# Patient Record
Sex: FEMALE | Race: WHITE | Employment: FULL TIME | URBAN - METROPOLITAN AREA
[De-identification: names, ages, dates, MRNs, and addresses within clinical notes are randomized per-mention and may not be internally consistent; named-entity substitution may affect disease eponyms.]

---

## 2017-01-11 PROCEDURE — 82950 GLUCOSE TEST: CPT | Performed by: OBSTETRICS & GYNECOLOGY

## 2017-01-12 PROCEDURE — 82952 GTT-ADDED SAMPLES: CPT | Performed by: OBSTETRICS & GYNECOLOGY

## 2017-01-12 PROCEDURE — 36415 COLL VENOUS BLD VENIPUNCTURE: CPT | Performed by: OBSTETRICS & GYNECOLOGY

## 2017-01-12 PROCEDURE — 82951 GLUCOSE TOLERANCE TEST (GTT): CPT | Performed by: OBSTETRICS & GYNECOLOGY

## 2017-02-24 ENCOUNTER — HOSPITAL ENCOUNTER (OUTPATIENT)
Facility: HOSPITAL | Age: 37
Setting detail: OBSERVATION
Discharge: HOME | End: 2017-02-25
Attending: OBSTETRICS & GYNECOLOGY | Admitting: OBSTETRICS & GYNECOLOGY
Payer: COMMERCIAL

## 2017-02-24 PROBLEM — Z34.90 PREGNANCY: Status: ACTIVE | Noted: 2017-02-24

## 2017-02-24 PROBLEM — Z34.90 PREGNANCY (HCC): Status: ACTIVE | Noted: 2017-02-24

## 2017-02-24 LAB
BILIRUB UR QL STRIP.AUTO: NEGATIVE
BILIRUBIN URINE: NEGATIVE
BLOOD URINE: NEGATIVE
CLARITY UR REFRACT.AUTO: CLEAR
COLOR UR AUTO: COLORLESS
CONTROL RUN WITHIN 24 HOURS?: YES
GLUCOSE UR STRIP.AUTO-MCNC: NEGATIVE MG/DL
GLUCOSE URINE: NEGATIVE
KETONE URINE: NEGATIVE
KETONES UR STRIP.AUTO-MCNC: NEGATIVE MG/DL
LEUKOCYTE ESTERASE UR QL STRIP.AUTO: NEGATIVE
LEUKOCYTE ESTERASE URINE: NEGATIVE
NITRITE UR QL STRIP.AUTO: NEGATIVE
NITRITE URINE: NEGATIVE
PH UR STRIP.AUTO: 7 [PH] (ref 4.5–8)
PH URINE: 5.5 (ref 5–8)
PROT UR STRIP.AUTO-MCNC: NEGATIVE MG/DL
PROTEIN URINE: NEGATIVE
RBC UR QL AUTO: NEGATIVE
SP GR UR STRIP.AUTO: <1.005 (ref 1–1.03)
SPEC GRAVITY: <=1.005 (ref 1–1.03)
URINE CLARITY: CLEAR
URINE COLOR: YELLOW
UROBILINOGEN UR STRIP.AUTO-MCNC: <2 MG/DL
UROBILINOGEN URINE: 0.2

## 2017-02-24 PROCEDURE — 96361 HYDRATE IV INFUSION ADD-ON: CPT

## 2017-02-24 PROCEDURE — 87086 URINE CULTURE/COLONY COUNT: CPT | Performed by: OBSTETRICS & GYNECOLOGY

## 2017-02-24 PROCEDURE — 81003 URINALYSIS AUTO W/O SCOPE: CPT | Performed by: OBSTETRICS & GYNECOLOGY

## 2017-02-24 PROCEDURE — 76816 OB US FOLLOW-UP PER FETUS: CPT | Performed by: OBSTETRICS & GYNECOLOGY

## 2017-02-24 PROCEDURE — 96360 HYDRATION IV INFUSION INIT: CPT

## 2017-02-24 PROCEDURE — 96372 THER/PROPH/DIAG INJ SC/IM: CPT

## 2017-02-24 PROCEDURE — 81002 URINALYSIS NONAUTO W/O SCOPE: CPT

## 2017-02-24 RX ORDER — SODIUM CHLORIDE, SODIUM LACTATE, POTASSIUM CHLORIDE, CALCIUM CHLORIDE 600; 310; 30; 20 MG/100ML; MG/100ML; MG/100ML; MG/100ML
INJECTION, SOLUTION INTRAVENOUS CONTINUOUS
Status: DISCONTINUED | OUTPATIENT
Start: 2017-02-24 | End: 2017-02-25

## 2017-02-24 RX ORDER — CHOLECALCIFEROL (VITAMIN D3) 25 MCG
1 TABLET,CHEWABLE ORAL DAILY
Status: DISCONTINUED | OUTPATIENT
Start: 2017-02-24 | End: 2017-02-25

## 2017-02-24 RX ORDER — DOCUSATE SODIUM 100 MG/1
100 CAPSULE, LIQUID FILLED ORAL 2 TIMES DAILY
Status: DISCONTINUED | OUTPATIENT
Start: 2017-02-24 | End: 2017-02-25

## 2017-02-24 RX ORDER — SODIUM CHLORIDE, SODIUM LACTATE, POTASSIUM CHLORIDE, CALCIUM CHLORIDE 600; 310; 30; 20 MG/100ML; MG/100ML; MG/100ML; MG/100ML
INJECTION, SOLUTION INTRAVENOUS CONTINUOUS
Status: DISCONTINUED | OUTPATIENT
Start: 2017-02-24 | End: 2017-02-24

## 2017-02-24 RX ORDER — BETAMETHASONE SODIUM PHOSPHATE AND BETAMETHASONE ACETATE 3; 3 MG/ML; MG/ML
12 INJECTION, SUSPENSION INTRA-ARTICULAR; INTRALESIONAL; INTRAMUSCULAR; SOFT TISSUE EVERY 24 HOURS
Status: DISCONTINUED | OUTPATIENT
Start: 2017-02-24 | End: 2017-02-25

## 2017-02-24 RX ORDER — BETAMETHASONE SODIUM PHOSPHATE AND BETAMETHASONE ACETATE 3; 3 MG/ML; MG/ML
INJECTION, SUSPENSION INTRA-ARTICULAR; INTRALESIONAL; INTRAMUSCULAR; SOFT TISSUE
Status: COMPLETED
Start: 2017-02-24 | End: 2017-02-24

## 2017-02-24 RX ORDER — INDOMETHACIN 50 MG/1
50 CAPSULE ORAL ONCE
Status: COMPLETED | OUTPATIENT
Start: 2017-02-24 | End: 2017-02-24

## 2017-02-24 RX ORDER — INDOMETHACIN 50 MG/1
CAPSULE ORAL
Status: COMPLETED
Start: 2017-02-24 | End: 2017-02-24

## 2017-02-24 RX ORDER — METOPROLOL SUCCINATE 50 MG/1
50 TABLET, EXTENDED RELEASE ORAL
Status: DISCONTINUED | OUTPATIENT
Start: 2017-02-24 | End: 2017-02-25

## 2017-02-24 RX ORDER — INDOMETHACIN 25 MG/1
25 CAPSULE ORAL EVERY 6 HOURS
Status: DISCONTINUED | OUTPATIENT
Start: 2017-02-24 | End: 2017-02-25

## 2017-02-24 NOTE — H&P
[address] [place] Patient Status:  Observation    1980 MRN [id number]   [facility] 1NW-A Attending Prasanth Roque MD   Hosp Day # 0 PCP Farheen Frias MD     Date of Admission:   Thyroid Disorder Mother    • Pulmonary Fibrosis[other] [OTHER] Paternal Grandfather    • Lung Disorder Paternal Grandfather    • Rectal Prolapse[other] [OTHER] Sister    • Arthritis Paternal Grandmother      Social History:    Smoking status: Former Smoker become stronger  4) Low lying placenta:  -was 2cm away on last scan  -no further bleeding  5) General diet, IV fluids, bathroom privileges  Risks, benefits, alternatives and possible complications have been discussed in detail with the patient.   Pre-admiss

## 2017-02-24 NOTE — CONSULTS
[address] [place] Patient Status:  Observation    1980 MRN [id number]   [facility] 1NW-A Attending Lachelle Badillo MD   Hosp Day # 0 PCP Pérez Arana MD     SUBJECTIVE:  Reason for Admi Quit date: 04/27/2013    Smokeless tobacco: Never Used    Comment: 4/27/13    Alcohol Use: No        Review of Systems:  Unremarkable.     OBJECTIVE:  Temp:  [97.4 °F (36.3 °C)] 97.4 °F (36.3 °C)  Pulse:  [66] 66  Resp:  [18] 18  BP: (128)/(61) 128/61 mmHg

## 2017-02-24 NOTE — PROGRESS NOTES
Pt is a 31year old female admitted to [facility]. Patient presents with:  R/o  Labor     Pt is  31w0d intra-uterine pregnancy. History obtained, consents signed. Oriented to room, staff, and plan of care.  Patient reports abdominal tighte

## 2017-02-24 NOTE — IMAGING NOTE
Indication: PTL.   ____________________________________________________________________________  History: Age: 31 years.  : 2 Para: 1.  ____________________________________________________________________________  Dating:  LMP: 2016 EDC: / abnormalities. The limitations of ultrasound were discussed. IMPRESSION:    1.  IUP at 31 0/7 wks    2. Scan consistent with dates     3. No ultrasound evidence of structural abnormalities are seen.

## 2017-02-25 ENCOUNTER — HOSPITAL ENCOUNTER (OUTPATIENT)
Facility: HOSPITAL | Age: 37
Setting detail: OBSERVATION
End: 2017-02-25
Attending: OBSTETRICS & GYNECOLOGY | Admitting: OBSTETRICS & GYNECOLOGY
Payer: COMMERCIAL

## 2017-02-25 VITALS
HEIGHT: 68 IN | SYSTOLIC BLOOD PRESSURE: 126 MMHG | BODY MASS INDEX: 33.8 KG/M2 | WEIGHT: 223 LBS | TEMPERATURE: 99 F | HEART RATE: 76 BPM | DIASTOLIC BLOOD PRESSURE: 63 MMHG | RESPIRATION RATE: 18 BRPM

## 2017-02-25 PROCEDURE — 96372 THER/PROPH/DIAG INJ SC/IM: CPT

## 2017-02-25 RX ORDER — NIFEDIPINE 20 MG/1
20 CAPSULE ORAL EVERY 8 HOURS
Status: DISCONTINUED | OUTPATIENT
Start: 2017-02-25 | End: 2017-02-25

## 2017-02-25 RX ORDER — NIFEDIPINE 20 MG/1
20 CAPSULE ORAL EVERY 8 HOURS
Qty: 42 CAPSULE | Refills: 0 | Status: SHIPPED | OUTPATIENT
Start: 2017-02-25 | End: 2017-03-01

## 2017-02-25 NOTE — PROGRESS NOTES
Discharged to home per ambulatory in stable condition with written and verbal instructions. Patient verbalizes understanding of information given.

## 2017-02-25 NOTE — PROGRESS NOTES
She felt mild ctx this morning--like yesterday--but improved now. NO bleeding no LOF, good FM.     O:   02/25/17  0515   BP: 126/63   Pulse: 76   Temp: 98.5 °F (36.9 °C)   Resp: 18   FHT: 140 mod adalberto, +accels, no decels  [place]: q3-5 min this morning, now q5-

## 2017-03-02 PROCEDURE — 82731 ASSAY OF FETAL FIBRONECTIN: CPT | Performed by: OBSTETRICS & GYNECOLOGY

## 2017-03-03 PROCEDURE — 82731 ASSAY OF FETAL FIBRONECTIN: CPT | Performed by: OBSTETRICS & GYNECOLOGY

## 2017-03-03 PROCEDURE — 36415 COLL VENOUS BLD VENIPUNCTURE: CPT | Performed by: OBSTETRICS & GYNECOLOGY

## 2017-03-24 PROCEDURE — 87081 CULTURE SCREEN ONLY: CPT | Performed by: OBSTETRICS & GYNECOLOGY

## 2017-04-03 ENCOUNTER — OFFICE VISIT (OUTPATIENT)
Dept: PERINATAL CARE | Facility: HOSPITAL | Age: 37
End: 2017-04-03
Attending: OBSTETRICS & GYNECOLOGY
Payer: COMMERCIAL

## 2017-04-03 VITALS — SYSTOLIC BLOOD PRESSURE: 112 MMHG | DIASTOLIC BLOOD PRESSURE: 60 MMHG | HEART RATE: 87 BPM

## 2017-04-03 DIAGNOSIS — O40.3XX0 POLYHYDRAMNIOS, THIRD TRIMESTER, NOT APPLICABLE OR UNSPECIFIED FETUS: Primary | ICD-10-CM

## 2017-04-03 PROCEDURE — 76819 FETAL BIOPHYS PROFIL W/O NST: CPT

## 2017-04-03 NOTE — PROGRESS NOTES
Outpatient Maternal-Fetal Medicine Consultation    Dear Dr. Minda Sood    Thank you for requesting ultrasound evaluation and maternal fetal medicine consultation on your patient Trina Martin.   As you are aware she is a 31year old female  with a 120 capsule, Rfl: 0  Allergies: No Known Allergies    PHYSICAL EXAMINATION:  /60 mmHg  Pulse 87  LMP 07/14/2016  General: alert and oriented,no acute distress  Abdomen: gravid, soft, non-tender  Extremities: non-tender, no edema    OBSTETRIC ULTRASOU cm. Q1: 7.7 cm. Q2: 6.3 cm. Q3: 4.1 cm. Q4: 12.0 cm. Non Stress Test: Fetal heart rate: 137 bpm.   Biophysical Profile: Fetal body movements: normal (2), Fetal tone: normal (2), Fetal breathing movements: normal (2), Amniotic fluid volume: normal (2).  Sc 36w3d  · Polyhydramnios - likely idiopathic  · Unstable fetal lie: Cephalic at initiation of ultrasound and back up transverse at conclusion of ultrasound    RECOMMENDATIONS:  · Continue care with Dr. Niko Jurado  · Continue weekly NST's  · Reassess fetal posit

## 2017-04-09 ENCOUNTER — TELEPHONE (OUTPATIENT)
Dept: OBGYN UNIT | Facility: HOSPITAL | Age: 37
End: 2017-04-09

## 2017-04-10 ENCOUNTER — TELEPHONE (OUTPATIENT)
Dept: OBGYN UNIT | Facility: HOSPITAL | Age: 37
End: 2017-04-10

## 2017-04-12 ENCOUNTER — ANESTHESIA (OUTPATIENT)
Dept: OBGYN UNIT | Facility: HOSPITAL | Age: 37
End: 2017-04-12
Payer: COMMERCIAL

## 2017-04-12 ENCOUNTER — APPOINTMENT (OUTPATIENT)
Dept: OBGYN CLINIC | Facility: HOSPITAL | Age: 37
End: 2017-04-12
Attending: OBSTETRICS & GYNECOLOGY
Payer: COMMERCIAL

## 2017-04-12 ENCOUNTER — APPOINTMENT (OUTPATIENT)
Dept: GENERAL RADIOLOGY | Facility: HOSPITAL | Age: 37
End: 2017-04-12
Attending: OBSTETRICS & GYNECOLOGY
Payer: COMMERCIAL

## 2017-04-12 ENCOUNTER — SURGERY (OUTPATIENT)
Age: 37
End: 2017-04-12

## 2017-04-12 ENCOUNTER — HOSPITAL ENCOUNTER (INPATIENT)
Facility: HOSPITAL | Age: 37
LOS: 3 days | Discharge: HOME | End: 2017-04-15
Attending: OBSTETRICS & GYNECOLOGY | Admitting: OBSTETRICS & GYNECOLOGY
Payer: COMMERCIAL

## 2017-04-12 ENCOUNTER — ANESTHESIA EVENT (OUTPATIENT)
Dept: OBGYN UNIT | Facility: HOSPITAL | Age: 37
End: 2017-04-12
Payer: COMMERCIAL

## 2017-04-12 PROBLEM — Z34.90 PREGNANT: Status: ACTIVE | Noted: 2017-04-12

## 2017-04-12 PROBLEM — Z34.90 PREGNANT (HCC): Status: ACTIVE | Noted: 2017-04-12

## 2017-04-12 PROCEDURE — 74000 XR ABDOMEN (1 VIEW) (CPT=74000): CPT

## 2017-04-12 PROCEDURE — 59514 CESAREAN DELIVERY ONLY: CPT | Performed by: OBSTETRICS & GYNECOLOGY

## 2017-04-12 RX ORDER — TERBUTALINE SULFATE 1 MG/ML
0.25 INJECTION, SOLUTION SUBCUTANEOUS AS NEEDED
Status: DISCONTINUED | OUTPATIENT
Start: 2017-04-12 | End: 2017-04-12 | Stop reason: HOSPADM

## 2017-04-12 RX ORDER — DIPHENHYDRAMINE HYDROCHLORIDE 50 MG/ML
12.5 INJECTION INTRAMUSCULAR; INTRAVENOUS EVERY 4 HOURS PRN
Status: DISCONTINUED | OUTPATIENT
Start: 2017-04-12 | End: 2017-04-15

## 2017-04-12 RX ORDER — KETOROLAC TROMETHAMINE 30 MG/ML
30 INJECTION, SOLUTION INTRAMUSCULAR; INTRAVENOUS ONCE AS NEEDED
Status: DISCONTINUED | OUTPATIENT
Start: 2017-04-12 | End: 2017-04-12 | Stop reason: HOSPADM

## 2017-04-12 RX ORDER — KETOROLAC TROMETHAMINE 30 MG/ML
30 INJECTION, SOLUTION INTRAMUSCULAR; INTRAVENOUS EVERY 6 HOURS PRN
Status: DISPENSED | OUTPATIENT
Start: 2017-04-12 | End: 2017-04-14

## 2017-04-12 RX ORDER — DOCUSATE SODIUM 100 MG/1
100 CAPSULE, LIQUID FILLED ORAL
Status: DISCONTINUED | OUTPATIENT
Start: 2017-04-13 | End: 2017-04-15

## 2017-04-12 RX ORDER — ONDANSETRON 2 MG/ML
4 INJECTION INTRAMUSCULAR; INTRAVENOUS EVERY 6 HOURS PRN
Status: DISCONTINUED | OUTPATIENT
Start: 2017-04-12 | End: 2017-04-15

## 2017-04-12 RX ORDER — IBUPROFEN 600 MG/1
600 TABLET ORAL ONCE AS NEEDED
Status: DISCONTINUED | OUTPATIENT
Start: 2017-04-12 | End: 2017-04-12 | Stop reason: HOSPADM

## 2017-04-12 RX ORDER — ZOLPIDEM TARTRATE 5 MG/1
5 TABLET ORAL NIGHTLY PRN
Status: DISCONTINUED | OUTPATIENT
Start: 2017-04-12 | End: 2017-04-15

## 2017-04-12 RX ORDER — METOCLOPRAMIDE HYDROCHLORIDE 5 MG/ML
10 INJECTION INTRAMUSCULAR; INTRAVENOUS EVERY 4 HOURS PRN
Status: DISCONTINUED | OUTPATIENT
Start: 2017-04-12 | End: 2017-04-15

## 2017-04-12 RX ORDER — IBUPROFEN 600 MG/1
600 TABLET ORAL EVERY 6 HOURS SCHEDULED
Status: DISCONTINUED | OUTPATIENT
Start: 2017-04-13 | End: 2017-04-15

## 2017-04-12 RX ORDER — DEXTROSE, SODIUM CHLORIDE, SODIUM LACTATE, POTASSIUM CHLORIDE, AND CALCIUM CHLORIDE 5; .6; .31; .03; .02 G/100ML; G/100ML; G/100ML; G/100ML; G/100ML
INJECTION, SOLUTION INTRAVENOUS CONTINUOUS
Status: DISCONTINUED | OUTPATIENT
Start: 2017-04-12 | End: 2017-04-15

## 2017-04-12 RX ORDER — DIPHENHYDRAMINE HCL 25 MG
25 CAPSULE ORAL EVERY 6 HOURS PRN
Status: DISCONTINUED | OUTPATIENT
Start: 2017-04-12 | End: 2017-04-15

## 2017-04-12 RX ORDER — HYDROMORPHONE HYDROCHLORIDE 1 MG/ML
0.4 INJECTION, SOLUTION INTRAMUSCULAR; INTRAVENOUS; SUBCUTANEOUS EVERY 5 MIN PRN
Status: DISCONTINUED | OUTPATIENT
Start: 2017-04-12 | End: 2017-04-12 | Stop reason: HOSPADM

## 2017-04-12 RX ORDER — ACETAMINOPHEN 10 MG/ML
1000 INJECTION, SOLUTION INTRAVENOUS EVERY 6 HOURS PRN
Status: DISCONTINUED | OUTPATIENT
Start: 2017-04-12 | End: 2017-04-13

## 2017-04-12 RX ORDER — HYDROMORPHONE HYDROCHLORIDE 1 MG/ML
INJECTION, SOLUTION INTRAMUSCULAR; INTRAVENOUS; SUBCUTANEOUS
Status: DISPENSED
Start: 2017-04-12 | End: 2017-04-12

## 2017-04-12 RX ORDER — HYDROMORPHONE HYDROCHLORIDE 1 MG/ML
0.5 INJECTION, SOLUTION INTRAMUSCULAR; INTRAVENOUS; SUBCUTANEOUS ONCE
Status: COMPLETED | OUTPATIENT
Start: 2017-04-12 | End: 2017-04-12

## 2017-04-12 RX ORDER — DIPHENHYDRAMINE HYDROCHLORIDE 50 MG/ML
25 INJECTION INTRAMUSCULAR; INTRAVENOUS ONCE AS NEEDED
Status: DISCONTINUED | OUTPATIENT
Start: 2017-04-12 | End: 2017-04-12 | Stop reason: HOSPADM

## 2017-04-12 RX ORDER — HYDROCODONE BITARTRATE AND ACETAMINOPHEN 5; 325 MG/1; MG/1
1 TABLET ORAL EVERY 4 HOURS PRN
Status: DISCONTINUED | OUTPATIENT
Start: 2017-04-13 | End: 2017-04-13

## 2017-04-12 RX ORDER — HYDROMORPHONE HYDROCHLORIDE 1 MG/ML
INJECTION, SOLUTION INTRAMUSCULAR; INTRAVENOUS; SUBCUTANEOUS
Status: COMPLETED
Start: 2017-04-12 | End: 2017-04-12

## 2017-04-12 RX ORDER — BISACODYL 10 MG
10 SUPPOSITORY, RECTAL RECTAL
Status: DISCONTINUED | OUTPATIENT
Start: 2017-04-12 | End: 2017-04-15

## 2017-04-12 RX ORDER — SIMETHICONE 80 MG
80 TABLET,CHEWABLE ORAL 3 TIMES DAILY PRN
Status: DISCONTINUED | OUTPATIENT
Start: 2017-04-12 | End: 2017-04-15

## 2017-04-12 RX ORDER — NALBUPHINE HCL 10 MG/ML
10 AMPUL (ML) INJECTION
Status: DISCONTINUED | OUTPATIENT
Start: 2017-04-12 | End: 2017-04-15

## 2017-04-12 RX ORDER — DEXTROSE, SODIUM CHLORIDE, SODIUM LACTATE, POTASSIUM CHLORIDE, AND CALCIUM CHLORIDE 5; .6; .31; .03; .02 G/100ML; G/100ML; G/100ML; G/100ML; G/100ML
INJECTION, SOLUTION INTRAVENOUS AS NEEDED
Status: DISCONTINUED | OUTPATIENT
Start: 2017-04-12 | End: 2017-04-12 | Stop reason: HOSPADM

## 2017-04-12 RX ORDER — METOPROLOL SUCCINATE 50 MG/1
50 TABLET, EXTENDED RELEASE ORAL
Status: DISCONTINUED | OUTPATIENT
Start: 2017-04-13 | End: 2017-04-15

## 2017-04-12 RX ORDER — SODIUM CHLORIDE, SODIUM LACTATE, POTASSIUM CHLORIDE, CALCIUM CHLORIDE 600; 310; 30; 20 MG/100ML; MG/100ML; MG/100ML; MG/100ML
INJECTION, SOLUTION INTRAVENOUS CONTINUOUS
Status: DISCONTINUED | OUTPATIENT
Start: 2017-04-12 | End: 2017-04-12

## 2017-04-12 RX ORDER — NALBUPHINE HCL 10 MG/ML
2.5 AMPUL (ML) INJECTION
Status: DISCONTINUED | OUTPATIENT
Start: 2017-04-12 | End: 2017-04-12 | Stop reason: HOSPADM

## 2017-04-12 RX ORDER — SODIUM CHLORIDE, SODIUM LACTATE, POTASSIUM CHLORIDE, CALCIUM CHLORIDE 600; 310; 30; 20 MG/100ML; MG/100ML; MG/100ML; MG/100ML
INJECTION, SOLUTION INTRAVENOUS CONTINUOUS
Status: DISCONTINUED | OUTPATIENT
Start: 2017-04-12 | End: 2017-04-12 | Stop reason: HOSPADM

## 2017-04-12 RX ORDER — METHYLERGONOVINE MALEATE 0.2 MG/ML
INJECTION INTRAVENOUS
Status: DISPENSED
Start: 2017-04-12 | End: 2017-04-12

## 2017-04-12 RX ORDER — KETOROLAC TROMETHAMINE 30 MG/ML
INJECTION, SOLUTION INTRAMUSCULAR; INTRAVENOUS
Status: DISPENSED
Start: 2017-04-12 | End: 2017-04-12

## 2017-04-12 RX ORDER — ONDANSETRON 2 MG/ML
4 INJECTION INTRAMUSCULAR; INTRAVENOUS ONCE AS NEEDED
Status: DISCONTINUED | OUTPATIENT
Start: 2017-04-12 | End: 2017-04-12 | Stop reason: HOSPADM

## 2017-04-12 RX ORDER — HYDROCODONE BITARTRATE AND ACETAMINOPHEN 10; 325 MG/1; MG/1
1 TABLET ORAL EVERY 4 HOURS PRN
Status: DISCONTINUED | OUTPATIENT
Start: 2017-04-13 | End: 2017-04-13

## 2017-04-12 NOTE — H&P
Pt is 30 y/o  who presents at 37 weeks 5 days for induction for unstable lie and polyhydramnios.     PMH - 1st baby - 15 hour labor - pushed 3 hours - vacuum - straight OP               No surgeries               No medical issues               No H/

## 2017-04-12 NOTE — ANESTHESIA PREPROCEDURE EVALUATION
PRE-OP EVALUATION    Patient Name: Alycia Martinez [place]    Pre-op Diagnosis: * No pre-op diagnosis entered *    Procedure(s):      Surgeon(s) and Role:     Jennifer Guo MD - Primary     * Familia Weston MD - Assisting Surgeon    Pre-op vitals reviewed. Quit date: 04/27/2013    Smokeless tobacco: Never Used    Comment: 4/27/13    Alcohol Use: No       Drug Use: No     Available pre-op labs reviewed.     Lab Results  Component Value Date   WBC 9.5 04/12/2017   RBC 3.53* 04/12/2017   HGB 11.7* 04/12/2017   H

## 2017-04-12 NOTE — BRIEF OP NOTE
[facility] 1NW-A  Brief Op Note     Krista Pin [place] Location: OR   [facility] [id number] MRN [id number]   Admission Date 4/12/2017 Operation Date 4/12/2017   Attending Physician Sandra Bravo MD Operating Physician Cristine Castillo MD       Pre-Operative Everton Che[place]

## 2017-04-12 NOTE — PLAN OF CARE
PAIN - ADULT    • Verbalizes/displays adequate comfort level or patient's stated pain goal Progressing        POSTPARTUM    • Long Term Goal:Experiences normal postpartum course Progressing    • Optimize infant feeding at the breast Progressing    • Approp

## 2017-04-12 NOTE — PROGRESS NOTES
Admitted to mother/baby per cart. Oriented to room. Safety precautions initiated. Bed in low position. Call light within reach. IV infusing on pump, Oropeza to gravity, SCD's being applied.

## 2017-04-12 NOTE — ANESTHESIA POSTPROCEDURE EVALUATION
[address] [place] Patient Status:  Inpatient   Age/Gender 31year old female MRN [id number]   [facility] 1NW-A Attending Aramis Clark MD   Hosp Day # 0 PCP Flavia Malagon MD       Anesthesia Post-op Note    Procedure(

## 2017-04-12 NOTE — PLAN OF CARE
Problem: Patient/Family Goals  Goal: Patient/Family Long Term Goal  Patient’s Long Term Goal:  Patient will have a uncomplicated vagina delivery  Interventions:    - See additional Care Plan goals for specific interventions   Outcome: Completed Date Met:

## 2017-04-12 NOTE — OPERATIVE REPORT
[facility]    PATIENT'S NAME: Андрей Pereaaustin   ATTENDING PHYSICIAN: Mariama Damon M.D. OPERATING PHYSICIAN: Mariama Damon M.D.    PATIENT ACCOUNT#:   [de-identified]    LOCATION:  1NW-A [facility]  MEDICAL RECORD #:   [id number]       DATE OF BIR down from the uterus. The head was delivered. The nares and mouth were suctioned. There was no obvious cord presenting. The rest of the infant delivered easily. Vigorous live-born female. Delayed cord clamping for 30 seconds.   The cord was clamped, t the procedure well and was transferred to the recovery room in stable condition.     Dictated By Bruno Jorge M.D.  d: 04/12/2017 10:23:08  t: 04/12/2017 10:58:56  [facility] [id number]  [facility]/

## 2017-04-12 NOTE — PROGRESS NOTES
Pt given iv dilaudid and toradol  postop. Unable to control with pca dilaudid and iv boluses. Discussed with pt. duramorph spinal. Pt agreeable. .    1100 Pt turned LLD position with knees bent. Sterile tech. 24 g spinal needle. Local to skin.  Easy une

## 2017-04-13 RX ORDER — HYDROCODONE BITARTRATE AND ACETAMINOPHEN 10; 325 MG/1; MG/1
1 TABLET ORAL EVERY 4 HOURS PRN
Status: DISCONTINUED | OUTPATIENT
Start: 2017-04-13 | End: 2017-04-15

## 2017-04-13 RX ORDER — HYDROCODONE BITARTRATE AND ACETAMINOPHEN 5; 325 MG/1; MG/1
1 TABLET ORAL EVERY 4 HOURS PRN
Status: DISCONTINUED | OUTPATIENT
Start: 2017-04-13 | End: 2017-04-15

## 2017-04-13 NOTE — PLAN OF CARE
GASTROINTESTINAL - ADULT    • Minimal or absence of nausea and vomiting Completed          GASTROINTESTINAL - ADULT    • Maintains or returns to baseline bowel function Progressing        GENITOURINARY - ADULT    • Absence of urinary retention Progressing

## 2017-04-13 NOTE — PROGRESS NOTES
Patient transferred to mother/baby room 2208 per cart in stable condition with baby and personal belongings. Accompanied by  and staff. Report given to mother/baby RN.

## 2017-04-13 NOTE — PROGRESS NOTES
POD #1  Pt without complaints - nausea gone since midnight   /63 mmHg  Pulse 64  Temp(Src) 98.2 °F (36.8 °C) (Oral)  Resp 18  Ht 5' 8\" (1.727 m)  Wt 231 lb (104.781 kg)  BMI 35.13 kg/m2  SpO2 98%  LMP 07/14/2016  Breastfeeding? Yes   U. O - 2500  Sandro

## 2017-04-14 NOTE — PROGRESS NOTES
[facility]  Post-Partum Caesarean Section Progress Note    Krista Pin [place] Patient Status:  Inpatient    1980 MRN [id number]   [facility] 2SW-J Attending Sandra Bravo MD   Hosp Day # 2 PCP Gail Titus MD     SUBJECTIVE Emelia  4/14/2017  8:56 AM

## 2017-04-14 NOTE — PLAN OF CARE
GASTROINTESTINAL - ADULT    • Maintains or returns to baseline bowel function Progressing    • Maintains adequate nutritional intake (undernourished) Progressing    • Achieves appropriate nutritional intake (bariatric) Progressing        GENITOURINARY - AD

## 2017-04-15 VITALS
OXYGEN SATURATION: 97 % | TEMPERATURE: 99 F | HEART RATE: 59 BPM | HEIGHT: 68 IN | RESPIRATION RATE: 20 BRPM | DIASTOLIC BLOOD PRESSURE: 68 MMHG | WEIGHT: 231 LBS | SYSTOLIC BLOOD PRESSURE: 117 MMHG | BODY MASS INDEX: 35.01 KG/M2

## 2017-04-15 RX ORDER — HYDROCODONE BITARTRATE AND ACETAMINOPHEN 5; 325 MG/1; MG/1
1 TABLET ORAL EVERY 4 HOURS PRN
Qty: 30 TABLET | Refills: 0 | Status: SHIPPED | OUTPATIENT
Start: 2017-04-15 | End: 2017-05-24

## 2017-04-15 NOTE — DISCHARGE SUMMARY
[facility]  OB Discharge Summary    Tanner Francois [place] Patient Status:  Inpatient    1980 MRN [id number]   [facility] 2SW-J Attending Maricruz Shelton MD   Hosp Day # 3 PCP Errol Allison MD       Primary OB Clinician: Brandt Mason

## 2017-04-15 NOTE — PROGRESS NOTES
[facility]  Post-Partum Caesarean Section Progress Note    Carlotta Monson [place] Patient Status:  Inpatient    1980 MRN [id number]   [facility] 2SW-J Attending Ellen Mobley MD   Hosp Day # 3 PCP Sanjay Gresham MD     SUBJECTIVE

## 2017-04-15 NOTE — PROGRESS NOTES
NURSING DISCHARGE NOTE    Discharged Home via Wheelchair. Accompanied by Support staff  Belongings Taken by patient/family. Discharge instructions reviewed. Pt states understanding.

## 2017-04-17 ENCOUNTER — TELEPHONE (OUTPATIENT)
Dept: OBGYN UNIT | Facility: HOSPITAL | Age: 37
End: 2017-04-17

## 2017-04-22 ENCOUNTER — TELEPHONE (OUTPATIENT)
Dept: OBGYN UNIT | Facility: HOSPITAL | Age: 37
End: 2017-04-22

## 2017-11-07 ENCOUNTER — OFFICE VISIT (OUTPATIENT)
Dept: INTERNAL MEDICINE CLINIC | Facility: CLINIC | Age: 37
End: 2017-11-07

## 2017-11-07 VITALS
HEART RATE: 72 BPM | TEMPERATURE: 98 F | DIASTOLIC BLOOD PRESSURE: 70 MMHG | RESPIRATION RATE: 16 BRPM | SYSTOLIC BLOOD PRESSURE: 128 MMHG | BODY MASS INDEX: 30 KG/M2 | WEIGHT: 199 LBS

## 2017-11-07 DIAGNOSIS — F41.9 ANXIETY: ICD-10-CM

## 2017-11-07 DIAGNOSIS — M54.6 ACUTE LEFT-SIDED THORACIC BACK PAIN: Primary | ICD-10-CM

## 2017-11-07 PROCEDURE — 99203 OFFICE O/P NEW LOW 30 MIN: CPT | Performed by: INTERNAL MEDICINE

## 2017-11-07 RX ORDER — SERTRALINE HYDROCHLORIDE 25 MG/1
25 TABLET, FILM COATED ORAL DAILY
Qty: 90 TABLET | Refills: 1 | Status: SHIPPED | OUTPATIENT
Start: 2017-11-07 | End: 2018-04-28

## 2017-11-07 NOTE — PROGRESS NOTES
Brianda Martin is a 30year old female. Patient presents with:  Back Pain: ROOM 3 used to see SD and now establishing with TB--Back pain started about 3 months ago has not seen anyone nor any imagine.  Middle left side of back is where the pain is Date   • Arrhythmia 2012    chronic pvc   • Depression    • PVCs (premature ventricular contractions)     Chronic;  Pt currently taking Toprol XL daily   • Rotator cuff tear 2002    Left shoulder      Social History:  Smoking status: Former Smoker without respiratory distress, lungs clear to auscultation  CARDIO: RRR nl S1 S2  GI: normal bowel sounds, no masses, HSM or tenderness  Spine: paraspinal muscle tenderness, left side thoracic spine region  EXTREMITIES: no cyanosis, clubbing or edema  NEURO

## 2018-04-30 RX ORDER — SERTRALINE HYDROCHLORIDE 25 MG/1
TABLET, FILM COATED ORAL
Qty: 90 TABLET | Refills: 0 | Status: SHIPPED | OUTPATIENT
Start: 2018-04-30 | End: 2018-07-02

## 2018-04-30 NOTE — TELEPHONE ENCOUNTER
LOV: 11/7/17 w/ TB  FOV: None  Last labs: 4/17/17 CBC  Last Refill: 11/7/17 qt:90 1 refill    Per protocol sent to provider

## 2018-07-30 RX ORDER — SERTRALINE HYDROCHLORIDE 25 MG/1
TABLET, FILM COATED ORAL
Qty: 90 TABLET | Refills: 0 | OUTPATIENT
Start: 2018-07-30

## 2018-08-22 NOTE — TELEPHONE ENCOUNTER
LFV:11/07/2017 with TB for acute issue including anxiety  Future Appt: none on file, was due back in Nov to discuss start of sertraline  Last Rx:7/2/18 for 90 days w one refill by Piotr Alcala  Last Labs: 4/14/2017 cbc  Per protocol need to call pt to clarify

## 2018-08-27 RX ORDER — SERTRALINE HYDROCHLORIDE 25 MG/1
TABLET, FILM COATED ORAL
Qty: 90 TABLET | Refills: 0 | OUTPATIENT
Start: 2018-08-27

## 2018-08-27 NOTE — TELEPHONE ENCOUNTER
LVM to give us a call back    We need to know for her medication Sertraline HCL 50 MG how much she is taking and who is managing her is or Meng Bonilla MD    Patient is also due for physical

## 2018-08-27 NOTE — TELEPHONE ENCOUNTER
Her ob started her on it on 50mg and TB changed to 75mg so we were giving her 25 to make up the 75-she want just to have TB give a rx for 75 total and that way she doesn't have to have 2 doctors involved in this rx-please send in new rx for 75mg.

## 2018-08-27 NOTE — TELEPHONE ENCOUNTER
Can send 90 in for her (50 and 25mg as it does not come in 75mg tabs)  She needs to f/u within 90 days then

## 2018-09-07 ENCOUNTER — OFFICE VISIT (OUTPATIENT)
Dept: INTERNAL MEDICINE CLINIC | Facility: CLINIC | Age: 38
End: 2018-09-07
Payer: COMMERCIAL

## 2018-09-07 VITALS
BODY MASS INDEX: 30.16 KG/M2 | SYSTOLIC BLOOD PRESSURE: 110 MMHG | TEMPERATURE: 98 F | DIASTOLIC BLOOD PRESSURE: 60 MMHG | HEIGHT: 68 IN | WEIGHT: 199 LBS | HEART RATE: 68 BPM

## 2018-09-07 DIAGNOSIS — L72.3 INFECTED SEBACEOUS CYST: Primary | ICD-10-CM

## 2018-09-07 DIAGNOSIS — F41.9 ANXIETY: ICD-10-CM

## 2018-09-07 DIAGNOSIS — L08.9 INFECTED SEBACEOUS CYST: Primary | ICD-10-CM

## 2018-09-07 PROCEDURE — 99214 OFFICE O/P EST MOD 30 MIN: CPT | Performed by: NURSE PRACTITIONER

## 2018-09-07 RX ORDER — SULFAMETHOXAZOLE AND TRIMETHOPRIM 800; 160 MG/1; MG/1
1 TABLET ORAL 2 TIMES DAILY
Qty: 20 TABLET | Refills: 0 | Status: SHIPPED | OUTPATIENT
Start: 2018-09-07 | End: 2019-05-01

## 2018-09-07 NOTE — PROGRESS NOTES
Ling Archer [place] is a 30year old female. Patient presents with: Other: LG. Room 10.  Thinks she has a lipoma on her right hip/butt and its painful   Medication Request      HPI:   Presents for eval of right hip inflammed cyst.   States she thought it Packs/day: 0.00      Years: 10.00        Quit date: 4/27/2013  Smokeless tobacco: Never Used                      Comment: 4/27/13  Alcohol use: Yes           0.6 oz/week     Standard drinks or equivalent: 1 per week     Comment: Cage done 9-7-18    Family sebaceous cyst  (primary encounter diagnosis)  Warm compresses. Bactrim ds bid x 10 days. Anxiety  Sertraline 75mg. No orders of the defined types were placed in this encounter.       Meds & Refills for this Visit:  Signed Prescriptions Disp Refills

## 2019-04-05 NOTE — TELEPHONE ENCOUNTER
LOV: 9/7/18 w/ SD  FOV: None  Last labs: 4/14/17 CBC  Last Refill: 9/17/18 qt:145 1 refill    Per protocol routed to provider

## 2019-05-01 ENCOUNTER — OFFICE VISIT (OUTPATIENT)
Dept: INTERNAL MEDICINE CLINIC | Facility: CLINIC | Age: 39
End: 2019-05-01
Payer: COMMERCIAL

## 2019-05-01 VITALS
WEIGHT: 207 LBS | RESPIRATION RATE: 16 BRPM | SYSTOLIC BLOOD PRESSURE: 118 MMHG | HEIGHT: 68 IN | DIASTOLIC BLOOD PRESSURE: 78 MMHG | BODY MASS INDEX: 31.37 KG/M2 | HEART RATE: 78 BPM

## 2019-05-01 DIAGNOSIS — M25.552 BILATERAL HIP PAIN: ICD-10-CM

## 2019-05-01 DIAGNOSIS — E66.9 OBESITY (BMI 30-39.9): ICD-10-CM

## 2019-05-01 DIAGNOSIS — Z51.81 ENCOUNTER FOR THERAPEUTIC DRUG MONITORING: Primary | ICD-10-CM

## 2019-05-01 DIAGNOSIS — M25.551 BILATERAL HIP PAIN: ICD-10-CM

## 2019-05-01 DIAGNOSIS — I49.3 PVC (PREMATURE VENTRICULAR CONTRACTION): ICD-10-CM

## 2019-05-01 PROCEDURE — 99214 OFFICE O/P EST MOD 30 MIN: CPT | Performed by: INTERNAL MEDICINE

## 2019-05-01 PROCEDURE — 93000 ELECTROCARDIOGRAM COMPLETE: CPT | Performed by: INTERNAL MEDICINE

## 2019-05-01 RX ORDER — LIRAGLUTIDE 6 MG/ML
3 INJECTION, SOLUTION SUBCUTANEOUS DAILY
Qty: 5 PEN | Refills: 1 | Status: SHIPPED | OUTPATIENT
Start: 2019-05-01 | End: 2019-06-12

## 2019-05-01 NOTE — PATIENT INSTRUCTIONS
Felicity swain     Plan:  Continue with medications:   Go to the lab for blood work   Follow up with me in 1 month  Schedule follow up appointments: Soham Shearer (dietitian)  Check for insurance coverage for dietitian and labwork prior to scheduling appoin vegetables          FRUIT  Low carb fruit options   Raspberries: Half a cup (60 grams) contains 3 grams of carbs. Blackberries: Half a cup (70 grams) contains 4 grams of carbs. Strawberries: Half a cup (100 grams) contains 6 grams of carbs.   Blueberries:

## 2019-05-01 NOTE — PROGRESS NOTES
HISTORY OF PRESENT ILLNESS  Patient presents with:  Weight Problem: Works Cover, on [employer] wat[employer]      Xena Martin is a 41year old female new to our office today for initiation of medical weight loss program.  Patient presents today with c/ Family or personal history of Pancreatic issues / Medullary Thyroid Cancer: negative    History of bariatric surgery: negative     [address] reviewed: obesity in parent/s or sibling: mom     REVIEW OF SYSTEMS  GENERAL: feels well otherwise,   NECK: denies thicke  06/12/2012    K 4.5 06/12/2012     06/12/2012    CO2 23 06/12/2012     No results found for: EAG, A1C  Lab Results   Component Value Date    CHOLEST 190 03/16/2011    TRIG 123 03/16/2011    HDL 67 03/16/2011    LDL 98 03/16/2011    CHOLHDLRAT -     Insulin Pen Needle (BD PEN NEEDLE DUC U/F) 32G X 4 MM Does not apply Misc; Inject 1 pen into the skin daily.         PLAN  · Initial consult: 207 lb   · Check baseline labs for vit D and b12  · Start saxenda  · Injection teaching done in OV   · Due t 5. Reduce carbohydrates which includes sweets as well as rice, pasta, potatoes, bread, corn and instead choose whole grain options or more protein or vegetables. 6. Get a good night of sleep  7. Try to decrease stress in life    Please download apps:  1.

## 2019-05-01 NOTE — PROGRESS NOTES
Patient teaching on [address] [place] [address] performed. Pt injected 0.6 mg in the office today with a sample pen. Patient demonstrated back, all questions were answered and patient verbalized understanding.  TARYN

## 2019-05-06 ENCOUNTER — TELEPHONE (OUTPATIENT)
Dept: INTERNAL MEDICINE CLINIC | Facility: CLINIC | Age: 39
End: 2019-05-06

## 2019-05-06 NOTE — TELEPHONE ENCOUNTER
Prior auth needed for [employer] [id number]    I tried entering PA and LORNAM said that RX was paid for.  I did see that she picked up at pharmacy 5/1/19 so I am to disregard this request.

## 2019-05-09 ENCOUNTER — PATIENT MESSAGE (OUTPATIENT)
Dept: INTERNAL MEDICINE CLINIC | Facility: CLINIC | Age: 39
End: 2019-05-09

## 2019-05-09 DIAGNOSIS — Z13.220 SCREENING FOR LIPOID DISORDERS: ICD-10-CM

## 2019-05-09 DIAGNOSIS — Z13.228 SCREENING FOR METABOLIC DISORDER: ICD-10-CM

## 2019-05-09 DIAGNOSIS — Z00.00 ROUTINE GENERAL MEDICAL EXAMINATION AT A HEALTH CARE FACILITY: Primary | ICD-10-CM

## 2019-05-09 DIAGNOSIS — Z13.0 SCREENING FOR DISORDER OF BLOOD AND BLOOD-FORMING ORGANS: ICD-10-CM

## 2019-05-09 DIAGNOSIS — Z13.29 SCREENING FOR THYROID DISORDER: ICD-10-CM

## 2019-05-09 NOTE — TELEPHONE ENCOUNTER
I will need to see her and decide about med change at that time, both are beta blockers so I am not sure changing will make that much difference.

## 2019-05-28 ENCOUNTER — TELEPHONE (OUTPATIENT)
Dept: INTERNAL MEDICINE CLINIC | Facility: CLINIC | Age: 39
End: 2019-05-28

## 2019-05-28 DIAGNOSIS — Z83.79 FAMILY HISTORY OF CELIAC DISEASE: Primary | ICD-10-CM

## 2019-05-28 NOTE — TELEPHONE ENCOUNTER
TB-are there any labs to order for celiac and would you be ok ordering? Please advise. I will change labs to [facility].

## 2019-05-31 ENCOUNTER — LAB ENCOUNTER (OUTPATIENT)
Dept: LAB | Age: 39
End: 2019-05-31
Attending: INTERNAL MEDICINE
Payer: COMMERCIAL

## 2019-05-31 DIAGNOSIS — Z83.79 FAMILY HISTORY OF CELIAC DISEASE: ICD-10-CM

## 2019-05-31 PROCEDURE — 83516 IMMUNOASSAY NONANTIBODY: CPT

## 2019-05-31 PROCEDURE — 84443 ASSAY THYROID STIM HORMONE: CPT | Performed by: INTERNAL MEDICINE

## 2019-05-31 PROCEDURE — 36415 COLL VENOUS BLD VENIPUNCTURE: CPT | Performed by: INTERNAL MEDICINE

## 2019-05-31 PROCEDURE — 80053 COMPREHEN METABOLIC PANEL: CPT | Performed by: INTERNAL MEDICINE

## 2019-05-31 PROCEDURE — 86256 FLUORESCENT ANTIBODY TITER: CPT

## 2019-05-31 PROCEDURE — 82306 VITAMIN D 25 HYDROXY: CPT | Performed by: INTERNAL MEDICINE

## 2019-05-31 PROCEDURE — 82607 VITAMIN B-12: CPT | Performed by: INTERNAL MEDICINE

## 2019-05-31 PROCEDURE — 82784 ASSAY IGA/IGD/IGG/IGM EACH: CPT

## 2019-05-31 PROCEDURE — 80061 LIPID PANEL: CPT | Performed by: INTERNAL MEDICINE

## 2019-05-31 PROCEDURE — 85025 COMPLETE CBC W/AUTO DIFF WBC: CPT | Performed by: INTERNAL MEDICINE

## 2019-06-03 ENCOUNTER — OFFICE VISIT (OUTPATIENT)
Dept: INTERNAL MEDICINE CLINIC | Facility: CLINIC | Age: 39
End: 2019-06-03
Payer: COMMERCIAL

## 2019-06-03 VITALS
WEIGHT: 194.63 LBS | HEART RATE: 76 BPM | HEIGHT: 68 IN | DIASTOLIC BLOOD PRESSURE: 76 MMHG | SYSTOLIC BLOOD PRESSURE: 110 MMHG | BODY MASS INDEX: 29.5 KG/M2 | RESPIRATION RATE: 16 BRPM

## 2019-06-03 DIAGNOSIS — F41.9 ANXIETY: ICD-10-CM

## 2019-06-03 DIAGNOSIS — Z00.00 ROUTINE GENERAL MEDICAL EXAMINATION AT A HEALTH CARE FACILITY: Primary | ICD-10-CM

## 2019-06-03 PROCEDURE — 99395 PREV VISIT EST AGE 18-39: CPT | Performed by: INTERNAL MEDICINE

## 2019-06-03 RX ORDER — SERTRALINE HYDROCHLORIDE 100 MG/1
100 TABLET, FILM COATED ORAL DAILY
Qty: 90 TABLET | Refills: 1 | Status: SHIPPED | OUTPATIENT
Start: 2019-06-03 | End: 2019-08-26

## 2019-06-03 NOTE — PROGRESS NOTES
Patient presents with:  Physical: cn room 4: physical and discuss anxiety       HPI:    Patient is here for cpe, has own gyne and will see for IUD check this year. Believes utd on pap. CPE labs WNL.  She is still working in [employer], works full time and has Chronic;  Pt currently taking Toprol XL daily   • Rotator cuff tear     Left shoulder     Past Surgical History:   Procedure Laterality Date   •  SECTION N/A 2017    Performed by Colton Sarah MD at [facility] L[facility]D OR   • ORAL SURGERY PROCEDURE  20 Metoprolol Succinate ER 50 MG Oral Tablet 24 Hr TAKE 1 TABLET(50 MG) BY MOUTH EVERY DAY Disp: 90 tablet Rfl: 3   ELETRIPTAN HYDROBROMIDE 40 MG Oral Tab TAKE 1 TABLET BY MOUTH AT ONSET AND MAY REPEAT IF NEEDED AFTER 2 HOURS FOR A MAX OF 2 PILLS IN 24 HOUR any problems  Migraines - continue eletriptan prn        Meds & Refills for this Visit:  Requested Prescriptions     Signed Prescriptions Disp Refills   • Sertraline HCl 100 MG Oral Tab 90 tablet 1     Sig: Take 1 tablet (100 mg total) by mouth daily.

## 2019-06-05 ENCOUNTER — OFFICE VISIT (OUTPATIENT)
Dept: INTERNAL MEDICINE CLINIC | Facility: CLINIC | Age: 39
End: 2019-06-05
Payer: COMMERCIAL

## 2019-06-05 VITALS — BODY MASS INDEX: 29.5 KG/M2 | HEIGHT: 68 IN | WEIGHT: 194.63 LBS

## 2019-06-05 DIAGNOSIS — E66.3 OVERWEIGHT (BMI 25.0-29.9): Primary | ICD-10-CM

## 2019-06-05 PROCEDURE — 97802 MEDICAL NUTRITION INDIV IN: CPT | Performed by: DIETITIAN, REGISTERED

## 2019-06-05 NOTE — PROGRESS NOTES
INITIAL OUTPATIENT NUTRITION CONSULTATION    Nutrition Assessment    Medical Diagnosis: Overweight    Physical Findings: None reported    Client Age and Gender: 41year old female    Marital Status and Occupation: , L&D RN at [employer]: and <70 mg/dL for diabetic patients with  known heart disease.         HDL Cholesterol   Date Value Ref Range Status   05/31/2019 45 40 - 59 mg/dL Final     Comment:       Interpretive Information:   An HDL cholesterol <40 mg/dL is low and constitutes a cor donnell    Spent 45 minutes in consultation with the patient. Nutrition Intervention/Education:  Comprehensive nutrition education and evaluation provided for weight loss.  Pt reports a lifelong struggle with weight that has progressively gotten worse sinc

## 2019-06-06 ENCOUNTER — PATIENT MESSAGE (OUTPATIENT)
Dept: INTERNAL MEDICINE CLINIC | Facility: CLINIC | Age: 39
End: 2019-06-06

## 2019-06-06 NOTE — TELEPHONE ENCOUNTER
From: Giovana Martin  To: Betarice Bowser MD  Sent: 6/6/2019 9:54 AM CDT  Subject: Prescription Question    Hi there! During my physical on Monday you asked me if I needed a refill on my Eletriptan but I had just had it refilled.  Well since then I've

## 2019-06-07 RX ORDER — ELETRIPTAN HYDROBROMIDE 40 MG/1
TABLET, FILM COATED ORAL
Qty: 30 TABLET | Refills: 1 | Status: SHIPPED | OUTPATIENT
Start: 2019-06-07 | End: 2019-07-17

## 2019-06-07 NOTE — TELEPHONE ENCOUNTER
Per TB OV notes 6/3/19: Migraines - continue eletriptan prn  Spoke with pt stating TB had offered to refill rx ELETRIPTAN HYDROBROMIDE 40 MG but at that time pt indicated had received refill but did not realize was only for 6 tablets.  Pt experiences tripp

## 2019-06-09 ENCOUNTER — PATIENT MESSAGE (OUTPATIENT)
Dept: INTERNAL MEDICINE CLINIC | Facility: CLINIC | Age: 39
End: 2019-06-09

## 2019-06-09 DIAGNOSIS — Z86.69 HX OF MIGRAINES: Primary | ICD-10-CM

## 2019-06-11 NOTE — TELEPHONE ENCOUNTER
Advise to see neurologist for daily migraines, insurance will not cover 30 days of medications because it is only supposed to be used once in a while.  Refer to Dr. Kathy Dallas, will likely need to go on prophylactic medication to prevent migraines [place]

## 2019-06-12 ENCOUNTER — OFFICE VISIT (OUTPATIENT)
Dept: INTERNAL MEDICINE CLINIC | Facility: CLINIC | Age: 39
End: 2019-06-12
Payer: COMMERCIAL

## 2019-06-12 VITALS
RESPIRATION RATE: 16 BRPM | HEART RATE: 78 BPM | BODY MASS INDEX: 29.86 KG/M2 | DIASTOLIC BLOOD PRESSURE: 78 MMHG | HEIGHT: 68 IN | WEIGHT: 197 LBS | SYSTOLIC BLOOD PRESSURE: 118 MMHG

## 2019-06-12 DIAGNOSIS — Z51.81 ENCOUNTER FOR THERAPEUTIC DRUG MONITORING: Primary | ICD-10-CM

## 2019-06-12 DIAGNOSIS — G43.709 CHRONIC MIGRAINE WITHOUT AURA WITHOUT STATUS MIGRAINOSUS, NOT INTRACTABLE: ICD-10-CM

## 2019-06-12 DIAGNOSIS — E66.9 OBESITY (BMI 30-39.9): ICD-10-CM

## 2019-06-12 PROCEDURE — 99214 OFFICE O/P EST MOD 30 MIN: CPT | Performed by: INTERNAL MEDICINE

## 2019-06-12 RX ORDER — TOPIRAMATE 25 MG/1
TABLET ORAL
Qty: 60 TABLET | Refills: 0 | Status: SHIPPED | OUTPATIENT
Start: 2019-06-12 | End: 2019-06-12

## 2019-06-12 RX ORDER — TOPIRAMATE 25 MG/1
25 TABLET ORAL 2 TIMES DAILY
Qty: 60 TABLET | Refills: 0 | Status: SHIPPED | OUTPATIENT
Start: 2019-06-12 | End: 2019-07-12

## 2019-06-12 RX ORDER — LIRAGLUTIDE 6 MG/ML
3 INJECTION, SOLUTION SUBCUTANEOUS DAILY
Qty: 5 PEN | Refills: 2 | Status: SHIPPED | OUTPATIENT
Start: 2019-06-12 | End: 2019-08-07

## 2019-06-12 NOTE — PROGRESS NOTES
HISTORY OF PRESENT ILLNESS  Patient presents with:  Weight Check: down 10 lb      Real Opal Martin is a 41year old female here for follow up in medical weight loss program.     Denies chest pain, shortness of breath, dizziness, blurred vision, headach 80 05/31/2019    AST 12 (L) 05/31/2019    ALT 19 05/31/2019    BILT 1.2 05/31/2019    TP 7.5 05/31/2019    ALB 4.2 05/31/2019    GLOBULT 2.4 06/26/2012    GLOBULIN 3.3 05/31/2019    ALBGLOBRAT 1.8 06/26/2012     05/31/2019    K 4.0 05/31/2019    CL 1 therapeutic drug monitoring    Obesity (BMI 30-39. 9)        PLAN  · Initial consult: 207 lb on 6/3/19   · Reviewed headaches and saxenda  · Trial off of medication and no improvement  · Continue saxenda, increase as tolerated  · Start topamax 25 mg bid  ·

## 2019-06-19 ENCOUNTER — TELEPHONE (OUTPATIENT)
Dept: NEUROLOGY | Facility: CLINIC | Age: 39
End: 2019-06-19

## 2019-06-20 ENCOUNTER — TELEPHONE (OUTPATIENT)
Dept: INTERNAL MEDICINE CLINIC | Facility: CLINIC | Age: 39
End: 2019-06-20

## 2019-06-20 RX ORDER — BUTALBITAL, ACETAMINOPHEN AND CAFFEINE 50; 325; 40 MG/1; MG/1; MG/1
1 TABLET ORAL EVERY 8 HOURS PRN
Qty: 20 TABLET | Refills: 0 | Status: SHIPPED | OUTPATIENT
Start: 2019-06-20 | End: 2020-12-03

## 2019-07-17 ENCOUNTER — OFFICE VISIT (OUTPATIENT)
Dept: NEUROLOGY | Facility: CLINIC | Age: 39
End: 2019-07-17
Payer: COMMERCIAL

## 2019-07-17 VITALS
SYSTOLIC BLOOD PRESSURE: 106 MMHG | HEART RATE: 76 BPM | BODY MASS INDEX: 28 KG/M2 | RESPIRATION RATE: 16 BRPM | DIASTOLIC BLOOD PRESSURE: 76 MMHG | WEIGHT: 186 LBS

## 2019-07-17 DIAGNOSIS — R51.9 CHRONIC DAILY HEADACHE: ICD-10-CM

## 2019-07-17 DIAGNOSIS — G43.009 MIGRAINE WITHOUT AURA AND WITHOUT STATUS MIGRAINOSUS, NOT INTRACTABLE: Primary | ICD-10-CM

## 2019-07-17 PROCEDURE — 99244 OFF/OP CNSLTJ NEW/EST MOD 40: CPT | Performed by: OTHER

## 2019-07-17 RX ORDER — ELETRIPTAN HYDROBROMIDE 40 MG/1
TABLET, FILM COATED ORAL
Qty: 10 TABLET | Refills: 1 | Status: SHIPPED | OUTPATIENT
Start: 2019-07-17 | End: 2020-12-03

## 2019-07-17 RX ORDER — TOPIRAMATE 25 MG/1
25 TABLET ORAL 2 TIMES DAILY
COMMUNITY
End: 2019-07-17

## 2019-07-17 RX ORDER — TOPIRAMATE 25 MG/1
TABLET ORAL
Qty: 90 TABLET | Refills: 2 | Status: SHIPPED | OUTPATIENT
Start: 2019-07-17 | End: 2019-08-20

## 2019-07-17 NOTE — PROGRESS NOTES
HPI:    Patient ID: Alean Sandifer is a 41year old female. PCP: Dr Keyona Johnson is a 41year old pleasant female who presents for evaluation of chronic daily headaches.  She has history of migraines since high school and gets 2-3 migrain Grandmother    • Other (Endometriosis) Sister    • Other (Rectal Prolapse) Sister       Social History    Tobacco Use      Smoking status: Former Smoker        Years: 10.00        Quit date: 2013        Years since quittin.2      Smokeless tobacco Intrauterine IUD 1 each by Intrauterine route once. Disp:  Rfl:      Allergies:No Known Allergies   PHYSICAL EXAM:   Physical Exam  Blood pressure 106/76, pulse 76, resp. rate 16, weight 186 lb, not currently breastfeeding.     Vitals reviewed  General: wel on other headache triggers    Patient indicated understanding and will see him back as needed  Thank you for allowing us to participate in your patient's care. Please do not hesitate to call if you have any questions.        MD Laila Khan Sender

## 2019-08-07 ENCOUNTER — OFFICE VISIT (OUTPATIENT)
Dept: INTERNAL MEDICINE CLINIC | Facility: CLINIC | Age: 39
End: 2019-08-07
Payer: COMMERCIAL

## 2019-08-07 VITALS
SYSTOLIC BLOOD PRESSURE: 122 MMHG | HEART RATE: 78 BPM | HEIGHT: 68 IN | WEIGHT: 180 LBS | BODY MASS INDEX: 27.28 KG/M2 | RESPIRATION RATE: 16 BRPM | DIASTOLIC BLOOD PRESSURE: 80 MMHG

## 2019-08-07 DIAGNOSIS — E66.9 OBESITY (BMI 30-39.9): ICD-10-CM

## 2019-08-07 DIAGNOSIS — Z51.81 ENCOUNTER FOR THERAPEUTIC DRUG MONITORING: Primary | ICD-10-CM

## 2019-08-07 DIAGNOSIS — G43.709 CHRONIC MIGRAINE WITHOUT AURA WITHOUT STATUS MIGRAINOSUS, NOT INTRACTABLE: ICD-10-CM

## 2019-08-07 PROCEDURE — 99214 OFFICE O/P EST MOD 30 MIN: CPT | Performed by: INTERNAL MEDICINE

## 2019-08-07 RX ORDER — LIRAGLUTIDE 6 MG/ML
3 INJECTION, SOLUTION SUBCUTANEOUS DAILY
Qty: 5 PEN | Refills: 2 | Status: SHIPPED | OUTPATIENT
Start: 2019-08-07 | End: 2020-01-28

## 2019-08-07 NOTE — PROGRESS NOTES
HISTORY OF PRESENT ILLNESS  Patient presents with:  Weight Check: down 17 lb/saxenda 2.4 mg      Memo Martin is a 41year old female here for follow up in medical weight loss program.     Denies chest pain, shortness of breath, dizziness, blurred 05/31/2019    AST 12 (L) 05/31/2019    ALT 19 05/31/2019    BILT 1.2 05/31/2019    TP 7.5 05/31/2019    ALB 4.2 05/31/2019    GLOBULT 2.4 06/26/2012    GLOBULIN 3.3 05/31/2019    ALBGLOBRAT 1.8 06/26/2012     05/31/2019    K 4.0 05/31/2019     intractable    Other orders  -     SAXENDA 18 MG/3ML Subcutaneous Solution Pen-injector; Inject 3 mg into the skin daily.         PLAN  · Initial consult: 207 lb on 6/3/19   · Reviewed headaches and saxenda  · Trial off of medication and no improvement  · C

## 2019-08-19 ENCOUNTER — PATIENT MESSAGE (OUTPATIENT)
Dept: NEUROLOGY | Facility: CLINIC | Age: 39
End: 2019-08-19

## 2019-08-19 DIAGNOSIS — G43.009 MIGRAINE WITHOUT AURA AND WITHOUT STATUS MIGRAINOSUS, NOT INTRACTABLE: Primary | ICD-10-CM

## 2019-08-19 DIAGNOSIS — R51.9 CHRONIC DAILY HEADACHE: ICD-10-CM

## 2019-08-19 NOTE — PROGRESS NOTES
Pt contacted office via [address] to request 90 day supply of Topiramate. Order pended and routed to provider for signature.

## 2019-08-19 NOTE — TELEPHONE ENCOUNTER
From: Caitlin Martin  To: Chago Anderson MD  Sent: 8/19/2019 12:34 PM CDT  Subject: Prescription Question    Dr. Brenda Edwards,    Good afternoon! The Topirimate is working well and I have remained migraine free!  I continue to have almost daily headac

## 2019-08-20 RX ORDER — TOPIRAMATE 25 MG/1
TABLET ORAL
Qty: 270 TABLET | Refills: 1 | Status: SHIPPED | OUTPATIENT
Start: 2019-08-20 | End: 2020-11-09

## 2019-08-25 ENCOUNTER — PATIENT MESSAGE (OUTPATIENT)
Dept: INTERNAL MEDICINE CLINIC | Facility: CLINIC | Age: 39
End: 2019-08-25

## 2019-08-26 RX ORDER — SERTRALINE HYDROCHLORIDE 100 MG/1
100 TABLET, FILM COATED ORAL DAILY
Qty: 90 TABLET | Refills: 1 | Status: SHIPPED | OUTPATIENT
Start: 2019-08-26 | End: 2020-04-28

## 2019-08-26 RX ORDER — METOPROLOL SUCCINATE 50 MG/1
TABLET, EXTENDED RELEASE ORAL
Qty: 90 TABLET | Refills: 1 | Status: SHIPPED | OUTPATIENT
Start: 2019-08-26 | End: 2020-05-21

## 2019-08-26 NOTE — TELEPHONE ENCOUNTER
From: Yolette Martin  To: Horace Babcock MD  Sent: 8/25/2019 3:13 PM CDT  Subject: Prescription Question    Dr. Jodi Casanova,    Hi there!  Nothing urgent, just looking to transfer my current Zoloft and Metoprolol prescriptions to 90 day mail order suppl

## 2019-08-26 NOTE — PROGRESS NOTES
Last Ov: 6/3/19, TB, physical  Upcoming appt: no upcoming appt  Last labs: CBC, CMP, Lipid, TSH w Ref T4, Celiac Disease panel 5/31/19  Last Rx:   Metoprolol 50mg, #90, 3R 4/25/19  Sertraline 100mg, #90, 1R 6/3/19    Per Protocol, metoprolol passed.  Refill

## 2019-11-28 ENCOUNTER — APPOINTMENT (OUTPATIENT)
Dept: GENERAL RADIOLOGY | Age: 39
End: 2019-11-28
Attending: FAMILY MEDICINE
Payer: COMMERCIAL

## 2019-11-28 ENCOUNTER — HOSPITAL ENCOUNTER (OUTPATIENT)
Age: 39
Discharge: HOME | End: 2019-11-28
Attending: FAMILY MEDICINE
Payer: COMMERCIAL

## 2019-11-28 VITALS
DIASTOLIC BLOOD PRESSURE: 60 MMHG | HEART RATE: 65 BPM | TEMPERATURE: 98 F | RESPIRATION RATE: 16 BRPM | OXYGEN SATURATION: 99 % | SYSTOLIC BLOOD PRESSURE: 92 MMHG

## 2019-11-28 DIAGNOSIS — S92.512A CLOSED DISPLACED FRACTURE OF PROXIMAL PHALANX OF LESSER TOE OF LEFT FOOT, INITIAL ENCOUNTER: Primary | ICD-10-CM

## 2019-11-28 PROCEDURE — 99213 OFFICE O/P EST LOW 20 MIN: CPT

## 2019-11-28 PROCEDURE — 28510 TREATMENT OF TOE FRACTURE: CPT

## 2019-11-28 PROCEDURE — 73630 X-RAY EXAM OF FOOT: CPT | Performed by: FAMILY MEDICINE

## 2019-11-28 PROCEDURE — 99202 OFFICE O/P NEW SF 15 MIN: CPT

## 2019-11-28 NOTE — ED INITIAL ASSESSMENT (HPI)
Pt tripped last night injuring and accidentally kicking furniture with left foot. Pt has been icing and elevating at home.

## 2019-11-28 NOTE — ED PROVIDER NOTES
Patient Seen in: [address] [facility]      History   Patient presents with:  Lower Extremity Injury (musculoskeletal)    Stated Complaint: left toe pain    HPI  53-year-old female coming in with complaints of toe pain status post injury.   Last 9212 Temporal   SpO2 11/28/19 0843 99 %   O2 Device 11/28/19 0843 None (Room air)       Current:BP 92/60   Pulse 65   Temp 97.8 °F (36.6 °C) (Temporal)   Resp 16   SpO2 99%         Physical Exam    GENERAL: well developed, well nourished,in no apparent dis spaces. Small calcaneal spur at the insertion of the left plantar fascia noted. SOFT TISSUES:  Negative. No visible soft tissue swelling. EFFUSION:  None visible. OTHER:  Negative. CONCLUSION:  No acute fracture.    Dictated by: Palma Rojas MD

## 2019-12-04 ENCOUNTER — OFFICE VISIT (OUTPATIENT)
Dept: ORTHOPEDICS CLINIC | Facility: CLINIC | Age: 39
End: 2019-12-04
Payer: COMMERCIAL

## 2019-12-04 VITALS
OXYGEN SATURATION: 98 % | HEIGHT: 68 IN | BODY MASS INDEX: 25.76 KG/M2 | WEIGHT: 170 LBS | HEART RATE: 70 BPM | RESPIRATION RATE: 14 BRPM

## 2019-12-04 DIAGNOSIS — S92.512A DISPLACED FRACTURE OF PROXIMAL PHALANX OF LEFT LESSER TOE(S), INITIAL ENCOUNTER FOR CLOSED FRACTURE: Primary | ICD-10-CM

## 2019-12-04 PROCEDURE — 99203 OFFICE O/P NEW LOW 30 MIN: CPT | Performed by: ORTHOPAEDIC SURGERY

## 2019-12-04 PROCEDURE — L4387 NON-PNEUM WALK BOOT PRE OTS: HCPCS | Performed by: ORTHOPAEDIC SURGERY

## 2019-12-04 NOTE — H&P
[facility] New Patient Note    CC: Patient presents with:  Consult: left 4th toe pain started 1 week ago when she stumbled and kicked her couch. She noted her 4th toe was deformed and \"hanging\" below her 3rd and 5th toe.  Patient said she moved her Succinate ER 50 MG Oral Tablet 24 Hr TAKE 1 TABLET(50 MG) BY MOUTH EVERY DAY 90 tablet 1   • topiramate 25 MG Oral Tab 25 mg AM and 50 mg  tablet 1   • SAXENDA 18 MG/3ML Subcutaneous Solution Pen-injector Inject 3 mg into the skin daily.  5 pen 2   • frequency: Never        Comment: Cage done 6/3/19       Drug use: No      Sexual activity: Yes       ROS:  Detailed system review obtained and negative except as mentioned above      Physical Exam:    Pulse 70   Resp 14   Ht 68\"   Wt 170 lb (77.1 kg)   Sp taping for the next 2 to 3 weeks as well as offered a walking boot, which she agreed to. I advised using the walking boot while weightbearing for the next 3 weeks until her next clinic follow-up. She will continue anti-inflammatories as needed for pain.

## 2020-01-28 ENCOUNTER — OFFICE VISIT (OUTPATIENT)
Dept: INTERNAL MEDICINE CLINIC | Facility: CLINIC | Age: 40
End: 2020-01-28
Payer: COMMERCIAL

## 2020-01-28 VITALS
HEART RATE: 78 BPM | DIASTOLIC BLOOD PRESSURE: 76 MMHG | HEIGHT: 68 IN | SYSTOLIC BLOOD PRESSURE: 118 MMHG | WEIGHT: 175 LBS | RESPIRATION RATE: 16 BRPM | BODY MASS INDEX: 26.52 KG/M2

## 2020-01-28 DIAGNOSIS — E66.9 OBESITY (BMI 30-39.9): ICD-10-CM

## 2020-01-28 DIAGNOSIS — Z51.81 ENCOUNTER FOR THERAPEUTIC DRUG MONITORING: Primary | ICD-10-CM

## 2020-01-28 DIAGNOSIS — G43.709 CHRONIC MIGRAINE WITHOUT AURA WITHOUT STATUS MIGRAINOSUS, NOT INTRACTABLE: ICD-10-CM

## 2020-01-28 DIAGNOSIS — I49.3 PVC (PREMATURE VENTRICULAR CONTRACTION): ICD-10-CM

## 2020-01-28 PROCEDURE — 99214 OFFICE O/P EST MOD 30 MIN: CPT | Performed by: INTERNAL MEDICINE

## 2020-01-28 RX ORDER — LIRAGLUTIDE 6 MG/ML
3 INJECTION, SOLUTION SUBCUTANEOUS DAILY
Qty: 5 PEN | Refills: 1 | Status: SHIPPED | OUTPATIENT
Start: 2020-01-28 | End: 2020-03-30

## 2020-01-28 RX ORDER — PHENTERMINE HYDROCHLORIDE 15 MG/1
15 CAPSULE ORAL EVERY MORNING
Qty: 30 CAPSULE | Refills: 1 | Status: SHIPPED | OUTPATIENT
Start: 2020-01-28 | End: 2020-04-22

## 2020-01-28 NOTE — PROGRESS NOTES
HISTORY OF PRESENT ILLNESS  Patient presents with:  Weight Check: down 5 lb      Carlos Manuel Martin is a 42year old female here for follow up in medical weight loss program.     Denies chest pain, shortness of breath, dizziness, blurred vision, headache BUN 13 05/31/2019    BUNCREA 18.3 05/31/2019    CREATSERUM 0.71 05/31/2019    ANIONGAP 7 05/31/2019    GFRNAA 110 05/31/2019    GFRAA 127 05/31/2019    CA 9.1 05/31/2019    OSMOCALC 292 05/31/2019    ALKPHO 80 05/31/2019    AST 12 (L) 05/31/2019    ALT 19 and all orders for this visit:    Encounter for therapeutic drug monitoring    Obesity (BMI 30-39. 9)    Chronic migraine without aura without status migrainosus, not intractable    PVC (premature ventricular contraction)    Other orders  -     SAXENDA 18 M

## 2020-03-26 NOTE — TELEPHONE ENCOUNTER
Requesting Marnie  LOV: 1/28/20  RTC: not noted  Last Relevant Labs: na  Filled: 1/28/20 #5 pens with 1 refills    Future Appointments   Date Time Provider Ángela Dwyer   4/3/2020 11:50 AM Lexie Arrington MD [id number]  [facility]     No appt made.   Sent my chart to schedule

## 2020-03-30 RX ORDER — LIRAGLUTIDE 6 MG/ML
INJECTION, SOLUTION SUBCUTANEOUS
Qty: 15 ML | Refills: 0 | Status: SHIPPED | OUTPATIENT
Start: 2020-03-30 | End: 2020-06-02

## 2020-03-30 NOTE — TELEPHONE ENCOUNTER
[id number] Kaylee Grvoer for restart with saxenda   Please review dosing with patient   Likely will need tele visit or OV in 4-6 weeks to assess how she is doing with it

## 2020-04-22 ENCOUNTER — VIRTUAL PHONE E/M (OUTPATIENT)
Dept: INTERNAL MEDICINE CLINIC | Facility: CLINIC | Age: 40
End: 2020-04-22

## 2020-04-22 DIAGNOSIS — Z51.81 ENCOUNTER FOR THERAPEUTIC DRUG MONITORING: Primary | ICD-10-CM

## 2020-04-22 DIAGNOSIS — E66.9 OBESITY (BMI 30-39.9): ICD-10-CM

## 2020-04-22 PROCEDURE — 99213 OFFICE O/P EST LOW 20 MIN: CPT | Performed by: INTERNAL MEDICINE

## 2020-04-22 RX ORDER — DIETHYLPROPION HYDROCHLORIDE 75 MG/1
1 TABLET ORAL EVERY MORNING
Qty: 30 TABLET | Refills: 0 | Status: SHIPPED | OUTPATIENT
Start: 2020-04-22 | End: 2020-06-02

## 2020-04-22 NOTE — PROGRESS NOTES
[facility] Weight Management check in/follow up encounter  Virtual/Telephone Check-In    Matthew Martin verbally consents to a Virtual/Telephone Check-In service on 04/22/20      Patient understands and accepts financial responsibility for any d

## 2020-04-28 RX ORDER — SERTRALINE HYDROCHLORIDE 100 MG/1
100 TABLET, FILM COATED ORAL DAILY
Qty: 90 TABLET | Refills: 1 | Status: SHIPPED | OUTPATIENT
Start: 2020-04-28 | End: 2021-01-14

## 2020-04-28 NOTE — TELEPHONE ENCOUNTER
Last OV: 6/3/19 annual PE    Upcoming OV: no upcoming appt     Latest labs: 5/31/19 Celiac Disease, TSH w/ ref, Lipid, CMP and CBC    Latest RX: Sertraline HCl 100 MG Oral Tab 90 tabs 1 refill on 8/26/19    Per protocol, not on protocol. Rx pending.

## 2020-05-05 ENCOUNTER — TELEPHONE (OUTPATIENT)
Dept: INTERNAL MEDICINE CLINIC | Facility: CLINIC | Age: 40
End: 2020-05-05

## 2020-05-05 DIAGNOSIS — Z13.0 SCREENING FOR BLOOD DISEASE: ICD-10-CM

## 2020-05-05 DIAGNOSIS — Z00.00 ROUTINE GENERAL MEDICAL EXAMINATION AT A HEALTH CARE FACILITY: Primary | ICD-10-CM

## 2020-05-05 DIAGNOSIS — Z13.220 SCREENING FOR LIPID DISORDERS: ICD-10-CM

## 2020-05-05 DIAGNOSIS — Z13.228 SCREENING FOR METABOLIC DISORDER: ICD-10-CM

## 2020-05-05 DIAGNOSIS — Z13.29 SCREENING FOR THYROID DISORDER: ICD-10-CM

## 2020-05-05 NOTE — TELEPHONE ENCOUNTER
Future Appointments   Date Time Provider Ángela Yuliya   7/1/2020  9:40 AM Ruddy Leung MD [facility]     CPE with TB 7/1/2020

## 2020-05-05 NOTE — TELEPHONE ENCOUNTER
CPE with TB 7/1/2020      Future Appointments   Date Time Provider Ángela Haywardi   7/1/2020  9:40 AM Bethany Serrano MD [facility]       Pt would like fasting labs sent to [address] [facility]. Pt aware to complete 5-7 days ahead.

## 2020-06-02 ENCOUNTER — VIRTUAL PHONE E/M (OUTPATIENT)
Dept: INTERNAL MEDICINE CLINIC | Facility: CLINIC | Age: 40
End: 2020-06-02

## 2020-06-02 DIAGNOSIS — E66.3 OVERWEIGHT (BMI 25.0-29.9): ICD-10-CM

## 2020-06-02 DIAGNOSIS — Z51.81 ENCOUNTER FOR THERAPEUTIC DRUG MONITORING: Primary | ICD-10-CM

## 2020-06-02 PROCEDURE — 99213 OFFICE O/P EST LOW 20 MIN: CPT | Performed by: PHYSICIAN ASSISTANT

## 2020-06-02 RX ORDER — LIRAGLUTIDE 6 MG/ML
3 INJECTION, SOLUTION SUBCUTANEOUS DAILY
Qty: 15 PEN | Refills: 0 | Status: SHIPPED | OUTPATIENT
Start: 2020-06-02 | End: 2020-06-10

## 2020-06-02 RX ORDER — DIETHYLPROPION HYDROCHLORIDE 75 MG/1
1 TABLET ORAL EVERY MORNING
Qty: 30 TABLET | Refills: 1 | Status: SHIPPED | OUTPATIENT
Start: 2020-06-02 | End: 2020-07-02

## 2020-06-03 NOTE — PATIENT INSTRUCTIONS
We are here to support you with weight loss, but please remember that you still need your primary care provider for your routine health maintenance.       PLAN:  Continue medications  Follow-up in 2 months     Please try to work on the following dietary anton 2. \"7 minute workout\" to help with exercise/activity which takes 7 minutes of your day and that you can do at home! 3. \"Calm\" or \"Headspace\" which helps with mindfulness, meditation, clarity, sleep, and ivanna to your daily life.    4. Noquo blog

## 2020-06-03 NOTE — PROGRESS NOTES
Virtual Telephone Check-In    Dandy Martin verbally consents to a Virtual/Telephone Check-In visit on 6/2/2020.     Patient understands and accepts financial responsibility for any deductible, co-insurance and/or co-pays associated with this service normal logic and thought process   Patient speaking in full sentences, no increased work of breathing    Assessment/Plan:   Diagnoses and all orders for this visit:    Encounter for therapeutic drug monitoring    Overweight (BMI 25.0-29. 9)    Other orders

## 2020-06-09 ENCOUNTER — PATIENT MESSAGE (OUTPATIENT)
Dept: INTERNAL MEDICINE CLINIC | Facility: CLINIC | Age: 40
End: 2020-06-09

## 2020-06-10 ENCOUNTER — PATIENT MESSAGE (OUTPATIENT)
Dept: INTERNAL MEDICINE CLINIC | Facility: CLINIC | Age: 40
End: 2020-06-10

## 2020-06-10 RX ORDER — LIRAGLUTIDE 6 MG/ML
3 INJECTION, SOLUTION SUBCUTANEOUS DAILY
Qty: 15 PEN | Refills: 0 | Status: SHIPPED | OUTPATIENT
Start: 2020-06-10 | End: 2020-09-10

## 2020-06-10 RX ORDER — LIRAGLUTIDE 6 MG/ML
3 INJECTION, SOLUTION SUBCUTANEOUS DAILY
Qty: 15 PEN | Refills: 0 | Status: SHIPPED | OUTPATIENT
Start: 2020-06-10 | End: 2020-06-10

## 2020-06-10 NOTE — TELEPHONE ENCOUNTER
From: Monet Martin  To:  Delma Santoyo PA-C  Sent: 6/10/2020 11:53 AM CDT  Subject: Prescription Question    Sahil Bender,  Thank you for your attention to this but can you please send the 90 day Saxenda refill to the [facility] off [address] in [place] that I have

## 2020-06-10 NOTE — TELEPHONE ENCOUNTER
From: Alize Martin  To: Coni Barrios PA-C  Sent: 6/9/2020 6:17 PM CDT  Subject: Prescription Question    Tommy Acosta! It was so nice talking with you last week and I'm so sorry to bother you with this.  For some reason my Saxenda script didn'

## 2020-06-22 ENCOUNTER — LAB ENCOUNTER (OUTPATIENT)
Dept: LAB | Age: 40
End: 2020-06-22
Attending: INTERNAL MEDICINE
Payer: COMMERCIAL

## 2020-06-22 DIAGNOSIS — Z13.0 SCREENING FOR BLOOD DISEASE: ICD-10-CM

## 2020-06-22 DIAGNOSIS — Z13.228 SCREENING FOR METABOLIC DISORDER: ICD-10-CM

## 2020-06-22 DIAGNOSIS — Z13.29 SCREENING FOR THYROID DISORDER: ICD-10-CM

## 2020-06-22 DIAGNOSIS — Z00.00 ROUTINE GENERAL MEDICAL EXAMINATION AT A HEALTH CARE FACILITY: ICD-10-CM

## 2020-06-22 DIAGNOSIS — Z13.220 SCREENING FOR LIPID DISORDERS: ICD-10-CM

## 2020-06-22 PROCEDURE — 80053 COMPREHEN METABOLIC PANEL: CPT

## 2020-06-22 PROCEDURE — 84443 ASSAY THYROID STIM HORMONE: CPT

## 2020-06-22 PROCEDURE — 36415 COLL VENOUS BLD VENIPUNCTURE: CPT

## 2020-06-22 PROCEDURE — 85025 COMPLETE CBC W/AUTO DIFF WBC: CPT

## 2020-06-22 PROCEDURE — 80061 LIPID PANEL: CPT

## 2020-07-01 ENCOUNTER — OFFICE VISIT (OUTPATIENT)
Dept: INTERNAL MEDICINE CLINIC | Facility: CLINIC | Age: 40
End: 2020-07-01
Payer: COMMERCIAL

## 2020-07-01 VITALS
SYSTOLIC BLOOD PRESSURE: 124 MMHG | BODY MASS INDEX: 24.86 KG/M2 | TEMPERATURE: 99 F | DIASTOLIC BLOOD PRESSURE: 80 MMHG | HEART RATE: 76 BPM | RESPIRATION RATE: 16 BRPM | WEIGHT: 167.81 LBS | HEIGHT: 68.75 IN

## 2020-07-01 DIAGNOSIS — Z00.00 ROUTINE GENERAL MEDICAL EXAMINATION AT A HEALTH CARE FACILITY: Primary | ICD-10-CM

## 2020-07-01 DIAGNOSIS — F41.9 ANXIETY: ICD-10-CM

## 2020-07-01 PROCEDURE — 99395 PREV VISIT EST AGE 18-39: CPT | Performed by: INTERNAL MEDICINE

## 2020-07-01 RX ORDER — ALPRAZOLAM 0.25 MG/1
0.25 TABLET ORAL NIGHTLY PRN
Qty: 30 TABLET | Refills: 0 | Status: SHIPPED | OUTPATIENT
Start: 2020-07-01

## 2020-07-01 NOTE — PROGRESS NOTES
Patient presents with:  Physical: cn room 4: physical       HPI:    Patient is here for CPE without gyne exam  Has IUD and will see Dr. Anya Rico in a few months for pap and IUD check  Anxiety overall is doing well, still few days where anxiety can get out of Laterality Date   • ANESTH, SECTION     •  SECTION N/A 2017    Performed by Bruna Eisenmenger, MD at [facility] L+D OR   • ORAL SURGERY PROCEDURE      [place] teeth extraction - no complications   • UPPER GI ENDOSCOPY,EXAM      EGD mouth daily.  90 tablet 1   • topiramate 25 MG Oral Tab 25 mg AM and 50 mg PM (Patient taking differently: 50 mg PM ) 270 tablet 1   • Eletriptan Hydrobromide 40 MG Oral Tab TAKE 1 TABLET BY MOUTH AT ONSET AND MAY REPEAT IF NEEDED AFTER 2 HOURS FOR A MAX OF medical examination at a health care facility  (primary encounter diagnosis)- patient due for pap and has appointment with gyne already for this and IUD check, CPE labs WNL, continue healthy diet and regular exercise  Anxiety- continue sertraline 100mg john

## 2020-07-07 RX ORDER — DIETHYLPROPION HYDROCHLORIDE 75 MG/1
TABLET ORAL
Qty: 30 TABLET | Refills: 0 | OUTPATIENT
Start: 2020-07-07

## 2020-07-07 NOTE — TELEPHONE ENCOUNTER
Requesting Diethylpropion  LOV: 6/2/20  RTC: 2 months  Last Relevant Labs: na  Filled: 6/2/20 #30 with 1 refills  [facility]    Future Appointments   Date Time Provider Ángela Dwyer   9/22/2020 12:00 PM Bruna Eisenmenger, MD [facility] 608 [facility]

## 2020-08-25 DIAGNOSIS — Z78.9 PARTICIPANT IN HEALTH AND WELLNESS PLAN: Primary | ICD-10-CM

## 2020-08-26 ENCOUNTER — NURSE ONLY (OUTPATIENT)
Dept: LAB | Age: 40
End: 2020-08-26
Attending: PREVENTIVE MEDICINE
Payer: COMMERCIAL

## 2020-08-26 DIAGNOSIS — Z78.9 PARTICIPANT IN HEALTH AND WELLNESS PLAN: ICD-10-CM

## 2020-08-26 PROCEDURE — 86769 SARS-COV-2 COVID-19 ANTIBODY: CPT

## 2020-08-27 LAB — SARS-COV-2 IGG SERPLBLD QL IA.RAPID: NEGATIVE

## 2020-09-11 RX ORDER — LIRAGLUTIDE 6 MG/ML
3 INJECTION, SOLUTION SUBCUTANEOUS DAILY
Qty: 15 ML | Refills: 0 | Status: SHIPPED | OUTPATIENT
Start: 2020-09-11 | End: 2020-09-14

## 2020-09-14 ENCOUNTER — TELEPHONE (OUTPATIENT)
Dept: INTERNAL MEDICINE CLINIC | Facility: CLINIC | Age: 40
End: 2020-09-14

## 2020-09-14 RX ORDER — LIRAGLUTIDE 6 MG/ML
3 INJECTION, SOLUTION SUBCUTANEOUS DAILY
Qty: 45 ML | Refills: 0 | Status: SHIPPED | OUTPATIENT
Start: 2020-09-14 | End: 2020-10-06

## 2020-09-14 NOTE — TELEPHONE ENCOUNTER
One month refill of Saxenda approved 9/11/20 but must be 3 month in order for insurance to cover. Patient is scheduled. 3 month RX sent now.

## 2020-10-06 ENCOUNTER — OFFICE VISIT (OUTPATIENT)
Dept: INTERNAL MEDICINE CLINIC | Facility: CLINIC | Age: 40
End: 2020-10-06
Payer: COMMERCIAL

## 2020-10-06 VITALS
DIASTOLIC BLOOD PRESSURE: 78 MMHG | BODY MASS INDEX: 25.91 KG/M2 | HEIGHT: 68 IN | RESPIRATION RATE: 16 BRPM | SYSTOLIC BLOOD PRESSURE: 118 MMHG | HEART RATE: 78 BPM | TEMPERATURE: 97 F | WEIGHT: 171 LBS

## 2020-10-06 DIAGNOSIS — E66.3 OVERWEIGHT (BMI 25.0-29.9): ICD-10-CM

## 2020-10-06 DIAGNOSIS — Z51.81 THERAPEUTIC DRUG MONITORING: Primary | ICD-10-CM

## 2020-10-06 PROCEDURE — 3074F SYST BP LT 130 MM HG: CPT | Performed by: INTERNAL MEDICINE

## 2020-10-06 PROCEDURE — 3078F DIAST BP <80 MM HG: CPT | Performed by: INTERNAL MEDICINE

## 2020-10-06 PROCEDURE — 3008F BODY MASS INDEX DOCD: CPT | Performed by: INTERNAL MEDICINE

## 2020-10-06 PROCEDURE — 99214 OFFICE O/P EST MOD 30 MIN: CPT | Performed by: INTERNAL MEDICINE

## 2020-10-06 RX ORDER — DIETHYLPROPION HYDROCHLORIDE 75 MG/1
TABLET ORAL
Qty: 30 TABLET | Refills: 0 | Status: CANCELLED | OUTPATIENT
Start: 2020-10-06

## 2020-10-06 RX ORDER — SEMAGLUTIDE 1.34 MG/ML
1 INJECTION, SOLUTION SUBCUTANEOUS WEEKLY
Qty: 2 PEN | Refills: 1 | Status: SHIPPED | OUTPATIENT
Start: 2020-10-06 | End: 2021-01-27

## 2020-10-06 RX ORDER — PEN NEEDLE, DIABETIC 32GX 5/32"
1 NEEDLE, DISPOSABLE MISCELLANEOUS DAILY
Qty: 100 EACH | Refills: 1 | Status: CANCELLED | OUTPATIENT
Start: 2020-10-06 | End: 2021-10-06

## 2020-10-06 RX ORDER — DIETHYLPROPION HYDROCHLORIDE 75 MG/1
TABLET ORAL
COMMUNITY
Start: 2020-07-29 | End: 2020-10-29

## 2020-10-06 RX ORDER — SEMAGLUTIDE 1.34 MG/ML
0.25 INJECTION, SOLUTION SUBCUTANEOUS
Qty: 1 PEN | Refills: 0 | COMMUNITY
Start: 2020-10-06 | End: 2020-11-03

## 2020-10-06 NOTE — PROGRESS NOTES
HISTORY OF PRESENT ILLNESS  Patient presents with:  Weight Check: down 4 lb      Ripmacie Martin is a 47year old female here for follow up in medical weight loss program.     Denies chest pain, shortness of breath, dizziness, blurred vision, headache 06/22/2020    GFRNAA 89 06/22/2020    GFRAA 103 06/22/2020    CA 8.4 (L) 06/22/2020    OSMOCALC 289 06/22/2020    ALKPHO 75 06/22/2020    AST 13 (L) 06/22/2020    ALT 21 06/22/2020    BILT 1.4 06/22/2020    TP 7.1 06/22/2020    ALB 3.8 06/22/2020    GLOBUL facility-administered medications on file prior to visit. ASSESSMENT  Analyzed weight data:       Diagnoses and all orders for this visit:    Therapeutic drug monitoring    Overweight (BMI 25.0-29. 9)    Other orders  -     Cancel: Diethylpropion HCl of sleep and stress in weight management. · Counseled on comprehensive weight loss plan including attention to nutrition, exercise and behavior/stress management for success. See patient instruction below for more details.   · Discussed strategies to overc

## 2020-10-29 ENCOUNTER — VIRTUAL PHONE E/M (OUTPATIENT)
Dept: INTERNAL MEDICINE CLINIC | Facility: CLINIC | Age: 40
End: 2020-10-29
Payer: COMMERCIAL

## 2020-10-29 DIAGNOSIS — E66.3 OVERWEIGHT (BMI 25.0-29.9): ICD-10-CM

## 2020-10-29 DIAGNOSIS — Z51.81 THERAPEUTIC DRUG MONITORING: Primary | ICD-10-CM

## 2020-10-29 PROCEDURE — 99213 OFFICE O/P EST LOW 20 MIN: CPT | Performed by: INTERNAL MEDICINE

## 2020-10-29 NOTE — PROGRESS NOTES
[facility] Weight Management check in/follow up encounter  Virtual/Telephone Check-In    Jm Martin verbally consents to a Virtual/Telephone Check-In service on 10/29/20  High stress / work related stress.   Not started the ozempic yet  Finis There are limitations of this visit as no physical exam could be performed. Every conscious effort was taken to allow for sufficient and adequate time. This billing was spent on reviewing labs, medications, radiology tests and decision making.   [employer]

## 2020-11-09 DIAGNOSIS — G43.009 MIGRAINE WITHOUT AURA AND WITHOUT STATUS MIGRAINOSUS, NOT INTRACTABLE: ICD-10-CM

## 2020-11-09 DIAGNOSIS — R51.9 CHRONIC DAILY HEADACHE: ICD-10-CM

## 2020-11-09 RX ORDER — TOPIRAMATE 25 MG/1
TABLET ORAL
Qty: 90 TABLET | Refills: 0 | Status: SHIPPED | OUTPATIENT
Start: 2020-11-09 | End: 2020-12-01

## 2020-11-09 NOTE — TELEPHONE ENCOUNTER
Patient states she is taking Topiramate 25mg in am and 50mg nightly. Iris't scheduled on 12/3.     Medication: Topamax    Date of last refill: 8/20/19 (#270/1)  [place]     Last office visit: 7/17/2019  Due back to clinic

## 2020-11-20 ENCOUNTER — PATIENT MESSAGE (OUTPATIENT)
Dept: INTERNAL MEDICINE CLINIC | Facility: CLINIC | Age: 40
End: 2020-11-20

## 2020-11-20 NOTE — TELEPHONE ENCOUNTER
Seen 7/12020, due to return in one year. H/o PVCs, palpitations - controlled with metoprolol  CPE labs WNL    Ok to fill?

## 2020-11-20 NOTE — TELEPHONE ENCOUNTER
From: Carlotta Martin  To: Casey Styles MD  Sent: 11/20/2020 10:27 AM CST  Subject: Prescription Question    Hi Dr. Phani Hanson! I recently had a physical with you in July and we had said you would take over the ordering of most of my prescriptions.

## 2020-11-21 RX ORDER — METOPROLOL SUCCINATE 50 MG/1
TABLET, EXTENDED RELEASE ORAL
Qty: 90 TABLET | Refills: 1 | Status: SHIPPED | OUTPATIENT
Start: 2020-11-21 | End: 2021-03-24 | Stop reason: DRUGHIGH

## 2020-12-01 DIAGNOSIS — G43.009 MIGRAINE WITHOUT AURA AND WITHOUT STATUS MIGRAINOSUS, NOT INTRACTABLE: ICD-10-CM

## 2020-12-01 DIAGNOSIS — R51.9 CHRONIC DAILY HEADACHE: ICD-10-CM

## 2020-12-01 RX ORDER — TOPIRAMATE 25 MG/1
TABLET ORAL
Qty: 90 TABLET | Refills: 0 | Status: SHIPPED | OUTPATIENT
Start: 2020-12-01 | End: 2020-12-03

## 2020-12-01 NOTE — TELEPHONE ENCOUNTER
Medication: TOPIRAMATE 25 MG Oral Tab    Date of last refill: 11/9/20 (#90/0)  [place]    Last office visit: 7/17/19  Due back to clinic per last office note:  6 weeks  Date next office visit scheduled:    Future Iris

## 2020-12-03 ENCOUNTER — TELEMEDICINE (OUTPATIENT)
Dept: NEUROLOGY | Facility: CLINIC | Age: 40
End: 2020-12-03
Payer: COMMERCIAL

## 2020-12-03 DIAGNOSIS — G43.009 MIGRAINE WITHOUT AURA AND WITHOUT STATUS MIGRAINOSUS, NOT INTRACTABLE: Primary | ICD-10-CM

## 2020-12-03 PROCEDURE — 99212 OFFICE O/P EST SF 10 MIN: CPT | Performed by: OTHER

## 2020-12-03 RX ORDER — ELETRIPTAN HYDROBROMIDE 40 MG/1
TABLET, FILM COATED ORAL
Qty: 10 TABLET | Refills: 2 | Status: SHIPPED | OUTPATIENT
Start: 2020-12-03

## 2020-12-03 RX ORDER — TOPIRAMATE 25 MG/1
TABLET ORAL
Qty: 270 TABLET | Refills: 3 | Status: SHIPPED | OUTPATIENT
Start: 2020-12-03 | End: 2022-01-19

## 2020-12-03 NOTE — PROGRESS NOTES
HPI:    Patient ID: Vonna Dakins is a 47year old female. This visit is conducted using Telemedicine with live, interactive video and audio.     Patient has been referred to the [facility] website at [email or web address] to review the yearly Cons History    Tobacco Use      Smoking status: Former Smoker        Years: 10.00        Quit date: 2013        Years since quittin.6      Smokeless tobacco: Never Used      Tobacco comment: 13    Alcohol use:  Yes      Alcohol/week: 1.0 standard old in no apparent distress  HEENT: Normocephalic and atraumatic. Cardiovascular: Not performed   Pulmonary/Chest: Not performed  Abdominal: not performed   NEUROLOGICAL: This patient is alert and orientated x 3.  Speech is fluent with intact comprehensio

## 2020-12-10 ENCOUNTER — TELEMEDICINE (OUTPATIENT)
Dept: INTERNAL MEDICINE CLINIC | Facility: CLINIC | Age: 40
End: 2020-12-10
Payer: COMMERCIAL

## 2020-12-10 DIAGNOSIS — E66.3 OVERWEIGHT (BMI 25.0-29.9): ICD-10-CM

## 2020-12-10 DIAGNOSIS — R00.2 PALPITATIONS: ICD-10-CM

## 2020-12-10 DIAGNOSIS — Z51.81 THERAPEUTIC DRUG MONITORING: Primary | ICD-10-CM

## 2020-12-10 PROCEDURE — 99214 OFFICE O/P EST MOD 30 MIN: CPT | Performed by: INTERNAL MEDICINE

## 2020-12-10 RX ORDER — METOPROLOL SUCCINATE 25 MG/1
25 TABLET, EXTENDED RELEASE ORAL DAILY
Qty: 30 TABLET | Refills: 1 | Status: SHIPPED | OUTPATIENT
Start: 2020-12-10 | End: 2021-01-10

## 2020-12-10 NOTE — PROGRESS NOTES
HISTORY OF PRESENT ILLNESS  Patient presents with:  Weight Check: video check      Gail Martin is a 47year old female here for follow up in medical weight loss program.     Denies chest pain, shortness of breath, dizziness, blurred vision, headac ANIONGAP 6 06/22/2020    GFRNAA 89 06/22/2020    GFRAA 103 06/22/2020    CA 8.4 (L) 06/22/2020    OSMOCALC 289 06/22/2020    ALKPHO 75 06/22/2020    AST 13 (L) 06/22/2020    ALT 21 06/22/2020    BILT 1.4 06/22/2020    TP 7.1 06/22/2020    ALB 3.8 06/22/202 visit.       ASSESSMENT  Analyzed weight data:       Diagnoses and all orders for this visit:    Therapeutic drug monitoring    Overweight (BMI 25.0-29. 9)    Other orders  -     Metoprolol Succinate ER 25 MG Oral Tablet 24 Hr;  Take 1 tablet (25 mg total) b for more details.   · Discussed strategies to overcome barriers to successful weight loss and weight maintenance  · FITTE: ACSM recommendations (150-300 minutes/ week in active weight loss)   · Weight Loss consent to treat reviewed and signed     There are

## 2020-12-18 ENCOUNTER — IMMUNIZATION (OUTPATIENT)
Dept: LAB | Facility: HOSPITAL | Age: 40
End: 2020-12-18
Attending: PREVENTIVE MEDICINE
Payer: COMMERCIAL

## 2020-12-18 DIAGNOSIS — Z23 NEED FOR VACCINATION: ICD-10-CM

## 2020-12-18 PROCEDURE — 0001A PFIZER-BIONTECH COVID-19 VACCINE: CPT

## 2020-12-29 DIAGNOSIS — G43.009 MIGRAINE WITHOUT AURA AND WITHOUT STATUS MIGRAINOSUS, NOT INTRACTABLE: ICD-10-CM

## 2020-12-29 RX ORDER — TOPIRAMATE 25 MG/1
TABLET ORAL
Qty: 90 TABLET | Refills: 0 | OUTPATIENT
Start: 2020-12-29

## 2020-12-29 NOTE — TELEPHONE ENCOUNTER
Pt has a lot of topiramate and does not feel needs to be refilled.   [facility] [address] [facility] [place]

## 2021-01-08 ENCOUNTER — IMMUNIZATION (OUTPATIENT)
Dept: LAB | Facility: HOSPITAL | Age: 41
End: 2021-01-08
Attending: PREVENTIVE MEDICINE

## 2021-01-08 DIAGNOSIS — Z23 NEED FOR VACCINATION: ICD-10-CM

## 2021-01-08 PROCEDURE — 0002A SARSCOV2 VAC 30MCG/0.3ML IM: CPT

## 2021-01-10 RX ORDER — METOPROLOL SUCCINATE 25 MG/1
TABLET, EXTENDED RELEASE ORAL
Qty: 30 TABLET | Refills: 1 | OUTPATIENT
Start: 2021-01-10

## 2021-01-10 NOTE — TELEPHONE ENCOUNTER
Requesting metoprolol  LOV: 12/10/20  RTC: 8 weeks  Last Relevant Labs: na  Filled: 12/10/20 #30 with 1 refills    No future appointments.     Receipt confirmed at pharmacy requesting refill - denied with note as such

## 2021-01-11 RX ORDER — METOPROLOL SUCCINATE 25 MG/1
25 TABLET, EXTENDED RELEASE ORAL DAILY
Qty: 90 TABLET | Refills: 0 | Status: SHIPPED | OUTPATIENT
Start: 2021-01-11 | End: 2021-04-05

## 2021-01-11 NOTE — TELEPHONE ENCOUNTER
Requesting Metoprolol 25 mg   LOV: 12/10/20  RTC: 2 months  Last Relevant Labs: na  Filled: 12/10/20 #30 with 1 refills    No future appointments. Patient requesting 90 day.   Pended

## 2021-01-13 NOTE — TELEPHONE ENCOUNTER
Last Ov:7/1/20  Upcoming appt:none  Last labs:6/22/20 cbc, cmp, lipid, tsh  Last Rx:4/28/20 sertraline 100mg    Per Protocol sent for review

## 2021-01-14 RX ORDER — SERTRALINE HYDROCHLORIDE 100 MG/1
TABLET, FILM COATED ORAL
Qty: 90 TABLET | Refills: 0 | Status: SHIPPED | OUTPATIENT
Start: 2021-01-14 | End: 2021-04-20

## 2021-01-27 RX ORDER — SEMAGLUTIDE 1.34 MG/ML
1 INJECTION, SOLUTION SUBCUTANEOUS WEEKLY
Qty: 3 ML | Refills: 2 | Status: SHIPPED | OUTPATIENT
Start: 2021-01-27 | End: 2021-02-02

## 2021-01-27 NOTE — TELEPHONE ENCOUNTER
Requesting ozempic 1 mg  LOV: 12/10/20  RTC: 8 weeks  Last Relevant Labs: no a1c  Filled: 10/6/20 #2 pens with 1 refills    Future Appointments   Date Time Provider Ángela Dwyer   2/11/2021  2:00 PM Javy Sena MD [facility]   3/24/2021 10:00

## 2021-02-11 ENCOUNTER — TELEMEDICINE (OUTPATIENT)
Dept: INTERNAL MEDICINE CLINIC | Facility: CLINIC | Age: 41
End: 2021-02-11
Payer: COMMERCIAL

## 2021-02-11 DIAGNOSIS — E66.9 OBESITY (BMI 30-39.9): ICD-10-CM

## 2021-02-11 DIAGNOSIS — G43.709 CHRONIC MIGRAINE WITHOUT AURA WITHOUT STATUS MIGRAINOSUS, NOT INTRACTABLE: ICD-10-CM

## 2021-02-11 DIAGNOSIS — Z51.81 THERAPEUTIC DRUG MONITORING: Primary | ICD-10-CM

## 2021-02-11 DIAGNOSIS — I49.3 PVC (PREMATURE VENTRICULAR CONTRACTION): ICD-10-CM

## 2021-02-11 PROCEDURE — 99214 OFFICE O/P EST MOD 30 MIN: CPT | Performed by: INTERNAL MEDICINE

## 2021-02-11 NOTE — PROGRESS NOTES
HISTORY OF PRESENT ILLNESS  Patient presents with:  Weight Check      Carlos Manuel Martin is a 47year old female here for follow up in medical weight loss program.     Denies chest pain, shortness of breath, dizziness, blurred vision, headache, paresthes 06/22/2020    CREATSERUM 0.84 06/22/2020    ANIONGAP 6 06/22/2020    GFRNAA 89 06/22/2020    GFRAA 103 06/22/2020    CA 8.4 (L) 06/22/2020    OSMOCALC 289 06/22/2020    ALKPHO 75 06/22/2020    AST 13 (L) 06/22/2020    ALT 21 06/22/2020    BILT 1.4 06/22/20 medications on file prior to visit. ASSESSMENT  Analyzed weight data:       Diagnoses and all orders for this visit:    Therapeutic drug monitoring    Obesity (BMI 30-39. 9)    PVC (premature ventricular contraction)    Chronic migraine without aura w · Weight Loss consent to treat reviewed and signed     There are no Patient Instructions on file for this visit. Return in about 6 weeks (around 3/25/2021). Patient verbalizes understanding.     Oscar Rojas MD    Pt understands phone/video evaluation

## 2021-03-24 ENCOUNTER — TELEPHONE (OUTPATIENT)
Dept: INTERNAL MEDICINE CLINIC | Facility: CLINIC | Age: 41
End: 2021-03-24

## 2021-03-24 ENCOUNTER — OFFICE VISIT (OUTPATIENT)
Dept: INTERNAL MEDICINE CLINIC | Facility: CLINIC | Age: 41
End: 2021-03-24
Payer: COMMERCIAL

## 2021-03-24 VITALS
BODY MASS INDEX: 23.79 KG/M2 | HEART RATE: 75 BPM | WEIGHT: 157 LBS | SYSTOLIC BLOOD PRESSURE: 80 MMHG | RESPIRATION RATE: 16 BRPM | OXYGEN SATURATION: 99 % | HEIGHT: 68 IN | DIASTOLIC BLOOD PRESSURE: 60 MMHG

## 2021-03-24 DIAGNOSIS — Z51.81 THERAPEUTIC DRUG MONITORING: Primary | ICD-10-CM

## 2021-03-24 DIAGNOSIS — E66.9 OBESITY (BMI 30-39.9): ICD-10-CM

## 2021-03-24 PROCEDURE — 99214 OFFICE O/P EST MOD 30 MIN: CPT | Performed by: INTERNAL MEDICINE

## 2021-03-24 PROCEDURE — 3078F DIAST BP <80 MM HG: CPT | Performed by: INTERNAL MEDICINE

## 2021-03-24 PROCEDURE — 3074F SYST BP LT 130 MM HG: CPT | Performed by: INTERNAL MEDICINE

## 2021-03-24 PROCEDURE — 3008F BODY MASS INDEX DOCD: CPT | Performed by: INTERNAL MEDICINE

## 2021-03-24 RX ORDER — SEMAGLUTIDE 1.34 MG/ML
1 INJECTION, SOLUTION SUBCUTANEOUS WEEKLY
Qty: 9 ML | Refills: 0 | Status: SHIPPED | OUTPATIENT
Start: 2021-03-24 | End: 2021-06-24

## 2021-03-24 RX ORDER — SEMAGLUTIDE 1.34 MG/ML
1 INJECTION, SOLUTION SUBCUTANEOUS WEEKLY
Qty: 2 PEN | Refills: 1 | Status: SHIPPED | OUTPATIENT
Start: 2021-03-24 | End: 2021-03-24

## 2021-03-24 NOTE — PROGRESS NOTES
HISTORY OF PRESENT ILLNESS  Patient presents with:  Weight Check: Last ov 1/28/2020 down 18 lbs Telemed visit 2/11/2021 down 3 lbs      Magalis Salvador Martin is a 47year old female here for follow up in medical weight loss program.     Denies chest pain, s GFRAA 103 06/22/2020    CA 8.4 (L) 06/22/2020    OSMOCALC 289 06/22/2020    ALKPHO 75 06/22/2020    AST 13 (L) 06/22/2020    ALT 21 06/22/2020    BILT 1.4 06/22/2020    TP 7.1 06/22/2020    ALB 3.8 06/22/2020    GLOBULT 2.4 06/26/2012    GLOBULIN 3.3 06/22 visit:    Therapeutic drug monitoring    Obesity (BMI 30-39. 9)    Other orders  -     Lisdexamfetamine Dimesylate (VYVANSE) 40 MG Oral Cap; Take 1 capsule (40 mg total) by mouth daily.  -     Lisdexamfetamine Dimesylate (VYVANSE) 40 MG Oral Cap;  Take 1 cap to overcome barriers to successful weight loss and weight maintenance  · FITTE: ACSM recommendations (150-300 minutes/ week in active weight loss)   · Weight Loss consent to treat reviewed and signed     There are no Patient Instructions on file for this v

## 2021-04-05 RX ORDER — METOPROLOL SUCCINATE 25 MG/1
TABLET, EXTENDED RELEASE ORAL
Qty: 90 TABLET | Refills: 0 | Status: SHIPPED | OUTPATIENT
Start: 2021-04-05 | End: 2021-06-28

## 2021-04-05 NOTE — TELEPHONE ENCOUNTER
Requesting Metoprolol 25 mg  LOV: 3/24/21  RTC: not noted  Last Relevant Labs: 6/22/20  Filled: 1/11/21 #90 with 0 refills    Future Appointments   Date Time Provider Ángela Dwyer   6/24/2021 10:00 AM Javy Sena MD [facility]     pended

## 2021-04-20 RX ORDER — SERTRALINE HYDROCHLORIDE 100 MG/1
TABLET, FILM COATED ORAL
Qty: 90 TABLET | Refills: 0 | Status: SHIPPED | OUTPATIENT
Start: 2021-04-20 | End: 2021-07-15

## 2021-04-20 NOTE — TELEPHONE ENCOUNTER
Last Ov:7/1/20  Upcoming appt: none  Last labs:6/22/20 cbc, cmp, lipid, tsh  Last Rx:1/14/21 sertraline    Per Protocol sent for review

## 2021-06-09 DIAGNOSIS — G43.009 MIGRAINE WITHOUT AURA AND WITHOUT STATUS MIGRAINOSUS, NOT INTRACTABLE: ICD-10-CM

## 2021-06-10 RX ORDER — TOPIRAMATE 25 MG/1
TABLET ORAL
Qty: 90 TABLET | Refills: 0 | OUTPATIENT
Start: 2021-06-10

## 2021-06-10 NOTE — TELEPHONE ENCOUNTER
Spoke with Freddy Causey at the pharmacy who states they did not received the prescription from 12/03/2020. Called in prescription. Freddy Causey confirmed a reback.        Medication: TOPIRAMATE 25 MG Oral Tab    Date of last refill: 12/03/2020 (#270/3)  Date last filled pe

## 2021-06-15 ENCOUNTER — TELEPHONE (OUTPATIENT)
Dept: ORTHOPEDICS CLINIC | Facility: CLINIC | Age: 41
End: 2021-06-15

## 2021-06-15 NOTE — TELEPHONE ENCOUNTER
Called patient to see where her pain is so I know which x-rays need to be ordered and completed before her appointment tomorrow.

## 2021-06-16 ENCOUNTER — OFFICE VISIT (OUTPATIENT)
Dept: ORTHOPEDICS CLINIC | Facility: CLINIC | Age: 41
End: 2021-06-16
Payer: COMMERCIAL

## 2021-06-16 VITALS — OXYGEN SATURATION: 100 % | HEART RATE: 69 BPM

## 2021-06-16 DIAGNOSIS — M25.551 RIGHT HIP PAIN: ICD-10-CM

## 2021-06-16 DIAGNOSIS — M46.1 SI (SACROILIAC) JOINT INFLAMMATION (HCC): Primary | ICD-10-CM

## 2021-06-16 PROCEDURE — 99213 OFFICE O/P EST LOW 20 MIN: CPT | Performed by: NURSE PRACTITIONER

## 2021-06-16 RX ORDER — DICLOFENAC SODIUM 75 MG/1
75 TABLET, DELAYED RELEASE ORAL 2 TIMES DAILY
Qty: 60 TABLET | Refills: 1 | Status: SHIPPED | OUTPATIENT
Start: 2021-06-16 | End: 2022-01-19

## 2021-06-16 NOTE — PROGRESS NOTES
[facility] New Patient Note    CC: Patient presents with: Other: right hip/glute pain    HPI: This 47year old female presents today with complaints of right posterior hip pain for the about the past 6 months.   Patient exercises regularly noticed th Subcutaneous Solution Pen-injector Inject 1 mg into the skin once a week.  3 mL 2     No Known Allergies  Family History   Problem Relation Age of Onset   • Bipolar Disorder Father    • Depression Father    • Diabetes Father    • Alcohol and Other Disorders I did have a discussion with her regarding conservative treatment of SI joint inflammation.   At this point I will start her on Voltaren 1 tab twice a day along with formal physical therapy to work on range of motion stretching and strengthening of both l

## 2021-06-24 ENCOUNTER — TELEMEDICINE (OUTPATIENT)
Dept: INTERNAL MEDICINE CLINIC | Facility: CLINIC | Age: 41
End: 2021-06-24

## 2021-06-24 DIAGNOSIS — Z51.81 THERAPEUTIC DRUG MONITORING: Primary | ICD-10-CM

## 2021-06-24 DIAGNOSIS — E66.9 OBESITY (BMI 30-39.9): ICD-10-CM

## 2021-06-24 DIAGNOSIS — R00.2 PALPITATIONS: ICD-10-CM

## 2021-06-24 DIAGNOSIS — L65.9 HAIR THINNING: ICD-10-CM

## 2021-06-24 PROCEDURE — 99214 OFFICE O/P EST MOD 30 MIN: CPT | Performed by: INTERNAL MEDICINE

## 2021-06-24 RX ORDER — SEMAGLUTIDE 1.34 MG/ML
1 INJECTION, SOLUTION SUBCUTANEOUS WEEKLY
Qty: 9 ML | Refills: 0 | Status: SHIPPED | OUTPATIENT
Start: 2021-06-24 | End: 2021-12-16

## 2021-06-24 NOTE — PATIENT INSTRUCTIONS
[facility]       COVID-19 info: [email or web address]  Get online care: [email or web address]    Address: [address], [address] [place] [address]        Hours:  Open ?  [address]

## 2021-06-24 NOTE — PROGRESS NOTES
HISTORY OF PRESENT ILLNESS  Patient presents with:  Weight Check: brendan Jin is a 47year old female here for follow up in medical weight loss program.     Denies chest pain, shortness of breath, dizziness, blurred vision, headache, pa 06/22/2020    ALKPHO 75 06/22/2020    AST 13 (L) 06/22/2020    ALT 21 06/22/2020    BILT 1.4 06/22/2020    TP 7.1 06/22/2020    ALB 3.8 06/22/2020    GLOBULT 2.4 06/26/2012    GLOBULIN 3.3 06/22/2020    ALBGLOBRAT 1.8 06/26/2012     06/22/2020    K 3 into the skin once a week. -     Lisdexamfetamine Dimesylate (VYVANSE) 40 MG Oral Cap; Take 1 capsule (40 mg total) by mouth daily.  -     Lisdexamfetamine Dimesylate (VYVANSE) 40 MG Oral Cap;  Take 1 capsule (40 mg total) by mouth daily.  -     Lisdexamfe info: [email or web address]  Get online care: [email or web address]    Address: [address], [address]        Hours:  Open ?  Closes [address] check required · Staff we

## 2021-06-28 RX ORDER — METOPROLOL SUCCINATE 25 MG/1
TABLET, EXTENDED RELEASE ORAL
Qty: 90 TABLET | Refills: 0 | Status: SHIPPED | OUTPATIENT
Start: 2021-06-28 | End: 2021-09-20

## 2021-06-28 NOTE — TELEPHONE ENCOUNTER
Requesting metoprolol Suc ER 25 mg  LOV: 6/24/21  RTC: 6 weeks  Last Relevant Labs: 6/22/20  Filled: 4/5/21 #90 with 0 refills    Future Appointments   Date Time Provider Ángela Dwyer   7/16/2021 11:00 AM KYLEE Walker [facility]

## 2021-07-13 ENCOUNTER — LAB ENCOUNTER (OUTPATIENT)
Dept: LAB | Age: 41
End: 2021-07-13
Attending: INTERNAL MEDICINE
Payer: COMMERCIAL

## 2021-07-13 DIAGNOSIS — R00.2 PALPITATIONS: ICD-10-CM

## 2021-07-13 DIAGNOSIS — L65.9 BALDNESS: ICD-10-CM

## 2021-07-13 DIAGNOSIS — L65.9 HAIR THINNING: ICD-10-CM

## 2021-07-13 DIAGNOSIS — Z51.81 ENCOUNTER FOR THERAPEUTIC DRUG MONITORING: Primary | ICD-10-CM

## 2021-07-13 DIAGNOSIS — E66.9 OBESITY (BMI 30-39.9): ICD-10-CM

## 2021-07-13 DIAGNOSIS — E66.9 OBESITY, UNSPECIFIED: ICD-10-CM

## 2021-07-13 DIAGNOSIS — Z51.81 THERAPEUTIC DRUG MONITORING: ICD-10-CM

## 2021-07-13 LAB
ALBUMIN SERPL-MCNC: 4.4 G/DL (ref 3.4–5)
ALBUMIN/GLOB SERPL: 1.5 {RATIO} (ref 1–2)
ALP LIVER SERPL-CCNC: 65 U/L
ALT SERPL-CCNC: 30 U/L
ANION GAP SERPL CALC-SCNC: 3 MMOL/L (ref 0–18)
AST SERPL-CCNC: 14 U/L (ref 15–37)
BASOPHILS # BLD AUTO: 0.04 X10(3) UL (ref 0–0.2)
BASOPHILS NFR BLD AUTO: 0.7 %
BILIRUB SERPL-MCNC: 1 MG/DL (ref 0.1–2)
BUN BLD-MCNC: 12 MG/DL (ref 7–18)
BUN/CREAT SERPL: 17.9 (ref 10–20)
CALCIUM BLD-MCNC: 9.3 MG/DL (ref 8.5–10.1)
CHLORIDE SERPL-SCNC: 110 MMOL/L (ref 98–112)
CHOLEST SMN-MCNC: 193 MG/DL (ref ?–200)
CO2 SERPL-SCNC: 26 MMOL/L (ref 21–32)
CREAT BLD-MCNC: 0.67 MG/DL
DEPRECATED HBV CORE AB SER IA-ACNC: 141.9 NG/ML
DEPRECATED RDW RBC AUTO: 43.6 FL (ref 35.1–46.3)
EOSINOPHIL # BLD AUTO: 0.08 X10(3) UL (ref 0–0.7)
EOSINOPHIL NFR BLD AUTO: 1.3 %
ERYTHROCYTE [DISTWIDTH] IN BLOOD BY AUTOMATED COUNT: 12.3 % (ref 11–15)
FOLATE SERPL-MCNC: 24.1 NG/ML (ref 8.7–?)
GLOBULIN PLAS-MCNC: 3 G/DL (ref 2.8–4.4)
GLUCOSE BLD-MCNC: 86 MG/DL (ref 70–99)
HCT VFR BLD AUTO: 40.3 %
HDLC SERPL-MCNC: 80 MG/DL (ref 40–59)
HGB BLD-MCNC: 13.6 G/DL
IMM GRANULOCYTES # BLD AUTO: 0.02 X10(3) UL (ref 0–1)
IMM GRANULOCYTES NFR BLD: 0.3 %
IRON SATURATION: 20 %
IRON SERPL-MCNC: 68 UG/DL
LDLC SERPL CALC-MCNC: 97 MG/DL (ref ?–100)
LYMPHOCYTES # BLD AUTO: 1.73 X10(3) UL (ref 1–4)
LYMPHOCYTES NFR BLD AUTO: 28.5 %
M PROTEIN MFR SERPL ELPH: 7.4 G/DL (ref 6.4–8.2)
MCH RBC QN AUTO: 32.6 PG (ref 26–34)
MCHC RBC AUTO-ENTMCNC: 33.7 G/DL (ref 31–37)
MCV RBC AUTO: 96.6 FL
MONOCYTES # BLD AUTO: 0.43 X10(3) UL (ref 0.1–1)
MONOCYTES NFR BLD AUTO: 7.1 %
NEUTROPHILS # BLD AUTO: 3.76 X10 (3) UL (ref 1.5–7.7)
NEUTROPHILS # BLD AUTO: 3.76 X10(3) UL (ref 1.5–7.7)
NEUTROPHILS NFR BLD AUTO: 62.1 %
NONHDLC SERPL-MCNC: 113 MG/DL (ref ?–130)
OSMOLALITY SERPL CALC.SUM OF ELEC: 287 MOSM/KG (ref 275–295)
PATIENT FASTING Y/N/NP: NO
PATIENT FASTING Y/N/NP: NO
PLATELET # BLD AUTO: 277 10(3)UL (ref 150–450)
POTASSIUM SERPL-SCNC: 4.2 MMOL/L (ref 3.5–5.1)
RBC # BLD AUTO: 4.17 X10(6)UL
SODIUM SERPL-SCNC: 139 MMOL/L (ref 136–145)
T4 FREE SERPL-MCNC: 0.9 NG/DL (ref 0.8–1.7)
TOTAL IRON BINDING CAPACITY: 332 UG/DL (ref 240–450)
TRANSFERRIN SERPL-MCNC: 223 MG/DL (ref 200–360)
TRIGL SERPL-MCNC: 93 MG/DL (ref 30–149)
TSI SER-ACNC: 1.12 MIU/ML (ref 0.36–3.74)
VIT B12 SERPL-MCNC: 565 PG/ML (ref 193–986)
VIT D+METAB SERPL-MCNC: 38.3 NG/ML (ref 30–100)
VLDLC SERPL CALC-MCNC: 15 MG/DL (ref 0–30)
WBC # BLD AUTO: 6.1 X10(3) UL (ref 4–11)

## 2021-07-13 PROCEDURE — 84439 ASSAY OF FREE THYROXINE: CPT

## 2021-07-13 PROCEDURE — 80061 LIPID PANEL: CPT | Performed by: INTERNAL MEDICINE

## 2021-07-13 PROCEDURE — 82746 ASSAY OF FOLIC ACID SERUM: CPT | Performed by: INTERNAL MEDICINE

## 2021-07-13 PROCEDURE — 82728 ASSAY OF FERRITIN: CPT | Performed by: INTERNAL MEDICINE

## 2021-07-13 PROCEDURE — 83550 IRON BINDING TEST: CPT | Performed by: INTERNAL MEDICINE

## 2021-07-13 PROCEDURE — 84425 ASSAY OF VITAMIN B-1: CPT

## 2021-07-13 PROCEDURE — 83540 ASSAY OF IRON: CPT | Performed by: INTERNAL MEDICINE

## 2021-07-13 PROCEDURE — 80053 COMPREHEN METABOLIC PANEL: CPT | Performed by: INTERNAL MEDICINE

## 2021-07-13 PROCEDURE — 84443 ASSAY THYROID STIM HORMONE: CPT

## 2021-07-13 PROCEDURE — 85025 COMPLETE CBC W/AUTO DIFF WBC: CPT | Performed by: INTERNAL MEDICINE

## 2021-07-13 PROCEDURE — 82306 VITAMIN D 25 HYDROXY: CPT | Performed by: INTERNAL MEDICINE

## 2021-07-13 PROCEDURE — 36415 COLL VENOUS BLD VENIPUNCTURE: CPT

## 2021-07-13 PROCEDURE — 82607 VITAMIN B-12: CPT | Performed by: INTERNAL MEDICINE

## 2021-07-15 RX ORDER — SERTRALINE HYDROCHLORIDE 100 MG/1
TABLET, FILM COATED ORAL
Qty: 90 TABLET | Refills: 0 | Status: SHIPPED | OUTPATIENT
Start: 2021-07-15 | End: 2021-10-18

## 2021-07-15 NOTE — TELEPHONE ENCOUNTER
Last Ov: 7/1/20, TB CPE  Last labs: B12 & Folate, CMP, CBC, Ferritin, Iron & TIBC, Lipid, Vit D, TSH+Free T4, Vit B1 7/13/21  Last Rx: sertraline 100mg, #90, 0R 4/20/21    No future appointments.     Per Protocol - not on protocol, pending

## 2021-07-17 LAB — VITAMIN B1 (THIAMINE), WHOLE B: 120 NMOL/L

## 2021-08-26 ENCOUNTER — TELEPHONE (OUTPATIENT)
Dept: INTERNAL MEDICINE CLINIC | Facility: HOSPITAL | Age: 41
End: 2021-08-26

## 2021-08-26 DIAGNOSIS — Z20.822 EXPOSURE TO COVID-19 VIRUS: Primary | ICD-10-CM

## 2021-08-26 NOTE — TELEPHONE ENCOUNTER
Department: labor and delivery                                 [x] [facility]  []SARKIS   [] [address] [place] [address]    Dept Manager/Supervisor/team or clinical lead: raisa bryan    Position:  [] MD     [x] RN     [] Respiratory Therapist     [] PCT     [] Other     HAVE YOU RE 8/27/2021    Did you have close contact with someone on your unit while not wearing a mask? (e.g., during meal breaks):  Yes []   No [x]    If yes, who:   Do you share a workspace? Yes []   No [x]       If yes, with whom?    Do you have any family members s ordered: [] Rapid    [x] [facility]    Date test is to be taken:    8/26/2021    []  Outside testing       [x] Manager notified    INSTRUCTIONS PROVIDED:    [x]Employee was instructed to call Central scheduling at [phone] or use [employer] to make an appoin

## 2021-08-27 ENCOUNTER — LAB ENCOUNTER (OUTPATIENT)
Dept: LAB | Facility: HOSPITAL | Age: 41
End: 2021-08-27
Attending: PREVENTIVE MEDICINE

## 2021-08-27 DIAGNOSIS — Z20.822 EXPOSURE TO COVID-19 VIRUS: ICD-10-CM

## 2021-08-29 LAB — SARS-COV-2 RNA RESP QL NAA+PROBE: NOT DETECTED

## 2021-08-31 ENCOUNTER — TELEPHONE (OUTPATIENT)
Dept: INTERNAL MEDICINE CLINIC | Facility: CLINIC | Age: 41
End: 2021-08-31

## 2021-08-31 NOTE — TELEPHONE ENCOUNTER
Results and RTW guidelines:    COVID RESULT:    [] Viewed by employee in [employer]. RTW plan and instructions as indicated on triage call. Manager notified. Estimated RTW date:   [] Discussed with employee   [x] Unable to reach by phone.   Sent via [employer]

## 2021-08-31 NOTE — TELEPHONE ENCOUNTER
Future Appointments   Date Time Provider Ángela Dwyer   10/22/2021 10:40 AM Yessy Hernandez MD [facility]     Annual Physical   Lab is [facility]  Pt aware to fast and to complete labs no sooner than 2 weeks prior to physical   No call back requi

## 2021-09-20 RX ORDER — METOPROLOL SUCCINATE 25 MG/1
TABLET, EXTENDED RELEASE ORAL
Qty: 90 TABLET | Refills: 0 | Status: SHIPPED | OUTPATIENT
Start: 2021-09-20 | End: 2021-12-14

## 2021-09-20 NOTE — TELEPHONE ENCOUNTER
Requesting   Requested Prescriptions     Pending Prescriptions Disp Refills   • METOPROLOL SUCCINATE 25 MG Oral Tablet 24 Hr [Pharmacy Med Name: METOPROLOL SUCC ER 25 MG TAB] 90 tablet 0     Sig: TAKE 1 TABLET BY MOUTH EVERY DAY     LOV: 6/24/21  RTC: 6 we

## 2021-10-18 RX ORDER — SERTRALINE HYDROCHLORIDE 100 MG/1
TABLET, FILM COATED ORAL
Qty: 90 TABLET | Refills: 0 | Status: SHIPPED | OUTPATIENT
Start: 2021-10-18 | End: 2022-01-13

## 2021-10-18 NOTE — TELEPHONE ENCOUNTER
Been Following  TB  Last OV  7/1/20  Last CPE  7/1/20  Last Labs  TSH, LIPID, CBC, CMP 7/13/21    Last Rx fill     Medication Quantity Refills Start End   SERTRALINE  MG Oral Tab 90 tablet 0 7/15/2021        Future Appointments   Date Time Provider

## 2021-10-22 ENCOUNTER — OFFICE VISIT (OUTPATIENT)
Dept: INTERNAL MEDICINE CLINIC | Facility: CLINIC | Age: 41
End: 2021-10-22
Payer: COMMERCIAL

## 2021-10-22 VITALS
RESPIRATION RATE: 16 BRPM | DIASTOLIC BLOOD PRESSURE: 60 MMHG | WEIGHT: 165 LBS | TEMPERATURE: 97 F | HEIGHT: 68.11 IN | BODY MASS INDEX: 25.01 KG/M2 | OXYGEN SATURATION: 100 % | SYSTOLIC BLOOD PRESSURE: 100 MMHG | HEART RATE: 63 BPM

## 2021-10-22 DIAGNOSIS — M53.3 CHRONIC RIGHT SI JOINT PAIN: ICD-10-CM

## 2021-10-22 DIAGNOSIS — L65.9 HAIR THINNING: ICD-10-CM

## 2021-10-22 DIAGNOSIS — G89.29 CHRONIC RIGHT SI JOINT PAIN: ICD-10-CM

## 2021-10-22 DIAGNOSIS — Z00.00 ROUTINE GENERAL MEDICAL EXAMINATION AT A HEALTH CARE FACILITY: Primary | ICD-10-CM

## 2021-10-22 PROCEDURE — 3008F BODY MASS INDEX DOCD: CPT | Performed by: INTERNAL MEDICINE

## 2021-10-22 PROCEDURE — 99395 PREV VISIT EST AGE 18-39: CPT | Performed by: INTERNAL MEDICINE

## 2021-10-22 PROCEDURE — 3078F DIAST BP <80 MM HG: CPT | Performed by: INTERNAL MEDICINE

## 2021-10-22 PROCEDURE — 3074F SYST BP LT 130 MM HG: CPT | Performed by: INTERNAL MEDICINE

## 2021-10-22 NOTE — PROGRESS NOTES
Patient presents with:  Physical: SN Rm 3      HPI:    Patient is here for CPE   Sees gyne Dr. Leigh Ann Reddy, utd on pap, has IUD that is working well for her  Anxiety is doing well, never used the xanax I gave her last year.  Helps to have it in case  Obesity- on Surgical History:   Procedure Laterality Date   • ANESTH, SECTION  2017   • ORAL SURGERY PROCEDURE      [place] teeth extraction - no complications   • UPPER GI ENDOSCOPY,EXAM      EGD     Family History   Problem Relation Age of Onset   • B 24 HOURS 10 tablet 2   • ALPRAZolam 0.25 MG Oral Tab Take 1 tablet (0.25 mg total) by mouth nightly as needed for Anxiety. 30 tablet 0   • levonorgestrel 20 MCG/24HR Intrauterine IUD 1 each by Intrauterine route once.          Allergies  No Known Allergies iron studies, cbc wnl.  Referred to derm for further eval  Migraines- controlled, consider lower dose of topamax ( 25mg at bedtime) due to memory changes  Chronic right si joint pain- failed on otc nsaids, referred to Dr. Ajay Garcia for possible steroid injection

## 2021-11-06 ENCOUNTER — NURSE ONLY (OUTPATIENT)
Dept: LAB | Facility: HOSPITAL | Age: 41
End: 2021-11-06
Attending: PREVENTIVE MEDICINE
Payer: COMMERCIAL

## 2021-11-06 ENCOUNTER — TELEPHONE (OUTPATIENT)
Dept: INTERNAL MEDICINE CLINIC | Facility: HOSPITAL | Age: 41
End: 2021-11-06

## 2021-11-06 ENCOUNTER — HOSPITAL ENCOUNTER (OUTPATIENT)
Age: 41
Discharge: HOME | End: 2021-11-06
Payer: COMMERCIAL

## 2021-11-06 VITALS
TEMPERATURE: 98 F | HEART RATE: 67 BPM | SYSTOLIC BLOOD PRESSURE: 117 MMHG | DIASTOLIC BLOOD PRESSURE: 64 MMHG | OXYGEN SATURATION: 98 % | RESPIRATION RATE: 16 BRPM

## 2021-11-06 DIAGNOSIS — R09.81 NASAL CONGESTION: Primary | ICD-10-CM

## 2021-11-06 DIAGNOSIS — Z20.822 SUSPECTED COVID-19 VIRUS INFECTION: Primary | ICD-10-CM

## 2021-11-06 DIAGNOSIS — Z20.822 SUSPECTED COVID-19 VIRUS INFECTION: ICD-10-CM

## 2021-11-06 PROCEDURE — 99213 OFFICE O/P EST LOW 20 MIN: CPT | Performed by: NURSE PRACTITIONER

## 2021-11-06 PROCEDURE — U0002 COVID-19 LAB TEST NON-CDC: HCPCS | Performed by: NURSE PRACTITIONER

## 2021-11-06 NOTE — TELEPHONE ENCOUNTER
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 (e.g., during meal breaks):  Yes []   No [x]    If yes, who:   Do you share a workspace? Yes []   No [x]       If yes, with whom? Do you have any family members sick at home?      [x] Yes    [] N ordered: [x] Rapid    [] [facility]    Date test is to be taken:    11/06/21  []  Outside testing       [x] Manager notified    INSTRUCTIONS PROVIDED:    [x] Employee will schedule testing via [employer] or call Central Scheduling at [phone].   Instructed to

## 2021-11-06 NOTE — ED PROVIDER NOTES
Patient Seen in: [facility]      History   Patient presents with:  Runny Nose  Testing    Stated Complaint: runny nose **employee    Subjective:   53-year-old female who presents the IC with runny nose cough and congestion.   Patient was sent h noted in HPI. Constitutional and vital signs reviewed. All other systems reviewed and negative except as noted above.     Physical Exam     ED Triage Vitals [11/06/21 1524]   /64   Pulse 67   Resp 16   Temp 97.8 °F (36.6 °C)   Temp src Temporal

## 2021-11-08 NOTE — TELEPHONE ENCOUNTER
LVM to call hotline back, did not take Rapid test yet - orders were place [facility] on 11/6

## 2021-12-13 NOTE — TELEPHONE ENCOUNTER
Requesting metoprol  LOV: TE 6/24/21  RTC: 6 weeks  Filled: 9/20/21 #90 with 0 refills    Future Appointments   Date Time Provider Ángela Dwyer   12/16/2021  3:20 PM Bailey Estrada MD [address]   1/19/2022 11:20 AM DO Mariangel Nevarez Nab

## 2021-12-14 RX ORDER — METOPROLOL SUCCINATE 25 MG/1
TABLET, EXTENDED RELEASE ORAL
Qty: 90 TABLET | Refills: 0 | Status: SHIPPED | OUTPATIENT
Start: 2021-12-14

## 2021-12-16 ENCOUNTER — TELEMEDICINE (OUTPATIENT)
Dept: INTERNAL MEDICINE CLINIC | Facility: CLINIC | Age: 41
End: 2021-12-16

## 2021-12-16 DIAGNOSIS — Z51.81 THERAPEUTIC DRUG MONITORING: Primary | ICD-10-CM

## 2021-12-16 DIAGNOSIS — R63.5 WEIGHT GAIN: ICD-10-CM

## 2021-12-16 DIAGNOSIS — E66.9 OBESITY (BMI 30-39.9): ICD-10-CM

## 2021-12-16 PROCEDURE — 99213 OFFICE O/P EST LOW 20 MIN: CPT | Performed by: INTERNAL MEDICINE

## 2021-12-16 RX ORDER — SEMAGLUTIDE 1.7 MG/.75ML
1.7 INJECTION, SOLUTION SUBCUTANEOUS WEEKLY
Qty: 3 ML | Refills: 0 | Status: SHIPPED | OUTPATIENT
Start: 2021-12-16 | End: 2021-12-28

## 2021-12-16 NOTE — PROGRESS NOTES
HISTORY OF PRESENT ILLNESS  Patient presents with:  Weight Check: video      Shauna Ar is a 46year old female here for follow up in medical weight loss program.     Denies chest pain, shortness of breath, dizziness, blurred vision, headache, pa CREATSERUM 0.67 07/13/2021    ANIONGAP 3 07/13/2021    GFRNAA 112 07/13/2021    GFRAA 129 07/13/2021    CA 9.3 07/13/2021    OSMOCALC 287 07/13/2021    ALKPHO 65 07/13/2021    AST 14 (L) 07/13/2021    ALT 30 07/13/2021    BILT 1.0 07/13/2021    TP 7.4 07/1 orders  -     semaglutide-weight management (WEGOVY) 1.7 MG/0.75ML Subcutaneous Solution Auto-injector; Inject 0.75 mL (1.7 mg total) into the skin once a week for 4 doses. -     Lisdexamfetamine Dimesylate (VYVANSE) 50 MG Oral Cap;  Take 1 capsule (50 mg understanding. Mary Holm MD    Pt understands phone/video evaluation is not a substitute for face to face examination or emergency care. Pt advised to go to the ER or call 911 for worsening symptoms or acute distress.        Please note that the followi

## 2021-12-17 ENCOUNTER — TELEPHONE (OUTPATIENT)
Dept: INTERNAL MEDICINE CLINIC | Facility: CLINIC | Age: 41
End: 2021-12-17

## 2021-12-17 NOTE — TELEPHONE ENCOUNTER
Note received from [facility] that [facility] JORDON RODRIGUEZ is not covered and need to RX alternative. I called pharmacy to verify no PA could be done and they said it is a straight denial.  How do you want to proceed?

## 2022-01-13 RX ORDER — SERTRALINE HYDROCHLORIDE 100 MG/1
TABLET, FILM COATED ORAL
Qty: 90 TABLET | Refills: 1 | Status: SHIPPED | OUTPATIENT
Start: 2022-01-13

## 2022-01-13 NOTE — TELEPHONE ENCOUNTER
Last VISIT - 10/22/21 Physical    Last CPE -  10/22/21    Last REFILL -     SERTRALINE 100 MG Oral Tab 90 tablet 0 10/18/2021     Last LABS - 7/13/21 TSH, LIPID, CBC      Per PROTOCOL? NONE    Please Approve or Deny.

## 2022-01-19 ENCOUNTER — OFFICE VISIT (OUTPATIENT)
Dept: NEUROLOGY | Facility: CLINIC | Age: 42
End: 2022-01-19
Payer: COMMERCIAL

## 2022-01-19 VITALS
SYSTOLIC BLOOD PRESSURE: 98 MMHG | BODY MASS INDEX: 27 KG/M2 | WEIGHT: 175 LBS | DIASTOLIC BLOOD PRESSURE: 56 MMHG | HEART RATE: 78 BPM

## 2022-01-19 DIAGNOSIS — G43.109 MIGRAINE WITH AURA AND WITHOUT STATUS MIGRAINOSUS, NOT INTRACTABLE: Primary | ICD-10-CM

## 2022-01-19 PROCEDURE — 99213 OFFICE O/P EST LOW 20 MIN: CPT | Performed by: OTHER

## 2022-01-19 PROCEDURE — 3078F DIAST BP <80 MM HG: CPT | Performed by: OTHER

## 2022-01-19 PROCEDURE — 3074F SYST BP LT 130 MM HG: CPT | Performed by: OTHER

## 2022-01-19 NOTE — PROGRESS NOTES
Patient reports that Topamax her migraines have decreased to one every 5-6 months.     Patient reports she does not believe she has ever had a \"good memory\", but reports since beginning the Topamax she has noticed a sharp decline in cognitive functioning

## 2022-01-19 NOTE — PROGRESS NOTES
Neurology H&P    Rhesa Diaz Paliokaitis Patient Status:  No patient class for patient encounter    1980 MRN [id number]   Location [address] Attending No att. providers found   Hosp Day # 0 PCP Jessica Lam MD     Subjective:  Naman Hernandez Reported on 1/19/2022) 30 tablet 0       Problem List:  Patient Active Problem List:     Other abnormal blood chemistry     Migraine headache     Lumbago     Urinary tract infection, site not specified     Urinary frequency     Palpitations     Screening f (Endometriosis) Sister    • Other (Rectal Prolapse) Sister             ROS:  10 point ROS completed and was negative, except for pertinent positive and negatives stated in subjective.     Objective/Physical Exam:    Vital Signs:  Weight 175 lb (79.4 kg), la frequency, but she does report a decline in her cognitive function since starting this. We will stop Topamax at this time as she only has 3-6 migraine in a year and hasnt had one on 6+ months      Plan:  1.  Migraine  - Stop Topamax  - Continue relpax  - W

## 2022-02-02 ENCOUNTER — ORDER TRANSCRIPTION (OUTPATIENT)
Dept: ADMINISTRATIVE | Facility: HOSPITAL | Age: 42
End: 2022-02-02

## 2022-02-15 ENCOUNTER — PATIENT MESSAGE (OUTPATIENT)
Dept: NEUROLOGY | Facility: CLINIC | Age: 42
End: 2022-02-15

## 2022-02-15 RX ORDER — ELETRIPTAN HYDROBROMIDE 40 MG/1
TABLET, FILM COATED ORAL
Qty: 10 TABLET | Refills: 2 | Status: SHIPPED | OUTPATIENT
Start: 2022-02-15 | End: 2022-02-17

## 2022-02-15 NOTE — TELEPHONE ENCOUNTER
From: Bigg Martin  To: Tariq Flores DO  Sent: 2/15/2022 11:04 AM CST  Subject: Migraine meds    Fritzy Dr. Tommy Braun! So since I stopped the Topamax less than a month ago I have gotten 4 migraines. I would like to start on another prophylactic med like we talked about during my office visit, something other than Topamax and the cognitive side effects. You also mentioned you would send in a refill for my Relpax but it's not been refilled, wondering if you could do this for me? Thank you so much!

## 2022-02-15 NOTE — TELEPHONE ENCOUNTER
Routed to Dr. La Mascorro for recommendations; Relpax refill pended (see notes below)    Medication: relpax/eletriptan     Date of last refill: 12/23/20 (Dr. Edgar Simpson)  [place]     Last office visit: 1/19/22  Due back to clinic per last office note:  2 months  Date next office visit scheduled:    Future Appointments   Date Time Provider Ángela Dwyer   2/18/2022 11:40 AM REED [facility] Daril Pat Timothy Bosworth   5/2/2022  1:35 PM Fred Alvarez,  ENINAPER [facility] [place]           Last OV note recommendation:    Per Dr. La Mascorro:  Plan:  1.  Migraine  - Stop Topamax  - Continue relpax  - We did discusss that she has an estrogen eluding IUD and needs to discuss this with her OB/GYNE as she has migraines with a visual aura as well

## 2022-02-16 ENCOUNTER — PATIENT MESSAGE (OUTPATIENT)
Dept: NEUROLOGY | Facility: CLINIC | Age: 42
End: 2022-02-16

## 2022-02-17 RX ORDER — ELETRIPTAN HYDROBROMIDE 40 MG/1
TABLET, FILM COATED ORAL
Qty: 10 TABLET | Refills: 2 | Status: SHIPPED | OUTPATIENT
Start: 2022-02-17 | End: 2022-02-17

## 2022-02-17 RX ORDER — ELETRIPTAN HYDROBROMIDE 40 MG/1
TABLET, FILM COATED ORAL
Qty: 10 TABLET | Refills: 2 | Status: SHIPPED | OUTPATIENT
Start: 2022-02-17

## 2022-02-17 RX ORDER — ZONISAMIDE 100 MG/1
100 CAPSULE ORAL DAILY
Qty: 30 CAPSULE | Refills: 1 | Status: SHIPPED | OUTPATIENT
Start: 2022-02-17

## 2022-02-17 NOTE — TELEPHONE ENCOUNTER
Patient sent [employer] message requesting alternate prophylactic. LOV on 1/19/2022-     Plan:  1.  Migraine  - Stop Topamax  - Continue relpax  - We did discusss that she has an estrogen eluding IUD and needs to discuss this with her OB/GYNE as she has migraines with a visual aura as well

## 2022-02-18 ENCOUNTER — HOSPITAL ENCOUNTER (OUTPATIENT)
Dept: MAMMOGRAPHY | Age: 42
Discharge: HOME | End: 2022-02-18
Attending: INTERNAL MEDICINE
Payer: COMMERCIAL

## 2022-02-18 DIAGNOSIS — Z12.31 ENCOUNTER FOR SCREENING MAMMOGRAM FOR MALIGNANT NEOPLASM OF BREAST: ICD-10-CM

## 2022-02-18 PROCEDURE — 77067 SCR MAMMO BI INCL CAD: CPT | Performed by: OBSTETRICS & GYNECOLOGY

## 2022-02-27 ENCOUNTER — PATIENT MESSAGE (OUTPATIENT)
Dept: NEUROLOGY | Facility: CLINIC | Age: 42
End: 2022-02-27

## 2022-02-28 ENCOUNTER — PATIENT MESSAGE (OUTPATIENT)
Dept: NEUROLOGY | Facility: CLINIC | Age: 42
End: 2022-02-28

## 2022-02-28 RX ORDER — PROPRANOLOL HCL 60 MG
60 CAPSULE, EXTENDED RELEASE 24HR ORAL DAILY
Qty: 30 CAPSULE | Refills: 0 | Status: SHIPPED | OUTPATIENT
Start: 2022-02-28 | End: 2022-03-04

## 2022-03-15 RX ORDER — METOPROLOL SUCCINATE 25 MG/1
TABLET, EXTENDED RELEASE ORAL
Qty: 90 TABLET | Refills: 0 | Status: SHIPPED | OUTPATIENT
Start: 2022-03-15

## 2022-03-16 RX ORDER — ZONISAMIDE 100 MG/1
CAPSULE ORAL
Qty: 30 CAPSULE | Refills: 1 | OUTPATIENT
Start: 2022-03-16

## 2022-03-17 ENCOUNTER — PATIENT MESSAGE (OUTPATIENT)
Dept: INTERNAL MEDICINE CLINIC | Facility: CLINIC | Age: 42
End: 2022-03-17

## 2022-03-17 RX ORDER — SEMAGLUTIDE 2.4 MG/.75ML
2.4 INJECTION, SOLUTION SUBCUTANEOUS WEEKLY
Qty: 3 ML | Refills: 0 | Status: SHIPPED | OUTPATIENT
Start: 2022-03-17 | End: 2022-04-08

## 2022-03-17 NOTE — TELEPHONE ENCOUNTER
Requesting Wegovy increase  LOV: 12/16/21  RTC: 8 weeks  Last Relevant Labs: na  Filled: 1/14/22 #3ml with 0 refills  wegovy 1.7 mg    I sent my chart to patient to schedule asap

## 2022-03-17 NOTE — TELEPHONE ENCOUNTER
From: Juan David Martin  To: Prakash Gomez MD  Sent: 3/17/2022 9:48 AM CDT  Subject: Merle Quijano! Just reaching out to get your input, I've been doing the [facility] for almost 8 weeks now, don't feel any different, not down any. I'll need a refill on it after next week so before the pharmacy sends it to you I'm wondering if this is a med we can go higher on? Thanks so much! Miss you!     Belia Downey

## 2022-03-30 ENCOUNTER — TELEPHONE (OUTPATIENT)
Dept: NEUROLOGY | Facility: CLINIC | Age: 42
End: 2022-03-30

## 2022-03-30 NOTE — TELEPHONE ENCOUNTER
Patient received denial of Lysle Neighbor from insurance company. RN discussed with Karen Lopez, and he states that as long as Augusto Luna is going through [facility], the patient can continue to receive medication despite the denial.    Patient informed of above and she still would like PA completed. PA completed and faxed to [employer] with fax confirmation. Will await decision.

## 2022-04-01 NOTE — TELEPHONE ENCOUNTER
Received Orpha Cava denial stating patient needs to try and fail formulary alternatives: Aimovig, Emgality and Nurtec. [employer] message sent to patient informing her of denial and that she can continue getting Orpha Cava through [address]

## 2022-04-25 RX ORDER — SEMAGLUTIDE 2.4 MG/.75ML
2.4 INJECTION, SOLUTION SUBCUTANEOUS WEEKLY
Qty: 4 EACH | Refills: 0 | Status: SHIPPED | OUTPATIENT
Start: 2022-04-25 | End: 2022-05-17

## 2022-05-02 ENCOUNTER — OFFICE VISIT (OUTPATIENT)
Dept: NEUROLOGY | Facility: CLINIC | Age: 42
End: 2022-05-02
Payer: COMMERCIAL

## 2022-05-02 VITALS
SYSTOLIC BLOOD PRESSURE: 122 MMHG | RESPIRATION RATE: 16 BRPM | HEART RATE: 72 BPM | WEIGHT: 168 LBS | DIASTOLIC BLOOD PRESSURE: 60 MMHG | BODY MASS INDEX: 25 KG/M2

## 2022-05-02 DIAGNOSIS — G43.009 MIGRAINE WITHOUT AURA AND WITHOUT STATUS MIGRAINOSUS, NOT INTRACTABLE: Primary | ICD-10-CM

## 2022-05-02 RX ORDER — LISDEXAMFETAMINE DIMESYLATE 50 MG
50 CAPSULE ORAL DAILY
COMMUNITY
Start: 2022-02-15

## 2022-05-02 RX ORDER — ATOGEPANT 60 MG/1
TABLET ORAL
Qty: 30 TABLET | Refills: 2 | Status: SHIPPED | OUTPATIENT
Start: 2022-05-02

## 2022-05-31 ENCOUNTER — OFFICE VISIT (OUTPATIENT)
Dept: INTERNAL MEDICINE CLINIC | Facility: CLINIC | Age: 42
End: 2022-05-31
Payer: COMMERCIAL

## 2022-05-31 VITALS
TEMPERATURE: 97 F | SYSTOLIC BLOOD PRESSURE: 118 MMHG | RESPIRATION RATE: 16 BRPM | HEART RATE: 75 BPM | DIASTOLIC BLOOD PRESSURE: 70 MMHG | BODY MASS INDEX: 24.86 KG/M2 | WEIGHT: 164 LBS | OXYGEN SATURATION: 98 % | HEIGHT: 68 IN

## 2022-05-31 DIAGNOSIS — R63.5 WEIGHT GAIN: ICD-10-CM

## 2022-05-31 DIAGNOSIS — Z51.81 THERAPEUTIC DRUG MONITORING: Primary | ICD-10-CM

## 2022-05-31 DIAGNOSIS — E66.9 OBESITY (BMI 30-39.9): ICD-10-CM

## 2022-05-31 PROCEDURE — 99214 OFFICE O/P EST MOD 30 MIN: CPT | Performed by: INTERNAL MEDICINE

## 2022-05-31 PROCEDURE — 3074F SYST BP LT 130 MM HG: CPT | Performed by: INTERNAL MEDICINE

## 2022-05-31 PROCEDURE — 3008F BODY MASS INDEX DOCD: CPT | Performed by: INTERNAL MEDICINE

## 2022-05-31 PROCEDURE — 3078F DIAST BP <80 MM HG: CPT | Performed by: INTERNAL MEDICINE

## 2022-05-31 RX ORDER — CARVEDILOL 3.12 MG/1
3.12 TABLET ORAL 2 TIMES DAILY WITH MEALS
Qty: 180 TABLET | Refills: 1 | Status: SHIPPED | OUTPATIENT
Start: 2022-05-31

## 2022-05-31 RX ORDER — SEMAGLUTIDE 2.4 MG/.75ML
2.4 INJECTION, SOLUTION SUBCUTANEOUS WEEKLY
Qty: 3 ML | Refills: 1 | Status: SHIPPED | OUTPATIENT
Start: 2022-05-31 | End: 2022-06-22

## 2022-06-15 RX ORDER — METOPROLOL SUCCINATE 25 MG/1
TABLET, EXTENDED RELEASE ORAL
Qty: 90 TABLET | Refills: 0 | OUTPATIENT
Start: 2022-06-15

## 2022-07-02 ENCOUNTER — TELEPHONE (OUTPATIENT)
Dept: INTERNAL MEDICINE CLINIC | Facility: HOSPITAL | Age: 42
End: 2022-07-02

## 2022-07-02 DIAGNOSIS — Z20.822 SUSPECTED 2019 NOVEL CORONAVIRUS INFECTION: Primary | ICD-10-CM

## 2022-07-03 ENCOUNTER — LAB ENCOUNTER (OUTPATIENT)
Dept: LAB | Age: 42
End: 2022-07-03
Attending: PREVENTIVE MEDICINE
Payer: COMMERCIAL

## 2022-07-03 DIAGNOSIS — Z20.822 SUSPECTED 2019 NOVEL CORONAVIRUS INFECTION: ICD-10-CM

## 2022-07-03 LAB — SARS-COV-2 RNA RESP QL NAA+PROBE: DETECTED

## 2022-07-04 NOTE — TELEPHONE ENCOUNTER
Results and RTW guidelines:    COVID RESULT:    [] Viewed by employee in [address] RTW plan and instructions as indicated on triage call. Manager notified. Estimated RTW date:   [] Discussed with employee   [x] Unable to reach by phone. Sent via [employer] message      Test type:    [x] Rapid         [] Alinity         [] Outside test:       [x] Positive  Is employee unvaccinated? Yes []   No [x]          If yes:  Enter Covid Positive Medical exemption on Exemption Spreadsheet    Did you have close contact with someone on your unit while not wearing a mask? (e.g., during meal breaks):  Yes []   No []     If yes, who:     - Employee should quarantine at home for at least 5 days (day 1 is day after sx onset) , follow the CDC guidelines for cleaning and                              quarantining; see CDC.gov   -This employee may RTW on day 6 if asymptomatic or mildly symptomatic (with improving symptoms). Call Employee Health on day 5 if unable to return on day 6 after                      symptom onset.    -This employee needs to call Employee Health on day 5 after symptom onset. The employee needs to be cleared by [employer]. - Monitor symptoms and temperature                 - Notify PCP of result                 - Seek emergent care with worsening symptoms   - If employee is still experiencing severe symptoms on day 5 must make a RTW appt with [employer], Employee will not be cleared if:    1. Has consistent cough, shortness of breath or fatigue that restricts your physical activities    2. Is still feeling \"unwell\"    3. Within 15 days of hospitalization for COVID    4. Within 20 days of intubation for COVID    5.  Still has a fever, vomiting or diarrhea   - Keep communication open with management about RTW and if symptoms worsen                - If outside testing completed, bring a copy of result to RTW appointment           Notes:     RTW PLAN:    [x]  If COVID positive results, off work minimum of 5 days from positive test or onset of symptoms (day 0)        On day 5, if asymptomatic or mildly symptomatic (with improving symptoms) may return to work day 6          On day 5, if symptomatic, call Employee Health for RTW screening        [x]  COVID positive result - call Employee Health on day 5 after symptom onset. The employee needs to be cleared by Employee Health to RTW. [] RTW immediately, continue to monitor for sx  [] RTW when sx improve; must be fever free for 24 hours w/o medications, Diarrhea/Vomiting free for 24 hours w/o medications  [] Alinity ordered; continue to monitor sx and call for new/worsening sx.   Discuss RTW guidelines with manager  [] May continue to work  [x] Follow up with PCP  [] Home until further instruction from hotline with Alinity results  INSTRUCTIONS PROVIDED:  [x]  Plan as noted above  []  Length of time to obtain results   [x]  Quarantine instructions  [x]  Masking protocol   []  S/S of worsening infection/condition and importance of prompt medical re-evaluation including when to seek emergency care  [] If symptoms develop, stay home and call hotline for rapid test order    Estimated RTW date:  07/08    [] The employee voiced understanding of above plan/instructions  [x] Manager Notified

## 2022-07-22 NOTE — TELEPHONE ENCOUNTER
Last VISIT - 10/22/21 Physical    Last CPE - 10/22/21    Last REFILL -     SERTRALINE 100 MG Oral Tab 90 tablet 1 1/13/2022     Last LABS - 7/13/21 tsh, lipid, cbc, cmp    Per PROTOCOL? None    Please Approve or Deny.

## 2022-07-24 RX ORDER — SERTRALINE HYDROCHLORIDE 100 MG/1
TABLET, FILM COATED ORAL
Qty: 90 TABLET | Refills: 1 | Status: SHIPPED | OUTPATIENT
Start: 2022-07-24

## 2022-07-25 DIAGNOSIS — G43.109 MIGRAINE WITH AURA AND WITHOUT STATUS MIGRAINOSUS, NOT INTRACTABLE: ICD-10-CM

## 2022-07-25 RX ORDER — METOPROLOL SUCCINATE 25 MG/1
TABLET, EXTENDED RELEASE ORAL
Qty: 90 TABLET | Refills: 0 | OUTPATIENT
Start: 2022-07-25

## 2022-07-25 NOTE — TELEPHONE ENCOUNTER
Medication: QULIPTA 60 MG      Date of last refill: 5/2/22 (#30/2)  [place]      Last office visit: 5/2/2022  Due back to clinic per last office note:  4 months  Date next office visit scheduled:    Future Appointments   Date Time Provider Ángela Dwyer   8/23/2022 12:20 PM Shalonda Brito MD [address]   9/28/2022  1:00 PM DO AMANDEEP Bryant  [address] note recommendation:    1. Migraine  - Continue Qulipta 60  - Continue relpax for breakthrough      Return in about 4 months (around 9/2/2022).

## 2022-07-27 RX ORDER — ATOGEPANT 60 MG/1
TABLET ORAL
Qty: 30 TABLET | Refills: 2 | Status: SHIPPED | OUTPATIENT
Start: 2022-07-27

## 2022-08-23 ENCOUNTER — OFFICE VISIT (OUTPATIENT)
Dept: INTERNAL MEDICINE CLINIC | Facility: CLINIC | Age: 42
End: 2022-08-23
Payer: COMMERCIAL

## 2022-08-23 VITALS
RESPIRATION RATE: 16 BRPM | BODY MASS INDEX: 24.4 KG/M2 | HEIGHT: 68 IN | WEIGHT: 161 LBS | DIASTOLIC BLOOD PRESSURE: 78 MMHG | HEART RATE: 64 BPM | SYSTOLIC BLOOD PRESSURE: 118 MMHG

## 2022-08-23 DIAGNOSIS — I49.3 PVC (PREMATURE VENTRICULAR CONTRACTION): ICD-10-CM

## 2022-08-23 DIAGNOSIS — Z51.81 THERAPEUTIC DRUG MONITORING: Primary | ICD-10-CM

## 2022-08-23 DIAGNOSIS — E66.9 OBESITY (BMI 30-39.9): ICD-10-CM

## 2022-08-23 DIAGNOSIS — H60.501 ACUTE OTITIS EXTERNA OF RIGHT EAR, UNSPECIFIED TYPE: ICD-10-CM

## 2022-08-23 PROCEDURE — 3078F DIAST BP <80 MM HG: CPT | Performed by: INTERNAL MEDICINE

## 2022-08-23 PROCEDURE — 3074F SYST BP LT 130 MM HG: CPT | Performed by: INTERNAL MEDICINE

## 2022-08-23 PROCEDURE — 3008F BODY MASS INDEX DOCD: CPT | Performed by: INTERNAL MEDICINE

## 2022-08-23 PROCEDURE — 99214 OFFICE O/P EST MOD 30 MIN: CPT | Performed by: INTERNAL MEDICINE

## 2022-08-23 RX ORDER — SEMAGLUTIDE 2.4 MG/.75ML
2.4 INJECTION, SOLUTION SUBCUTANEOUS
COMMUNITY
Start: 2022-07-25 | End: 2022-08-23

## 2022-08-23 RX ORDER — SEMAGLUTIDE 2.4 MG/.75ML
2.4 INJECTION, SOLUTION SUBCUTANEOUS WEEKLY
Qty: 4 EACH | Refills: 0 | Status: SHIPPED | OUTPATIENT
Start: 2022-08-23

## 2022-08-23 RX ORDER — CIPROFLOXACIN AND DEXAMETHASONE 3; 1 MG/ML; MG/ML
4 SUSPENSION/ DROPS AURICULAR (OTIC) 2 TIMES DAILY
Qty: 7.5 ML | Refills: 1 | Status: SHIPPED | OUTPATIENT
Start: 2022-08-23

## 2022-09-08 ENCOUNTER — TELEPHONE (OUTPATIENT)
Dept: NEUROLOGY | Facility: CLINIC | Age: 42
End: 2022-09-08

## 2022-09-08 NOTE — TELEPHONE ENCOUNTER
Pooja with [employer] called to check status of PA faxed 9/7/22; pls call with any questions [phone]

## 2022-09-09 NOTE — TELEPHONE ENCOUNTER
PA completed for [facility] and faxed to [facility] with confirmation.

## 2022-09-12 NOTE — TELEPHONE ENCOUNTER
Received fax approval from [address] [place] [address] for Quipta 60 mg from 9/12/2022 to 9/12/203. Faxed approval to [facility] and copy placed in approval RN file.

## 2022-09-28 ENCOUNTER — TELEPHONE (OUTPATIENT)
Dept: NEUROLOGY | Facility: CLINIC | Age: 42
End: 2022-09-28

## 2022-09-28 ENCOUNTER — OFFICE VISIT (OUTPATIENT)
Dept: NEUROLOGY | Facility: CLINIC | Age: 42
End: 2022-09-28

## 2022-09-28 VITALS
DIASTOLIC BLOOD PRESSURE: 56 MMHG | RESPIRATION RATE: 16 BRPM | SYSTOLIC BLOOD PRESSURE: 102 MMHG | BODY MASS INDEX: 24 KG/M2 | WEIGHT: 157 LBS | HEART RATE: 86 BPM

## 2022-09-28 DIAGNOSIS — G43.009 MIGRAINE WITHOUT AURA AND WITHOUT STATUS MIGRAINOSUS, NOT INTRACTABLE: Primary | ICD-10-CM

## 2022-09-28 DIAGNOSIS — G43.109 MIGRAINE WITH AURA AND WITHOUT STATUS MIGRAINOSUS, NOT INTRACTABLE: ICD-10-CM

## 2022-09-28 PROCEDURE — 3078F DIAST BP <80 MM HG: CPT | Performed by: OTHER

## 2022-09-28 PROCEDURE — 99213 OFFICE O/P EST LOW 20 MIN: CPT | Performed by: OTHER

## 2022-09-28 PROCEDURE — 3074F SYST BP LT 130 MM HG: CPT | Performed by: OTHER

## 2022-09-28 RX ORDER — ATOGEPANT 60 MG/1
1 TABLET ORAL DAILY
Qty: 60 TABLET | Refills: 5 | Status: SHIPPED | OUTPATIENT
Start: 2022-09-28

## 2022-09-28 RX ORDER — ONDANSETRON 4 MG/1
4 TABLET, ORALLY DISINTEGRATING ORAL EVERY 8 HOURS PRN
Qty: 20 TABLET | Refills: 0 | Status: SHIPPED | OUTPATIENT
Start: 2022-09-28

## 2022-09-28 RX ORDER — ATOGEPANT 60 MG/1
1 TABLET ORAL DAILY
Qty: 60 TABLET | Refills: 5 | Status: SHIPPED | OUTPATIENT
Start: 2022-09-28 | End: 2022-09-28

## 2022-09-28 RX ORDER — ELETRIPTAN HYDROBROMIDE 40 MG/1
TABLET, FILM COATED ORAL
Qty: 10 TABLET | Refills: 2 | Status: SHIPPED | OUTPATIENT
Start: 2022-09-28

## 2022-09-28 NOTE — PROGRESS NOTES
Patient reports that the Colette Hernandez has been working well. She reports 1 migraine in the last month.

## 2022-09-28 NOTE — TELEPHONE ENCOUNTER
Patient brought intermittent FMLA to OV to be completed. Patient informed that paperwork will be uploaded to [employer] once completed.  NICOLE

## 2022-10-05 NOTE — TELEPHONE ENCOUNTER
Paperwork competed and signed by provider. Uploaded to [employer] via [address] and sent to patient. No need to scan as uploaded. No fee as paperwork was brought to [address]

## 2022-10-13 DIAGNOSIS — G43.109 MIGRAINE WITH AURA AND WITHOUT STATUS MIGRAINOSUS, NOT INTRACTABLE: ICD-10-CM

## 2022-10-14 RX ORDER — ATOGEPANT 60 MG/1
TABLET ORAL
Qty: 30 TABLET | Refills: 2 | OUTPATIENT
Start: 2022-10-14

## 2022-11-04 ENCOUNTER — IMMUNIZATION (OUTPATIENT)
Dept: LAB | Facility: HOSPITAL | Age: 42
End: 2022-11-04
Attending: PREVENTIVE MEDICINE
Payer: COMMERCIAL

## 2022-11-04 DIAGNOSIS — Z23 NEED FOR VACCINATION: Primary | ICD-10-CM

## 2022-11-04 PROCEDURE — 0124A SARSCOV2 VAC BVL 30MCG/0.3ML: CPT

## 2022-11-04 PROCEDURE — 90471 IMMUNIZATION ADMIN: CPT

## 2022-11-14 DIAGNOSIS — G43.109 MIGRAINE WITH AURA AND WITHOUT STATUS MIGRAINOSUS, NOT INTRACTABLE: ICD-10-CM

## 2022-11-15 RX ORDER — ATOGEPANT 60 MG/1
TABLET ORAL
Qty: 30 TABLET | Refills: 2 | OUTPATIENT
Start: 2022-11-15

## 2022-11-17 ENCOUNTER — TELEPHONE (OUTPATIENT)
Dept: INTERNAL MEDICINE CLINIC | Facility: CLINIC | Age: 42
End: 2022-11-17

## 2022-11-17 DIAGNOSIS — Z13.29 SCREENING FOR THYROID DISORDER: ICD-10-CM

## 2022-11-17 DIAGNOSIS — Z13.228 SCREENING FOR METABOLIC DISORDER: ICD-10-CM

## 2022-11-17 DIAGNOSIS — Z13.220 SCREENING FOR LIPID DISORDERS: ICD-10-CM

## 2022-11-17 DIAGNOSIS — Z00.00 ROUTINE GENERAL MEDICAL EXAMINATION AT A HEALTH CARE FACILITY: Primary | ICD-10-CM

## 2022-11-17 DIAGNOSIS — Z13.0 SCREENING FOR BLOOD DISEASE: ICD-10-CM

## 2022-11-17 NOTE — TELEPHONE ENCOUNTER
Future Appointments   Date Time Provider Ángela Yuliya   1/31/2023 11:40 AM Teddy Montalvo MD [facility] [address] [facility]     Informed must fast no call back required.  Orders to [facility]

## 2023-01-09 RX ORDER — CARVEDILOL 3.12 MG/1
TABLET ORAL
Qty: 180 TABLET | Refills: 1 | Status: SHIPPED | OUTPATIENT
Start: 2023-01-09

## 2023-01-23 ENCOUNTER — LAB ENCOUNTER (OUTPATIENT)
Dept: LAB | Age: 43
End: 2023-01-23
Attending: INTERNAL MEDICINE
Payer: COMMERCIAL

## 2023-01-23 DIAGNOSIS — Z00.00 LABORATORY EXAM ORDERED AS PART OF ROUTINE GENERAL MEDICAL EXAMINATION: ICD-10-CM

## 2023-01-23 DIAGNOSIS — Z13.220 SCREENING FOR LIPID DISORDERS: ICD-10-CM

## 2023-01-23 DIAGNOSIS — Z13.228 SCREENING FOR ENDOCRINE, NUTRITIONAL, METABOLIC AND IMMUNITY DISORDER: ICD-10-CM

## 2023-01-23 DIAGNOSIS — Z13.29 SCREENING FOR ENDOCRINE, NUTRITIONAL, METABOLIC AND IMMUNITY DISORDER: ICD-10-CM

## 2023-01-23 DIAGNOSIS — Z13.29 SCREENING FOR THYROID DISORDER: ICD-10-CM

## 2023-01-23 DIAGNOSIS — Z13.1 SCREENING FOR DIABETES MELLITUS (DM): ICD-10-CM

## 2023-01-23 DIAGNOSIS — Z13.1 SCREENING FOR DIABETES MELLITUS (DM): Primary | ICD-10-CM

## 2023-01-23 DIAGNOSIS — Z13.21 SCREENING FOR ENDOCRINE, NUTRITIONAL, METABOLIC AND IMMUNITY DISORDER: ICD-10-CM

## 2023-01-23 DIAGNOSIS — Z13.0 SCREENING FOR ENDOCRINE, NUTRITIONAL, METABOLIC AND IMMUNITY DISORDER: ICD-10-CM

## 2023-01-23 LAB
ALBUMIN SERPL-MCNC: 3.7 G/DL (ref 3.4–5)
ALBUMIN/GLOB SERPL: 1.1 {RATIO} (ref 1–2)
ALP LIVER SERPL-CCNC: 69 U/L
ALT SERPL-CCNC: 24 U/L
ANION GAP SERPL CALC-SCNC: 4 MMOL/L (ref 0–18)
AST SERPL-CCNC: 21 U/L (ref 15–37)
BASOPHILS # BLD AUTO: 0.01 X10(3) UL (ref 0–0.2)
BASOPHILS NFR BLD AUTO: 0.3 %
BILIRUB SERPL-MCNC: 0.9 MG/DL (ref 0.1–2)
BUN BLD-MCNC: 9 MG/DL (ref 7–18)
CALCIUM BLD-MCNC: 8.9 MG/DL (ref 8.5–10.1)
CHLORIDE SERPL-SCNC: 107 MMOL/L (ref 98–112)
CHOLEST SERPL-MCNC: 159 MG/DL (ref ?–200)
CO2 SERPL-SCNC: 28 MMOL/L (ref 21–32)
CREAT BLD-MCNC: 0.64 MG/DL
EOSINOPHIL # BLD AUTO: 0.06 X10(3) UL (ref 0–0.7)
EOSINOPHIL NFR BLD AUTO: 1.6 %
ERYTHROCYTE [DISTWIDTH] IN BLOOD BY AUTOMATED COUNT: 12.4 %
FASTING PATIENT LIPID ANSWER: YES
FASTING STATUS PATIENT QL REPORTED: YES
GFR SERPLBLD BASED ON 1.73 SQ M-ARVRAT: 115 ML/MIN/1.73M2 (ref 60–?)
GLOBULIN PLAS-MCNC: 3.4 G/DL (ref 2.8–4.4)
GLUCOSE BLD-MCNC: 105 MG/DL (ref 70–99)
HCT VFR BLD AUTO: 40.4 %
HDLC SERPL-MCNC: 67 MG/DL (ref 40–59)
HGB BLD-MCNC: 14 G/DL
IMM GRANULOCYTES # BLD AUTO: 0.01 X10(3) UL (ref 0–1)
IMM GRANULOCYTES NFR BLD: 0.3 %
LDLC SERPL CALC-MCNC: 78 MG/DL (ref ?–100)
LYMPHOCYTES # BLD AUTO: 1.03 X10(3) UL (ref 1–4)
LYMPHOCYTES NFR BLD AUTO: 27.9 %
MCH RBC QN AUTO: 32.8 PG (ref 26–34)
MCHC RBC AUTO-ENTMCNC: 34.7 G/DL (ref 31–37)
MCV RBC AUTO: 94.6 FL
MONOCYTES # BLD AUTO: 0.53 X10(3) UL (ref 0.1–1)
MONOCYTES NFR BLD AUTO: 14.4 %
NEUTROPHILS # BLD AUTO: 2.05 X10 (3) UL (ref 1.5–7.7)
NEUTROPHILS # BLD AUTO: 2.05 X10(3) UL (ref 1.5–7.7)
NEUTROPHILS NFR BLD AUTO: 55.5 %
NONHDLC SERPL-MCNC: 92 MG/DL (ref ?–130)
OSMOLALITY SERPL CALC.SUM OF ELEC: 287 MOSM/KG (ref 275–295)
PLATELET # BLD AUTO: 204 10(3)UL (ref 150–450)
POTASSIUM SERPL-SCNC: 4.1 MMOL/L (ref 3.5–5.1)
PROT SERPL-MCNC: 7.1 G/DL (ref 6.4–8.2)
RBC # BLD AUTO: 4.27 X10(6)UL
SODIUM SERPL-SCNC: 139 MMOL/L (ref 136–145)
TRIGL SERPL-MCNC: 75 MG/DL (ref 30–149)
TSI SER-ACNC: 1.65 MIU/ML (ref 0.36–3.74)
VLDLC SERPL CALC-MCNC: 12 MG/DL (ref 0–30)
WBC # BLD AUTO: 3.7 X10(3) UL (ref 4–11)

## 2023-01-23 PROCEDURE — 84443 ASSAY THYROID STIM HORMONE: CPT | Performed by: INTERNAL MEDICINE

## 2023-01-23 PROCEDURE — 36415 COLL VENOUS BLD VENIPUNCTURE: CPT | Performed by: INTERNAL MEDICINE

## 2023-01-23 PROCEDURE — 80053 COMPREHEN METABOLIC PANEL: CPT | Performed by: INTERNAL MEDICINE

## 2023-01-23 PROCEDURE — 85025 COMPLETE CBC W/AUTO DIFF WBC: CPT | Performed by: INTERNAL MEDICINE

## 2023-01-23 PROCEDURE — 80061 LIPID PANEL: CPT | Performed by: INTERNAL MEDICINE

## 2023-01-24 ENCOUNTER — OFFICE VISIT (OUTPATIENT)
Dept: INTERNAL MEDICINE CLINIC | Facility: CLINIC | Age: 43
End: 2023-01-24
Payer: COMMERCIAL

## 2023-01-24 VITALS
DIASTOLIC BLOOD PRESSURE: 64 MMHG | OXYGEN SATURATION: 96 % | SYSTOLIC BLOOD PRESSURE: 106 MMHG | HEART RATE: 78 BPM | BODY MASS INDEX: 25.61 KG/M2 | HEIGHT: 68 IN | RESPIRATION RATE: 18 BRPM | WEIGHT: 169 LBS

## 2023-01-24 DIAGNOSIS — R00.2 PALPITATIONS: ICD-10-CM

## 2023-01-24 DIAGNOSIS — D72.818 OTHER DECREASED WHITE BLOOD CELL (WBC) COUNT: ICD-10-CM

## 2023-01-24 DIAGNOSIS — F41.9 ANXIETY: ICD-10-CM

## 2023-01-24 DIAGNOSIS — Z12.83 SCREENING FOR SKIN CANCER: ICD-10-CM

## 2023-01-24 DIAGNOSIS — Z12.31 ENCOUNTER FOR SCREENING MAMMOGRAM FOR MALIGNANT NEOPLASM OF BREAST: ICD-10-CM

## 2023-01-24 DIAGNOSIS — Z00.00 ROUTINE GENERAL MEDICAL EXAMINATION AT A HEALTH CARE FACILITY: Primary | ICD-10-CM

## 2023-01-24 DIAGNOSIS — R41.3 MEMORY CHANGE: ICD-10-CM

## 2023-01-24 DIAGNOSIS — R73.01 ELEVATED FASTING GLUCOSE: ICD-10-CM

## 2023-01-24 DIAGNOSIS — L72.9 SKIN CYST: ICD-10-CM

## 2023-01-24 PROCEDURE — 3008F BODY MASS INDEX DOCD: CPT | Performed by: INTERNAL MEDICINE

## 2023-01-24 PROCEDURE — 99396 PREV VISIT EST AGE 40-64: CPT | Performed by: INTERNAL MEDICINE

## 2023-01-24 PROCEDURE — 3078F DIAST BP <80 MM HG: CPT | Performed by: INTERNAL MEDICINE

## 2023-01-24 PROCEDURE — 3074F SYST BP LT 130 MM HG: CPT | Performed by: INTERNAL MEDICINE

## 2023-01-24 RX ORDER — SERTRALINE HYDROCHLORIDE 100 MG/1
100 TABLET, FILM COATED ORAL DAILY
Qty: 90 TABLET | Refills: 3 | Status: SHIPPED | OUTPATIENT
Start: 2023-01-24

## 2023-01-24 RX ORDER — ALPRAZOLAM 0.25 MG/1
0.25 TABLET ORAL NIGHTLY PRN
Qty: 10 TABLET | Refills: 0 | Status: SHIPPED | OUTPATIENT
Start: 2023-01-24

## 2023-02-01 ENCOUNTER — PATIENT MESSAGE (OUTPATIENT)
Dept: INTERNAL MEDICINE CLINIC | Facility: CLINIC | Age: 43
End: 2023-02-01

## 2023-02-02 RX ORDER — SEMAGLUTIDE 2.4 MG/.75ML
2.4 INJECTION, SOLUTION SUBCUTANEOUS WEEKLY
Qty: 4 EACH | Refills: 0 | OUTPATIENT
Start: 2023-02-02

## 2023-02-02 NOTE — TELEPHONE ENCOUNTER
Requesting Wegovy 2.4  LOV: 8/23/22  RTC: not noted  Last Relevant Labs: na  Filled: 8/23/22 #4 with 0 refills    Future Appointments   Date Time Provider Ángela Dwyer   2/22/2023 10:00 AM Cait Flores MD [facility]

## 2023-02-02 NOTE — TELEPHONE ENCOUNTER
From: Olga Martin  To: Roger Keen MD  Sent: 2/1/2023 6:21 PM CST  Subject: [facility] there! I sent a request earlier today for a refill for my Wegovy but I put the wrong pharmacy down! [facility] so [facility] be [facility] [address] [facility] [phone]    Thank you so much!

## 2023-02-22 ENCOUNTER — OFFICE VISIT (OUTPATIENT)
Dept: INTERNAL MEDICINE CLINIC | Facility: CLINIC | Age: 43
End: 2023-02-22
Payer: COMMERCIAL

## 2023-02-22 VITALS
RESPIRATION RATE: 16 BRPM | HEIGHT: 68 IN | BODY MASS INDEX: 25.16 KG/M2 | SYSTOLIC BLOOD PRESSURE: 110 MMHG | WEIGHT: 166 LBS | OXYGEN SATURATION: 99 % | HEART RATE: 84 BPM | DIASTOLIC BLOOD PRESSURE: 70 MMHG

## 2023-02-22 DIAGNOSIS — Z51.81 THERAPEUTIC DRUG MONITORING: Primary | ICD-10-CM

## 2023-02-22 DIAGNOSIS — E66.9 OBESITY (BMI 30-39.9): ICD-10-CM

## 2023-02-22 PROCEDURE — 3008F BODY MASS INDEX DOCD: CPT | Performed by: INTERNAL MEDICINE

## 2023-02-22 PROCEDURE — 3074F SYST BP LT 130 MM HG: CPT | Performed by: INTERNAL MEDICINE

## 2023-02-22 PROCEDURE — 3078F DIAST BP <80 MM HG: CPT | Performed by: INTERNAL MEDICINE

## 2023-02-22 PROCEDURE — 99213 OFFICE O/P EST LOW 20 MIN: CPT | Performed by: INTERNAL MEDICINE

## 2023-03-07 ENCOUNTER — HOSPITAL ENCOUNTER (OUTPATIENT)
Dept: MAMMOGRAPHY | Age: 43
Discharge: HOME | End: 2023-03-07
Attending: INTERNAL MEDICINE
Payer: COMMERCIAL

## 2023-03-07 DIAGNOSIS — Z12.31 ENCOUNTER FOR SCREENING MAMMOGRAM FOR MALIGNANT NEOPLASM OF BREAST: ICD-10-CM

## 2023-03-07 PROCEDURE — 77063 BREAST TOMOSYNTHESIS BI: CPT | Performed by: INTERNAL MEDICINE

## 2023-03-07 PROCEDURE — 77067 SCR MAMMO BI INCL CAD: CPT | Performed by: INTERNAL MEDICINE

## 2023-04-28 ENCOUNTER — LAB REQUISITION (OUTPATIENT)
Dept: LAB | Facility: HOSPITAL | Age: 43
End: 2023-04-28
Payer: COMMERCIAL

## 2023-04-28 DIAGNOSIS — D48.5 NEOPLASM OF UNCERTAIN BEHAVIOR OF SKIN: ICD-10-CM

## 2023-04-28 PROCEDURE — 88305 TISSUE EXAM BY PATHOLOGIST: CPT | Performed by: PHYSICIAN ASSISTANT

## 2023-06-08 ENCOUNTER — TELEPHONE (OUTPATIENT)
Dept: NEUROLOGY | Facility: CLINIC | Age: 43
End: 2023-06-08

## 2023-06-08 NOTE — TELEPHONE ENCOUNTER
Pt faxed in Intermittent Re certification [facility] paperwork to be completing;Requesting it be faxed to [phone] when completed; Endorsed to provider for review and completion

## 2023-06-08 NOTE — TELEPHONE ENCOUNTER
Received paperwork. Completed and endorsed to provider for signature in folder in [employer]. NO ERNIE on file    Paperwork received outside of an office visit.

## 2023-06-12 NOTE — TELEPHONE ENCOUNTER
Provider signed paperwork. Faxed copy to [employer] per patient's request. Confirmed. Copy sent to scanning and copy placed in RN file . Sent patient a message she can call front office to pay $25 paperwork fee.

## 2023-07-26 ENCOUNTER — OFFICE VISIT (OUTPATIENT)
Dept: NEUROLOGY | Facility: CLINIC | Age: 43
End: 2023-07-26
Payer: COMMERCIAL

## 2023-07-26 VITALS
RESPIRATION RATE: 16 BRPM | WEIGHT: 166 LBS | DIASTOLIC BLOOD PRESSURE: 68 MMHG | SYSTOLIC BLOOD PRESSURE: 128 MMHG | BODY MASS INDEX: 25 KG/M2 | HEART RATE: 79 BPM

## 2023-07-26 DIAGNOSIS — R41.3 MEMORY LOSS: ICD-10-CM

## 2023-07-26 DIAGNOSIS — G43.109 MIGRAINE WITH AURA AND WITHOUT STATUS MIGRAINOSUS, NOT INTRACTABLE: ICD-10-CM

## 2023-07-26 DIAGNOSIS — G43.009 MIGRAINE WITHOUT AURA AND WITHOUT STATUS MIGRAINOSUS, NOT INTRACTABLE: Primary | ICD-10-CM

## 2023-07-26 PROCEDURE — 3078F DIAST BP <80 MM HG: CPT | Performed by: OTHER

## 2023-07-26 PROCEDURE — 3074F SYST BP LT 130 MM HG: CPT | Performed by: OTHER

## 2023-07-26 PROCEDURE — 99213 OFFICE O/P EST LOW 20 MIN: CPT | Performed by: OTHER

## 2023-07-26 RX ORDER — ATOGEPANT 60 MG/1
1 TABLET ORAL DAILY
Qty: 60 TABLET | Refills: 5 | Status: SHIPPED | OUTPATIENT
Start: 2023-07-26

## 2023-07-26 RX ORDER — ELETRIPTAN HYDROBROMIDE 40 MG/1
TABLET, FILM COATED ORAL
Qty: 10 TABLET | Refills: 3 | Status: SHIPPED | OUTPATIENT
Start: 2023-07-26

## 2023-07-26 NOTE — PROGRESS NOTES
Neurology H&P    Venus Martin Patient Status:  No patient class for patient encounter    1980 MRN [id number]   Location [facility] Attending No att. providers found   Hosp Day # 0 PCP Gerry Castro MD     Subjective:  Initial Clinic HPI 22  Venus Martin is a(n) 38year old female with a PMH significant for migraines. She has previously seen my colleague Dr. Amol Medina and Dr. Cooper Cantu. She is currently taking Topamax 50mg pm for migraine prophylaxsis. She states that she only gets 2-3 migraines per year. She takes relpax for migraine relief and she reports that this works well to help abort her migraines if she takes it in time. She has only had one migraine in the past 6 months. Her migraines are preceded by a visual aura. Wavy lines in her vision for about 20 minutes prior to the migraine. She is on an estrogen containing BC product as well. Interim History:   Pt was last seen in the clinic on 22. Since that time she has been taking Qulipta 60mg daily for her migraines. She states that it has worked very well for her. She is only getting maybe 2 migraines per month. Current Medications:  Current Outpatient Medications   Medication Sig Dispense Refill    Docusate Sodium (COLACE OR) Take by mouth. ALPRAZolam 0.25 MG Oral Tab Take 1 tablet (0.25 mg total) by mouth nightly as needed for Anxiety. 10 tablet 0    sertraline 100 MG Oral Tab Take 1 tablet (100 mg total) by mouth daily. 90 tablet 3    CARVEDILOL 3.125 MG Oral Tab TAKE 1 TABLET BY MOUTH TWICE A DAY WITH MEALS 180 tablet 1    Atogepant (QULIPTA) 60 MG Oral Tab Take 1 tablet by mouth daily. 60 tablet 5    Eletriptan Hydrobromide 40 MG Oral Tab TAKE 1 TABLET BY MOUTH AT ONSET AND MAY REPEAT IF NEEDED AFTER 2 HOURS FOR A MAX OF 2 PILLS IN 24 HOURS 10 tablet 2    ondansetron 4 MG Oral Tablet Dispersible Take 1 tablet (4 mg total) by mouth every 8 (eight) hours as needed for Nausea.  20 tablet 0 levonorgestrel 20 MCG/24HR Intrauterine IUD 20 mcg (1 each total) by Intrauterine route once. Problem List:  Patient Active Problem List:     Other abnormal blood chemistry     Migraine headache     Lumbago     Urinary tract infection, site not specified     Urinary frequency     Palpitations     Screening for thyroid disorder     Screening for lipoid disorders     Iron deficiency anemia, unspecified     Supervision of other normal pregnancy, antepartum     Pregnancy     Pregnant     Anxiety      PMHx:  Past Medical History:   Diagnosis Date    Arrhythmia     chronic pvc    Depression     PVCs (premature ventricular contractions)     Chronic; Pt currently taking Toprol XL daily    Rotator cuff tear     Left shoulder       PSHx:  Past Surgical History:   Procedure Laterality Date    ANESTH, SECTION  2017    ORAL SURGERY PROCEDURE      [place] teeth extraction - no complications    UPPER GI ENDOSCOPY,EXAM      EGD       SocHx:  Social History     Socioeconomic History    Marital status:    Tobacco Use    Smoking status: Former     Years: 10.00     Types: Cigarettes     Quit date: 2013     Years since quitting: 10.2    Smokeless tobacco: Never    Tobacco comments:     13   Vaping Use    Vaping Use: Never used   Substance and Sexual Activity    Alcohol use:  Yes     Alcohol/week: 1.0 standard drink of alcohol     Types: 1 Standard drinks or equivalent per week     Comment: Cage done 20    Drug use: No    Sexual activity: Yes   Other Topics Concern    Caffeine Concern Yes     Comment: 2-3 cups coffee/day    Exercise Yes     Comment: [employer]    Seat Belt Yes       Family History:  Family History   Problem Relation Age of Onset    Bipolar Disorder Father     Depression Father     Diabetes Father     Alcohol and Other Disorders Associated Father     Hypertension Father     Other (Alcoholism) Father     Arthritis Maternal Grandfather     Diabetes Maternal Grandmother Stroke Maternal Grandmother     Other (Elevated C-reaction Protein) Sister     Hypertension Mother     Thyroid Disorder Mother     Other (Other) Mother     Lung Disorder Paternal Grandfather     Other (Pulmonary Fibrosis) Paternal Grandfather     Arthritis Paternal Grandmother     Other (Endometriosis) Sister     Other (Rectal Prolapse) Sister             ROS:  10 point ROS completed and was negative, except for pertinent positive and negatives stated in subjective. Objective/Physical Exam:    Vital Signs:  Blood pressure 128/68, pulse 79, resp. rate 16, weight 166 lb (75.3 kg), not currently breastfeeding. Gen: Awake and in no apparent distress  HEENT: moist mucus membranes  Neck: Supple  Cardiovascular: Regular rate and rhythm, no murmur  Pulm: CTAB  GI: non-tender, normal bowel sounds  Skin: normal, dry  Extremities: No clubbing or cyanosis      Neurologic:   MENTAL STATUS: alert, ox3, normal attention, language and fund of knowledge. CRANIAL NERVES II to XII: PERRLA, no ptosis or diplopia, EOM intact, facial sensation intact, strong eye closure, face is symmetric, no dysarthria, tongue midline,  no tongue fasciculations or atrophy, strong shoulder shrug. MOTOR EXAMINATION: normal tone, no fasciculations, normal strength throughout in UEs and LEs      SENSORY EXAMINATION:  UE: intact to light touch, pinprick intact  LE: intact to light touch, pinprick intact    COORDINATION:  No dysmetria, or intention tremors     REFLEXES: 2+ at biceps, 2+ brachioradialis, 2+ at patella, 2+ at the ankles     GAIT: normal stance, normal gait    Romberg's: negative        Labs:       Imaging:  MRI Brain 7/2012    FINDINGS:     The midline structures of the brain including the        pituitary have normal appearance. No acute cortical or brainstem infarct. No hydrocephalus or        midline shift. No enhancing brain mass.                             No significant disease in the visualized paranasal sinuses. CONCLUSION:     Normal exam.              Assessment: This is a 37 y/o female with a h/o migraines. She started Costa Karina after our last visit and this Senthil Afshin Ene[address]7 working very well for her. Continue Qulipta and relpax for breakthrough      Plan:  1.  Migraine  - Continue Qulipta 60mg daily  - Continue relpax for breakthrough     Vicente Gale, DO  Neurology

## 2023-07-26 NOTE — PROGRESS NOTES
Pt states here for follow up on migraines. Pt states doing well. Pt states only gets a migraines every 2 months.

## 2023-08-11 NOTE — TELEPHONE ENCOUNTER
Requesting WEGOVY  LOV: 2/22/23  RTC: not noted   Last Relevant Labs:   Filled: 1mg on feb 2023 # with refills    No future appointments. My chart sent to schedule asap. Pt VERIFIED THAT SHE IS ON 1MG DOSE AND LIKE TO CONTINUE INSTEAD MOVING UP.    Also my chart sent that lower doses are on back order and if she still wants to do 1mg wegovy

## 2023-08-13 RX ORDER — SEMAGLUTIDE 1 MG/.5ML
1 INJECTION, SOLUTION SUBCUTANEOUS WEEKLY
Qty: 2 ML | Refills: 0 | Status: SHIPPED | OUTPATIENT
Start: 2023-08-13

## 2023-09-01 ENCOUNTER — TELEPHONE (OUTPATIENT)
Dept: NEUROLOGY | Facility: CLINIC | Age: 43
End: 2023-09-01

## 2023-09-01 NOTE — TELEPHONE ENCOUNTER
Received request from [employer] for PA on Senthil Lewis 54. Previous approved from 9/12/2022 to 9/12/2023. Submitted through epic, questions answered. Approved   9/1/2023  1:55 PM  Case ID: [id number] Appeal supported: No  Note from payer: Your request was approved based on the initial information provided at the time of the coverage request submission. Please allow additional time for the final decision to be made and added to the patient's account.    Payer: [address] [place] [address]    [phone]

## 2023-09-05 NOTE — TELEPHONE ENCOUNTER
Received fax from [employer].   Approval from 09/13/2023-09/13/2024  Medication Qulipta 60 mg  Case number [id number]  Faxed approval to dispensing pharmacy  Received fax confirmation

## 2023-11-15 ENCOUNTER — OFFICE VISIT (OUTPATIENT)
Dept: INTERNAL MEDICINE CLINIC | Facility: CLINIC | Age: 43
End: 2023-11-15
Payer: COMMERCIAL

## 2023-11-15 VITALS
DIASTOLIC BLOOD PRESSURE: 78 MMHG | WEIGHT: 198 LBS | SYSTOLIC BLOOD PRESSURE: 122 MMHG | HEART RATE: 78 BPM | HEIGHT: 68 IN | BODY MASS INDEX: 30.01 KG/M2 | RESPIRATION RATE: 16 BRPM

## 2023-11-15 DIAGNOSIS — Z51.81 THERAPEUTIC DRUG MONITORING: Primary | ICD-10-CM

## 2023-11-15 DIAGNOSIS — E66.9 OBESITY (BMI 30-39.9): ICD-10-CM

## 2023-11-15 DIAGNOSIS — R63.2 BINGE EATING: ICD-10-CM

## 2023-11-15 PROCEDURE — 3074F SYST BP LT 130 MM HG: CPT | Performed by: INTERNAL MEDICINE

## 2023-11-15 PROCEDURE — 3078F DIAST BP <80 MM HG: CPT | Performed by: INTERNAL MEDICINE

## 2023-11-15 PROCEDURE — 99214 OFFICE O/P EST MOD 30 MIN: CPT | Performed by: INTERNAL MEDICINE

## 2023-11-15 PROCEDURE — 3008F BODY MASS INDEX DOCD: CPT | Performed by: INTERNAL MEDICINE

## 2023-11-15 RX ORDER — SEMAGLUTIDE 0.68 MG/ML
INJECTION, SOLUTION SUBCUTANEOUS
Qty: 1 EACH | Refills: 12 | Status: SHIPPED | OUTPATIENT
Start: 2023-11-15

## 2023-11-15 RX ORDER — LISDEXAMFETAMINE DIMESYLATE CAPSULES 30 MG/1
30 CAPSULE ORAL EVERY MORNING
Qty: 30 CAPSULE | Refills: 0 | Status: SHIPPED | OUTPATIENT
Start: 2023-11-15

## 2023-11-16 ENCOUNTER — TELEPHONE (OUTPATIENT)
Dept: INTERNAL MEDICINE CLINIC | Facility: CLINIC | Age: 43
End: 2023-11-16

## 2023-11-16 NOTE — TELEPHONE ENCOUNTER
Continue vyvanse new start   Recommend trial of zepbound 2.5 mg once approved dec 8th   I can write rx for patient to  and take to pharmacy

## 2023-12-07 ENCOUNTER — PATIENT MESSAGE (OUTPATIENT)
Dept: INTERNAL MEDICINE CLINIC | Facility: CLINIC | Age: 43
End: 2023-12-07

## 2023-12-07 NOTE — TELEPHONE ENCOUNTER
Last visit 11/15/23  Do you want to order zepbound  - Rx was printed on 11/16/23. Note to patient to try and fill.

## 2023-12-13 ENCOUNTER — TELEPHONE (OUTPATIENT)
Dept: INTERNAL MEDICINE CLINIC | Facility: CLINIC | Age: 43
End: 2023-12-13

## 2023-12-19 RX ORDER — TIRZEPATIDE 2.5 MG/.5ML
2.5 INJECTION, SOLUTION SUBCUTANEOUS WEEKLY
Qty: 2 ML | Refills: 0 | Status: SHIPPED | OUTPATIENT
Start: 2023-12-19

## 2023-12-26 NOTE — TELEPHONE ENCOUNTER
Zepbound was denied must have tried and had poor response to two formulary alternative drugs Wegovy and Saxenda. Please advised.

## 2023-12-30 NOTE — TELEPHONE ENCOUNTER
Reapplied today and attached information that she has tried saxenda and wegovy in past - also attached FDA shortage notices,  Tried phentermine and qsymia - cannot take now as currently on vyvanse  Awaiting decision    PATRICK CRUZ (Key: [id number])

## 2024-01-08 NOTE — TELEPHONE ENCOUNTER
Lisa Martin (Key: [id number])  PA Case ID #: [id number]  Need Help? Call us at [phone]  Outcome  Approved today  CaseId:[id number];Status:Approved;Review Type:Prior Auth;Coverage Start Date:01/01/2024;Coverage End Date:09/27/2024;  Authorization Expiration Date: 9/26/2024  Drug  Zepbound 2.5MG/0.5ML pen-injectors  ePA cloud logo  Form  [employer] Electronic PA Form (2017 [place]CPDP)

## 2024-01-08 NOTE — TELEPHONE ENCOUNTER
New insurance Hannah  Applied for coverage again on CMM  Awaiting decision    Lisa Martin (Key: [id number])

## 2024-01-29 ENCOUNTER — TELEPHONE (OUTPATIENT)
Dept: NEUROLOGY | Facility: CLINIC | Age: 44
End: 2024-01-29

## 2024-01-31 NOTE — TELEPHONE ENCOUNTER
[employer] FMLA Received in Forms and logged for processing. No ERNIE on file. Notification sent to pt via [employer]

## 2024-02-01 ENCOUNTER — PATIENT MESSAGE (OUTPATIENT)
Dept: INTERNAL MEDICINE CLINIC | Facility: CLINIC | Age: 44
End: 2024-02-01

## 2024-02-01 DIAGNOSIS — F41.9 ANXIETY: ICD-10-CM

## 2024-02-01 NOTE — TELEPHONE ENCOUNTER
Dr. Roland,     *The ACKNOWLEDGE button has been moved to the top right ribbon*    Please sign off on form if you agree to:  Intermittent FMLA (migraines), start date: 2/1/24, end date: 8/1/24  (place your signature on the first page only)    -From your Inbasket, Highlight the patient and click Chart   -Double click the 1/29/24 Forms Completion telephone encounter  -Scroll down to the Media section   -Click the blue Hyperlink:  FMLA Dr. Roland 2/1/24  -Click Acknowledge located in the top right ribbon/menu   -Drag the mouse into the blank space of the document and a + sign will appear. Left click to   electronically sign the document.     Thank you,    Saima WEST

## 2024-02-02 RX ORDER — SERTRALINE HYDROCHLORIDE 100 MG/1
100 TABLET, FILM COATED ORAL DAILY
Qty: 90 TABLET | Refills: 3 | Status: SHIPPED | OUTPATIENT
Start: 2024-02-02

## 2024-02-02 NOTE — TELEPHONE ENCOUNTER
From: Lisa Martin  To: Sheri Dillon  Sent: 2/1/2024 2:07 PM CST  Subject: Zepbound    Hey girls! So thankful that I was finally able to get the Zepbound, I really appreciate all of your efforts with that one! I've only done 2 weeks but so far am not feeling anything, I know it's a titratable dose just not sure how long to stay on the 2.5 before moving up? If it's appropriate to move forward with a higher dosage would you please send that over to the [facility] pharmacy? Thank you!!

## 2024-02-15 NOTE — TELEPHONE ENCOUNTER
Requesting Zepbound increase  LOV: 11/15/23  RTC: not noted  Last Relevant Labs: na  Filled: 1/12/24 #2ml with 0 refills  zepbound 2.5 mg    Future Appointments   Date Time Provider Department Center   2/22/2024  9:40 AM Sheri Dillon MD [facility]       Weight in my chart

## 2024-02-15 NOTE — TELEPHONE ENCOUNTER
FMLA forms completed & faxed to [facility] at # [phone]. Fax confirmation received.    [employer] message sent to pt.

## 2024-02-19 RX ORDER — TIRZEPATIDE 5 MG/.5ML
5 INJECTION, SOLUTION SUBCUTANEOUS WEEKLY
Qty: 2 ML | Refills: 0 | Status: SHIPPED | OUTPATIENT
Start: 2024-02-19

## 2024-02-22 ENCOUNTER — OFFICE VISIT (OUTPATIENT)
Dept: INTERNAL MEDICINE CLINIC | Facility: CLINIC | Age: 44
End: 2024-02-22
Payer: COMMERCIAL

## 2024-02-22 VITALS
SYSTOLIC BLOOD PRESSURE: 122 MMHG | HEART RATE: 75 BPM | WEIGHT: 194 LBS | BODY MASS INDEX: 29.4 KG/M2 | HEIGHT: 68 IN | DIASTOLIC BLOOD PRESSURE: 80 MMHG

## 2024-02-22 DIAGNOSIS — Z51.81 THERAPEUTIC DRUG MONITORING: Primary | ICD-10-CM

## 2024-02-22 DIAGNOSIS — E66.9 OBESITY (BMI 30-39.9): ICD-10-CM

## 2024-02-22 DIAGNOSIS — I49.3 PVC (PREMATURE VENTRICULAR CONTRACTION): ICD-10-CM

## 2024-02-22 PROCEDURE — 99214 OFFICE O/P EST MOD 30 MIN: CPT | Performed by: INTERNAL MEDICINE

## 2024-02-22 NOTE — PROGRESS NOTES
HISTORY OF PRESENT ILLNESS  Chief Complaint   Patient presents with    Weight Check     Lost 4 pounds       Lisa Martin is a 43 year old female here for follow up in medical weight loss program.     Denies chest pain, shortness of breath, dizziness, blurred vision, headache, paresthesia, nausea/vomiting.     Down 4 lb from previous   Started zepbound 2.5 mg x 1month    Having new onset and return and migraines.   Otherwise tolerating medication   Currently not consistent with exercise   Goal to get back into a routine    Wt Readings from Last 6 Encounters:   02/22/24 194 lb (88 kg)   11/15/23 198 lb (89.8 kg)   07/26/23 166 lb (75.3 kg)   02/22/23 166 lb (75.3 kg)   01/24/23 169 lb (76.7 kg)   09/28/22 157 lb (71.2 kg)            Breakfast Lunch Dinner Snacks Fluids   reviewed           REVIEW OF SYSTEMS  GENERAL HEALTH: feels well otherwise, denied any fevers chills or night sweats   RESPIRATORY: denies shortness of breath   CARDIOVASCULAR: denies chest pain  GI: denies abdominal pain    EXAM  /80   Pulse 75   Ht 5' 8\" (1.727 m)   Wt 194 lb (88 kg)   BMI 29.50 kg/m²   EXAM  GENERAL: well developed, well nourished,in no apparent distress, A/O x3  SKIN: no rashes,no suspicious lesions  HEENT: atraumatic, normocephalic, OP-clear, PERRL  NECK: supple,no adenopathy  LUNGS: clear to auscultation bilaterally   CARDIO: RRR without murmur  GI: good BS's,NT/ND, no masses or HSM  EXTREMITIES: no cyanosis, no clubbing, no edema      Lab Results   Component Value Date    WBC 3.7 (L) 01/23/2023    RBC 4.27 01/23/2023    HGB 14.0 01/23/2023    HCT 40.4 01/23/2023    MCV 94.6 01/23/2023    MCH 32.8 01/23/2023    MCHC 34.7 01/23/2023    RDW 12.4 01/23/2023    .0 01/23/2023     Lab Results   Component Value Date     (H) 01/23/2023    BUN 9 01/23/2023    BUNCREA 17.9 07/13/2021    CREATSERUM 0.64 01/23/2023    ANIONGAP 4 01/23/2023    GFRNAA 112 07/13/2021    GFRAA 129 07/13/2021    CA 8.9  01/23/2023    OSMOCALC 287 01/23/2023    ALKPHO 69 01/23/2023    AST 21 01/23/2023    ALT 24 01/23/2023    BILT 0.9 01/23/2023    TP 7.1 01/23/2023    ALB 3.7 01/23/2023    GLOBULIN 3.4 01/23/2023    AGRATIO 1.8 06/26/2012     01/23/2023    K 4.1 01/23/2023     01/23/2023    CO2 28.0 01/23/2023     No results found for: \"EAG\", \"A1C\"  Lab Results   Component Value Date    CHOLEST 159 01/23/2023    TRIG 75 01/23/2023    HDL 67 (H) 01/23/2023    LDL 78 01/23/2023    VLDL 12 01/23/2023    TCHDLRATIO 2.8 03/16/2011    NONHDLC 92 01/23/2023     Lab Results   Component Value Date    T4F 0.9 07/13/2021    TSH 1.650 01/23/2023     Lab Results   Component Value Date    B12 565 07/13/2021     Lab Results   Component Value Date    VITD 38.3 07/13/2021       Current Outpatient Medications on File Prior to Visit   Medication Sig Dispense Refill    Tirzepatide-Weight Management (ZEPBOUND) 5 MG/0.5ML Subcutaneous Solution Auto-injector Inject 5 mg into the skin once a week. 2 mL 0    SERTRALINE 100 MG Oral Tab TAKE 1 TABLET BY MOUTH EVERY DAY 90 tablet 3    CUSTOM MEDICATION Inject 2.5 mg into the skin once a week. ZEPBOUND 2.5 mg/ 0.5 ML 4 each 0    CUSTOM MEDICATION ZEPBOUND 2.5 mg / 0.5 mL   One injection subcutaneously per week. 4 each 0    Docusate Sodium (COLACE OR) Take by mouth.      Atogepant (QULIPTA) 60 MG Oral Tab Take 1 tablet by mouth daily. 60 tablet 5    Eletriptan Hydrobromide 40 MG Oral Tab TAKE 1 TABLET BY MOUTH AT ONSET AND MAY REPEAT IF NEEDED AFTER 2 HOURS FOR A MAX OF 2 PILLS IN 24 HOURS 10 tablet 3    ALPRAZolam 0.25 MG Oral Tab Take 1 tablet (0.25 mg total) by mouth nightly as needed for Anxiety. 10 tablet 0    CARVEDILOL 3.125 MG Oral Tab TAKE 1 TABLET BY MOUTH TWICE A DAY WITH MEALS 180 tablet 1    ondansetron 4 MG Oral Tablet Dispersible Take 1 tablet (4 mg total) by mouth every 8 (eight) hours as needed for Nausea. 20 tablet 0    levonorgestrel 20 MCG/24HR Intrauterine IUD 20 mcg (1 each  total) by Intrauterine route once.       No current facility-administered medications on file prior to visit.       ASSESSMENT  Analyzed weight data:       Diagnoses and all orders for this visit:    Therapeutic drug monitoring  -     Tirzepatide 5 MG/0.5ML Subcutaneous Solution Pen-injector; Inject 5 mg into the skin once a week. ZEPBOUND    Obesity (BMI 30-39.9)  -     Tirzepatide 5 MG/0.5ML Subcutaneous Solution Pen-injector; Inject 5 mg into the skin once a week. ZEPBOUND    PVC (premature ventricular contraction)  -     Tirzepatide 5 MG/0.5ML Subcutaneous Solution Pen-injector; Inject 5 mg into the skin once a week. ZEPBOUND          PLAN  Initial consult: 207 lb on 6/3/19   Down 4 lb   Down 13 lb total   Total time spent on chart review, pre-charting, obtaining history, counseling, and educating, reviewing labs was 30 minutes.  Doing well with zepbound new start  Reviewed dose increase as tolerated and response curve  Hold on vyvanse at this time.   Reviewed possible increase In migraines since starting, will continue to monitor.  Nutrition: low carb diet/ recommended to eat breakfast daily/ regular protein intake  Medication use and side effects reviewed with patient.  Medication contraindications: n/a  Follow up with dietitian and psychologist as recommended.  Discussed the role of sleep and stress in weight management.  Counseled on comprehensive weight loss plan including attention to nutrition, exercise and behavior/stress management for success. See patient instruction below for more details.  Discussed strategies to overcome barriers to successful weight loss and weight maintenance  FITTE: ACSM recommendations (150-300 minutes/ week in active weight loss)   Weight Loss consent to treat reviewed and signed     There are no Patient Instructions on file for this visit.    No follow-ups on file.    Patient verbalizes understanding.    Sheri Dillon MD

## 2024-02-29 ENCOUNTER — OFFICE VISIT (OUTPATIENT)
Dept: NEUROLOGY | Facility: CLINIC | Age: 44
End: 2024-02-29
Payer: COMMERCIAL

## 2024-02-29 VITALS
WEIGHT: 191.56 LBS | RESPIRATION RATE: 15 BRPM | HEART RATE: 86 BPM | SYSTOLIC BLOOD PRESSURE: 100 MMHG | BODY MASS INDEX: 29 KG/M2 | DIASTOLIC BLOOD PRESSURE: 60 MMHG

## 2024-02-29 DIAGNOSIS — G43.009 MIGRAINE WITHOUT AURA AND WITHOUT STATUS MIGRAINOSUS, NOT INTRACTABLE: Primary | ICD-10-CM

## 2024-02-29 DIAGNOSIS — G43.109 MIGRAINE WITH AURA AND WITHOUT STATUS MIGRAINOSUS, NOT INTRACTABLE: ICD-10-CM

## 2024-02-29 PROCEDURE — 99213 OFFICE O/P EST LOW 20 MIN: CPT | Performed by: OTHER

## 2024-02-29 RX ORDER — METHYLPREDNISOLONE 4 MG/1
TABLET ORAL
Qty: 1 EACH | Refills: 0 | Status: SHIPPED | OUTPATIENT
Start: 2024-02-29

## 2024-02-29 RX ORDER — ONDANSETRON 4 MG/1
4 TABLET, ORALLY DISINTEGRATING ORAL EVERY 8 HOURS PRN
Qty: 20 TABLET | Refills: 1 | Status: SHIPPED | OUTPATIENT
Start: 2024-02-29

## 2024-02-29 RX ORDER — ATOGEPANT 60 MG/1
1 TABLET ORAL DAILY
Qty: 60 TABLET | Refills: 5 | Status: SHIPPED | OUTPATIENT
Start: 2024-02-29

## 2024-02-29 RX ORDER — ELETRIPTAN HYDROBROMIDE 40 MG/1
TABLET, FILM COATED ORAL
Qty: 10 TABLET | Refills: 3 | Status: SHIPPED | OUTPATIENT
Start: 2024-02-29

## 2024-02-29 NOTE — PROGRESS NOTES
Neurology H&P    Lisa Martin Patient Status:  No patient class for patient encounter    1980 MRN [id number]   Location [facility] Attending No att. providers found   Hosp Day # 0 PCP Daily Urena MD     Subjective:  Initial Clinic HPI 22  Lisa Martin is a(n) 43 year old female with a PMH significant for migraines. She has previously seen my colleague Dr. Sorto and Dr. Talavera. She is currently taking Topamax 50mg pm for migraine prophylaxsis. She states that she only gets 2-3 migraines per year. She takes relpax for migraine relief and she reports that this works well to help abort her migraines if she takes it in time. She has only had one migraine in the past 6 months. Her migraines are preceded by a visual aura. Wavy lines in her vision for about 20 minutes prior to the migraine. She is on an estrogen containing BC product as well.     Interim History:   Pt was last seen in the clinic on 23. Since that time she has been taking Qulipta 60mg daily for her migraines. She states that it has worked very well for her.  She states that she has had daily migraines since . She states that prior to this she was doing very well on qulipta. She states that she uses eletriptan works well to abort her headaches. She gets nauseated and has no vomintg but headaches are about an 8/10 on the pain scale         Current Medications:  Current Outpatient Medications   Medication Sig Dispense Refill    Tirzepatide 5 MG/0.5ML Subcutaneous Solution Pen-injector Inject 5 mg into the skin once a week. ZEPBOUND 2 mL 2    Tirzepatide-Weight Management (ZEPBOUND) 5 MG/0.5ML Subcutaneous Solution Auto-injector Inject 5 mg into the skin once a week. 2 mL 0    SERTRALINE 100 MG Oral Tab TAKE 1 TABLET BY MOUTH EVERY DAY 90 tablet 3    Docusate Sodium (COLACE OR) Take by mouth.      Atogepant (QULIPTA) 60 MG Oral Tab Take 1 tablet by mouth daily. 60 tablet 5    Eletriptan Hydrobromide  40 MG Oral Tab TAKE 1 TABLET BY MOUTH AT ONSET AND MAY REPEAT IF NEEDED AFTER 2 HOURS FOR A MAX OF 2 PILLS IN 24 HOURS 10 tablet 3    ALPRAZolam 0.25 MG Oral Tab Take 1 tablet (0.25 mg total) by mouth nightly as needed for Anxiety. 10 tablet 0    CARVEDILOL 3.125 MG Oral Tab TAKE 1 TABLET BY MOUTH TWICE A DAY WITH MEALS 180 tablet 1    ondansetron 4 MG Oral Tablet Dispersible Take 1 tablet (4 mg total) by mouth every 8 (eight) hours as needed for Nausea. 20 tablet 0    levonorgestrel 20 MCG/24HR Intrauterine IUD 20 mcg (1 each total) by Intrauterine route once.         Problem List:  Patient Active Problem List   Diagnosis    Other abnormal blood chemistry    Migraine headache    Lumbago    Urinary tract infection, site not specified    Urinary frequency    Palpitations    Screening for thyroid disorder    Screening for lipoid disorders    Iron deficiency anemia, unspecified    Supervision of other normal pregnancy, antepartum (HCC)    Pregnancy (HCC)    Pregnant (HCC)    Anxiety    Memory loss       PMHx:  Past Medical History:   Diagnosis Date    Anxiety 2017    Zoloft daily    Arrhythmia 2012    chronic pvc    Depression     Migraines     PVCs (premature ventricular contractions)     Chronic; Pt currently taking Toprol XL daily    Rotator cuff tear 2002    Left shoulder       PSHx:  Past Surgical History:   Procedure Laterality Date    ANESTH, SECTION  2017    ORAL SURGERY PROCEDURE  2000    [place] teeth extraction - no complications    UPPER GI ENDOSCOPY,EXAM      EGD       SocHx:  Social History     Socioeconomic History    Marital status:    Tobacco Use    Smoking status: Former     Years: 10     Types: Cigarettes     Quit date: 2013     Years since quitting: 10.8    Smokeless tobacco: Never    Tobacco comments:     13   Vaping Use    Vaping Use: Never used   Substance and Sexual Activity    Alcohol use: Yes     Alcohol/week: 1.0 standard drink of alcohol     Types:  1 Standard drinks or equivalent per week     Comment: Cage done 7/1/20    Drug use: No    Sexual activity: Yes   Other Topics Concern    Caffeine Concern Yes    Stress Concern No    Weight Concern No    Special Diet No    Exercise Yes    Seat Belt Yes       Family History:  Family History   Problem Relation Age of Onset    Bipolar Disorder Father     Depression Father     Diabetes Father     Alcohol and Other Disorders Associated Father     Hypertension Father     Other (Alcoholism) Father     Arthritis Maternal Grandfather     Diabetes Maternal Grandmother     Stroke Maternal Grandmother     Other (Elevated C-reaction Protein) Sister     Hypertension Mother     Thyroid Disorder Mother     Other (Other) Mother     Lung Disorder Paternal Grandfather     Other (Pulmonary Fibrosis) Paternal Grandfather     Arthritis Paternal Grandmother     Other (Endometriosis) Sister     Other (Rectal Prolapse) Sister             ROS:  10 point ROS completed and was negative, except for pertinent positive and negatives stated in subjective.    Objective/Physical Exam:    Vital Signs:  Blood pressure 100/60, pulse 86, resp. rate 15, weight 191 lb 9.3 oz (86.9 kg), not currently breastfeeding.    Gen: Awake and in no apparent distress  HEENT: moist mucus membranes  Neck: Supple  Cardiovascular: Regular rate and rhythm, no murmur  Pulm: CTAB  GI: non-tender, normal bowel sounds  Skin: normal, dry  Extremities: No clubbing or cyanosis      Neurologic:   MENTAL STATUS: alert, ox3, normal attention, language and fund of knowledge.      CRANIAL NERVES II to XII: PERRLA, no ptosis or diplopia, EOM intact, facial sensation intact, strong eye closure, face is symmetric, no dysarthria, tongue midline,  no tongue fasciculations or atrophy, strong shoulder shrug.    MOTOR EXAMINATION: normal tone, no fasciculations, normal strength throughout in UEs and LEs      SENSORY EXAMINATION:  UE: intact to light touch, pinprick intact  LE: intact to light  touch, pinprick intact    COORDINATION:  No dysmetria, or intention tremors     REFLEXES: 2+ at biceps, 2+ brachioradialis, 2+ at patella, 2+ at the ankles     GAIT: normal stance, normal gait    Romberg's: negative        Labs:       Imaging:  MRI Brain 7/2012    FINDINGS:     The midline structures of the brain including the        pituitary have normal appearance.             No acute cortical or brainstem infarct.  No hydrocephalus or        midline shift.                      No enhancing brain mass.                            No significant disease in the visualized paranasal sinuses.                                   CONCLUSION:     Normal exam.              Assessment:  This is a 41 y/o female with a h/o migraines. She started Qulipta after our last visit and this ios working very well for her. Continue Qulipta and relpax for breakthrough. A she has had more migraines in the past couple of weeks This may be due to barometric pressure changes. I can try a MDP for now. We also discussed topamax or a TCA but she does not want to take these. We can consider Depakote if her migraines continue      Plan:  1. Migraine  - Continue Qulipta 60mg daily  - Continue relpax for breakthrough     Shayne Roland, DO  Neurology

## 2024-02-29 NOTE — PROGRESS NOTES
Pt reports migraines have increased in the last two weeks she has had 10 migraines.    Prior to the last two weeks she had one migraine every 3 months.

## 2024-02-29 NOTE — PATIENT INSTRUCTIONS
After your visit at the [facility] today,  please direct any follow up questions or medication needs to the staff in our [place] office so that your concerns may be promptly addressed.  We are available through [email or web address] or at the numbers below:    The phone number is:   [phone] option #1    The fax number is:  [phone]    Your pharmacy should also send any requests electronically to the [place] office.  Refill policies:    Allow 2-3 business days for refills; controlled substances may take longer.  Contact your pharmacy at least 5 days prior to running out of medication and have them send an electronic request or submit request through the “request refill” option in your [email or web address] account.  Refills are not addressed on weekends; covering physicians do not authorize routine medications on weekends.  No narcotics or controlled substances are refilled after noon on Fridays or by on call physicians.  By law, narcotics must be electronically prescribed.  A 30 day supply with no refills is the maximum allowed.  If your prescription is due for a refill, you may be due for a follow up appointment.  To best provide you care, patients receiving routine medications need to be seen at least once a year.  Patients receiving narcotic/controlled substance medications need to be seen at least once every 3 months.  In the event that your preferred pharmacy does not have the requested medication in stock (e.g. Backordered), it is your responsibility to find another pharmacy that has the requested medication available.  We will gladly send a new prescription to that pharmacy at your request.    Scheduling Tests:    If your physician has ordered radiology tests such as MRI or CT scans, please contact Central Scheduling at [phone] right away to schedule the test.  Once scheduled, the [facility] Centralized Referral Team will work with your insurance carrier to obtain pre-certification or prior authorization.   Depending on your insurance carrier, approval may take 3-10 days.  It is highly recommended patients assure they have received an authorization before having a test performed.  If test is done without insurance authorization, patient may be responsible for the entire amount billed.      Precertification and Prior Authorizations:  If your physician has recommended that you have a procedure or additional testing performed the [facility] Centralized Referral Team will contact your insurance carrier to obtain pre-certification or prior authorization.    You are strongly encouraged to contact your insurance carrier to verify that your procedure/test has been approved and is a COVERED benefit.  Although the [facility] Centralized Referral Team does its due diligence, the insurance carrier gives the disclaimer that \"Although the procedure is authorized, this does not guarantee payment.\"    Ultimately the patient is responsible for payment.   Thank you for your understanding in this matter.  Paperwork Completion:  If you require FMLA or disability paperwork for your recovery, please make sure to either drop it off or have it faxed to our office at [phone]. Be sure the form has your name and date of birth on it.  The form will be faxed to our Forms Department and they will complete it for you.  There is a 25$ fee for all forms that need to be filled out.  Please be aware there is a 10-14 day turnaround time.  You will need to sign a release of information (ERNIE) form if your paperwork does not come with one.  You may call the Forms Department with any questions at [phone].  Their fax number is [phone].

## 2024-04-05 RX ORDER — CARVEDILOL 3.12 MG/1
3.12 TABLET ORAL 2 TIMES DAILY WITH MEALS
Qty: 180 TABLET | Refills: 2 | Status: SHIPPED | OUTPATIENT
Start: 2024-04-05

## 2024-04-05 NOTE — TELEPHONE ENCOUNTER
Requesting   Requested Prescriptions     Pending Prescriptions Disp Refills    CARVEDILOL 3.125 MG Oral Tab [Pharmacy Med Name: CARVEDILOL 3.125 MG TABLET] 180 tablet 2     Sig: TAKE 1 TABLET BY MOUTH TWICE A DAY WITH MEALS     LOV: 2/22/24  RTC: not noted  Filled: 1/9/23 #180 with 1 refills    Future Appointments   Date Time Provider Department Center   4/26/2024  9:20 AM Daily Urena MD [facility]

## 2024-04-11 ENCOUNTER — PATIENT MESSAGE (OUTPATIENT)
Dept: INTERNAL MEDICINE CLINIC | Facility: CLINIC | Age: 44
End: 2024-04-11

## 2024-04-11 ENCOUNTER — TELEPHONE (OUTPATIENT)
Dept: OBGYN UNIT | Facility: HOSPITAL | Age: 44
End: 2024-04-11

## 2024-04-11 RX ORDER — NEOMYCIN POLYMYXIN B SULFATES AND DEXAMETHASONE 3.5; 10000; 1 MG/ML; [USP'U]/ML; MG/ML
1 SUSPENSION/ DROPS OPHTHALMIC 2 TIMES DAILY
Qty: 5 ML | Refills: 0 | Status: SHIPPED | OUTPATIENT
Start: 2024-04-11

## 2024-04-11 NOTE — TELEPHONE ENCOUNTER
From: Lisa Martin  To: Sheri Dillon  Sent: 4/11/2024 8:44 AM CDT  Subject: Zepbound    Good morning! Wondering if you guys could call in the next highest Zepbound dose to the [place] pharmacy, I've done the 5mg for 2 months now.    Last injection: 04/05/24  Side effects: None  Last weight: 185    Thank you!!!  Lisa

## 2024-04-11 NOTE — TELEPHONE ENCOUNTER
Requesting Zepbound increase  LOV: 2/22/24  RTC: not noted  Last Relevant Labs: na  Filled: 2/22/24 #2ml with 2 refills    Future Appointments   Date Time Provider Department Center   4/16/2024 11:20 AM [facility]   4/26/2024  9:20 AM Daily Urena MD [facility]

## 2024-04-23 ENCOUNTER — LAB ENCOUNTER (OUTPATIENT)
Dept: LAB | Age: 44
End: 2024-04-23
Attending: INTERNAL MEDICINE
Payer: COMMERCIAL

## 2024-04-23 DIAGNOSIS — Z13.220 SCREENING FOR LIPID DISORDERS: ICD-10-CM

## 2024-04-23 DIAGNOSIS — Z13.228 SCREENING FOR METABOLIC DISORDER: ICD-10-CM

## 2024-04-23 DIAGNOSIS — Z13.29 SCREENING FOR THYROID DISORDER: ICD-10-CM

## 2024-04-23 DIAGNOSIS — Z00.00 ROUTINE GENERAL MEDICAL EXAMINATION AT A HEALTH CARE FACILITY: ICD-10-CM

## 2024-04-23 DIAGNOSIS — Z13.0 SCREENING FOR BLOOD DISEASE: ICD-10-CM

## 2024-04-23 LAB
ALBUMIN SERPL-MCNC: 3.9 G/DL (ref 3.4–5)
ALBUMIN/GLOB SERPL: 1.2 {RATIO} (ref 1–2)
ALP LIVER SERPL-CCNC: 69 U/L
ALT SERPL-CCNC: 19 U/L
ANION GAP SERPL CALC-SCNC: 4 MMOL/L (ref 0–18)
AST SERPL-CCNC: 7 U/L (ref 15–37)
BASOPHILS # BLD AUTO: 0.02 X10(3) UL (ref 0–0.2)
BASOPHILS NFR BLD AUTO: 0.3 %
BILIRUB SERPL-MCNC: 1.3 MG/DL (ref 0.1–2)
BUN BLD-MCNC: 10 MG/DL (ref 9–23)
CALCIUM BLD-MCNC: 8.9 MG/DL (ref 8.5–10.1)
CHLORIDE SERPL-SCNC: 109 MMOL/L (ref 98–112)
CHOLEST SERPL-MCNC: 221 MG/DL (ref ?–200)
CO2 SERPL-SCNC: 27 MMOL/L (ref 21–32)
CREAT BLD-MCNC: 0.72 MG/DL
EGFRCR SERPLBLD CKD-EPI 2021: 108 ML/MIN/1.73M2 (ref 60–?)
EOSINOPHIL # BLD AUTO: 0.08 X10(3) UL (ref 0–0.7)
EOSINOPHIL NFR BLD AUTO: 1.2 %
ERYTHROCYTE [DISTWIDTH] IN BLOOD BY AUTOMATED COUNT: 12.4 %
FASTING PATIENT LIPID ANSWER: YES
FASTING STATUS PATIENT QL REPORTED: YES
GLOBULIN PLAS-MCNC: 3.3 G/DL (ref 2.8–4.4)
GLUCOSE BLD-MCNC: 96 MG/DL (ref 70–99)
HCT VFR BLD AUTO: 39.3 %
HDLC SERPL-MCNC: 65 MG/DL (ref 40–59)
HGB BLD-MCNC: 14.1 G/DL
IMM GRANULOCYTES # BLD AUTO: 0.03 X10(3) UL (ref 0–1)
IMM GRANULOCYTES NFR BLD: 0.4 %
LDLC SERPL CALC-MCNC: 142 MG/DL (ref ?–100)
LYMPHOCYTES # BLD AUTO: 1.26 X10(3) UL (ref 1–4)
LYMPHOCYTES NFR BLD AUTO: 18.5 %
MCH RBC QN AUTO: 32.9 PG (ref 26–34)
MCHC RBC AUTO-ENTMCNC: 35.9 G/DL (ref 31–37)
MCV RBC AUTO: 91.8 FL
MONOCYTES # BLD AUTO: 0.51 X10(3) UL (ref 0.1–1)
MONOCYTES NFR BLD AUTO: 7.5 %
NEUTROPHILS # BLD AUTO: 4.9 X10 (3) UL (ref 1.5–7.7)
NEUTROPHILS # BLD AUTO: 4.9 X10(3) UL (ref 1.5–7.7)
NEUTROPHILS NFR BLD AUTO: 72.1 %
NONHDLC SERPL-MCNC: 156 MG/DL (ref ?–130)
OSMOLALITY SERPL CALC.SUM OF ELEC: 289 MOSM/KG (ref 275–295)
PLATELET # BLD AUTO: 273 10(3)UL (ref 150–450)
POTASSIUM SERPL-SCNC: 4.3 MMOL/L (ref 3.5–5.1)
PROT SERPL-MCNC: 7.2 G/DL (ref 6.4–8.2)
RBC # BLD AUTO: 4.28 X10(6)UL
SODIUM SERPL-SCNC: 140 MMOL/L (ref 136–145)
TRIGL SERPL-MCNC: 80 MG/DL (ref 30–149)
TSI SER-ACNC: 1.02 MIU/ML (ref 0.36–3.74)
VLDLC SERPL CALC-MCNC: 15 MG/DL (ref 0–30)
WBC # BLD AUTO: 6.8 X10(3) UL (ref 4–11)

## 2024-04-23 PROCEDURE — 80061 LIPID PANEL: CPT

## 2024-04-23 PROCEDURE — 85025 COMPLETE CBC W/AUTO DIFF WBC: CPT

## 2024-04-23 PROCEDURE — 36415 COLL VENOUS BLD VENIPUNCTURE: CPT

## 2024-04-23 PROCEDURE — 80053 COMPREHEN METABOLIC PANEL: CPT

## 2024-04-23 PROCEDURE — 84443 ASSAY THYROID STIM HORMONE: CPT

## 2024-04-26 ENCOUNTER — HOSPITAL ENCOUNTER (OUTPATIENT)
Dept: GENERAL RADIOLOGY | Age: 44
Discharge: HOME | End: 2024-04-26
Attending: INTERNAL MEDICINE
Payer: COMMERCIAL

## 2024-04-26 ENCOUNTER — OFFICE VISIT (OUTPATIENT)
Dept: INTERNAL MEDICINE CLINIC | Facility: CLINIC | Age: 44
End: 2024-04-26
Payer: COMMERCIAL

## 2024-04-26 VITALS
HEART RATE: 80 BPM | TEMPERATURE: 97 F | OXYGEN SATURATION: 99 % | DIASTOLIC BLOOD PRESSURE: 60 MMHG | SYSTOLIC BLOOD PRESSURE: 98 MMHG | WEIGHT: 187 LBS | BODY MASS INDEX: 28.67 KG/M2 | HEIGHT: 67.72 IN

## 2024-04-26 DIAGNOSIS — M25.551 HIP PAIN, CHRONIC, RIGHT: ICD-10-CM

## 2024-04-26 DIAGNOSIS — Z12.31 ENCOUNTER FOR SCREENING MAMMOGRAM FOR MALIGNANT NEOPLASM OF BREAST: ICD-10-CM

## 2024-04-26 DIAGNOSIS — F41.9 ANXIETY: ICD-10-CM

## 2024-04-26 DIAGNOSIS — G89.29 HIP PAIN, CHRONIC, RIGHT: ICD-10-CM

## 2024-04-26 DIAGNOSIS — Z00.00 ROUTINE GENERAL MEDICAL EXAMINATION AT A HEALTH CARE FACILITY: Primary | ICD-10-CM

## 2024-04-26 DIAGNOSIS — G89.29 CHRONIC RIGHT SI JOINT PAIN: ICD-10-CM

## 2024-04-26 DIAGNOSIS — M53.3 CHRONIC RIGHT SI JOINT PAIN: ICD-10-CM

## 2024-04-26 PROBLEM — I49.9 ARRHYTHMIA: Status: ACTIVE | Noted: 2024-04-26

## 2024-04-26 PROCEDURE — 99396 PREV VISIT EST AGE 40-64: CPT | Performed by: INTERNAL MEDICINE

## 2024-04-26 PROCEDURE — 96127 BRIEF EMOTIONAL/BEHAV ASSMT: CPT | Performed by: INTERNAL MEDICINE

## 2024-04-26 PROCEDURE — 73502 X-RAY EXAM HIP UNI 2-3 VIEWS: CPT | Performed by: INTERNAL MEDICINE

## 2024-04-26 RX ORDER — ONDANSETRON 4 MG/1
4 TABLET, ORALLY DISINTEGRATING ORAL EVERY 8 HOURS PRN
COMMUNITY
Start: 2024-04-26

## 2024-04-26 RX ORDER — ALPRAZOLAM 0.25 MG/1
0.25 TABLET ORAL NIGHTLY PRN
Qty: 10 TABLET | Refills: 0 | Status: SHIPPED | OUTPATIENT
Start: 2024-04-26

## 2024-04-26 RX ORDER — SERTRALINE HYDROCHLORIDE 100 MG/1
100 TABLET, FILM COATED ORAL DAILY
Qty: 90 TABLET | Refills: 3 | Status: SHIPPED | OUTPATIENT
Start: 2024-04-26

## 2024-04-26 NOTE — PROGRESS NOTES
Chief Complaint   Patient presents with    Physical       HPI:    Patient here for CPE  Due to see gyne back for IUD check and exam, she says she will make appointment soon  Due for mammogram- would like referral today.  utd on pap  C/o chronic right hip/right SI joint pain, worse at night. She was referred for PT in the past, she wants to see pain service, not taking any medication for it.   Anxiety controlled on sertraline, does not use xanax but wants refill in case.  Weight went back up to 187 lbs from 170s, follows with Dr. Dillon. Admits to not exercising, overall balanced diet.  Migraines are controlled, follows with neurology.     Review of Systems   Constitutional: Negative for fever  HENT: Negative for hearing loss, congestion  Eyes: Negative for pain and visual disturbance.   Respiratory: Negative for cough, chest tightness  Cardiovascular: Negative for chest pain  Gastrointestinal: Negative for nausea, vomiting  Genitourinary: Negative for dysuria, hematuria   Skin: Negative for color change and rash.   Neurological: Negative for dizziness, syncope  Hematological: Negative for adenopathy.   Psychiatric/Behavioral: No depression.    Patient Active Problem List   Diagnosis    Other abnormal blood chemistry    Migraine    Lumbago    Urinary tract infection, site not specified    Urinary frequency    Palpitations    Screening for thyroid disorder    Screening for lipoid disorders    Iron deficiency anemia, unspecified    Supervision of other normal pregnancy, antepartum (HCC)    Pregnancy (HCC)    Pregnant (HCC)    Anxiety    Memory loss    Arrhythmia       Past Medical History:    Anxiety    Zoloft daily    Arrhythmia    chronic pvc    Depression    Migraines    PVCs (premature ventricular contractions)    Chronic; Pt currently taking Toprol XL daily    Rotator cuff tear    Left shoulder     Past Surgical History:   Procedure Laterality Date    Anesth, section  2017    Oral surgery procedure       [place] teeth extraction - no complications    Upper gi endoscopy,exam      EGD     Family History   Problem Relation Age of Onset    Bipolar Disorder Father     Depression Father     Diabetes Father     Alcohol and Other Disorders Associated Father     Hypertension Father     Other (Alcoholism) Father     Arthritis Maternal Grandfather     Diabetes Maternal Grandmother     Stroke Maternal Grandmother     Other (Elevated C-reaction Protein) Sister     Hypertension Mother     Thyroid Disorder Mother     Other (Other) Mother     Lung Disorder Paternal Grandfather     Other (Pulmonary Fibrosis) Paternal Grandfather     Arthritis Paternal Grandmother     Other (Endometriosis) Sister     Other (Rectal Prolapse) Sister      Social History     Socioeconomic History    Marital status:    Tobacco Use    Smoking status: Former     Current packs/day: 0.00     Types: Cigarettes     Start date: 2003     Quit date: 2013     Years since quittin.0    Smokeless tobacco: Never    Tobacco comments:     13   Vaping Use    Vaping status: Never Used   Substance and Sexual Activity    Alcohol use: Yes     Alcohol/week: 1.0 standard drink of alcohol     Types: 1 Standard drinks or equivalent per week     Comment: Cage done 20    Drug use: No    Sexual activity: Yes   Other Topics Concern    Caffeine Concern Yes    Stress Concern No    Weight Concern No    Special Diet No    Exercise Yes    Seat Belt Yes       Current Outpatient Medications   Medication Sig Dispense Refill    sertraline 100 MG Oral Tab Take 1 tablet (100 mg total) by mouth daily. 90 tablet 3    ALPRAZolam 0.25 MG Oral Tab Take 1 tablet (0.25 mg total) by mouth nightly as needed for Anxiety. 10 tablet 0    carvedilol 3.125 MG Oral Tab Take 1 tablet (3.125 mg total) by mouth 2 (two) times daily with meals. 180 tablet 2    Atogepant (QULIPTA) 60 MG Oral Tab Take 1 tablet by mouth daily. 60 tablet 5    Eletriptan Hydrobromide 40 MG Oral Tab  TAKE 1 TABLET BY MOUTH AT ONSET AND MAY REPEAT IF NEEDED AFTER 2 HOURS FOR A MAX OF 2 PILLS IN 24 HOURS 10 tablet 3    Docusate Sodium (COLACE OR) Take by mouth.      levonorgestrel 20 MCG/24HR Intrauterine IUD 20 mcg (1 each total) by Intrauterine route once.      neomycin-polymyxin-dexameth 0.1 % Ophthalmic Suspension Place 1 drop into the right eye in the morning and 1 drop before bedtime. (Patient not taking: Reported on 4/26/2024) 5 mL 0    methylPREDNISolone (MEDROL) 4 MG Oral Tablet Therapy Pack Take as directed (Patient not taking: Reported on 4/26/2024) 1 each 0    ondansetron 4 MG Oral Tablet Dispersible Take 1 tablet (4 mg total) by mouth every 8 (eight) hours as needed for Nausea. (Patient not taking: Reported on 4/26/2024) 20 tablet 1    Tirzepatide 5 MG/0.5ML Subcutaneous Solution Pen-injector Inject 5 mg into the skin once a week. ZEPBOUND (Patient not taking: Reported on 4/26/2024) 2 mL 2    Tirzepatide-Weight Management (ZEPBOUND) 5 MG/0.5ML Subcutaneous Solution Auto-injector Inject 5 mg into the skin once a week. (Patient not taking: Reported on 4/26/2024) 2 mL 0       Allergies  No Known Allergies    Health Maintenance  Immunizations:  Immunization History   Administered Date(s) Administered    >=3 YRS TRI  MULTIDOSE VIAL (90422) FLU CLINIC 10/18/2023    Covid-19 Vaccine Pfizer 30 mcg/0.3 ml 12/18/2020, 01/08/2021, 02/01/2022    Covid-19 Vaccine Pfizer Bivalent 30mcg/0.3mL 11/04/2022    FLULAVAL 6 months & older 0.5 ml Prefilled syringe (94149) 11/04/2022    Influenza 10/10/2017, 10/07/2019, 10/07/2021    TDAP 03/17/2014, 02/16/2017         Physical Exam  BP 98/60   Pulse 80   Temp 97.3 °F (36.3 °C)   Ht 5' 7.72\" (1.72 m)   Wt 187 lb (84.8 kg)   SpO2 99%   BMI 28.67 kg/m²   Constitutional: Oriented to person, place, and time. No distress.   HEENT:  Normocephalic and atraumatic. Hearing and tympanic membranes normal.   Eyes: Conjunctivae and EOM are normal. PERRLA. No scleral icterus.    Neck: Normal range of motion. Neck supple.   Breasts: no masses or lumps, no LAD  Cardiovascular: Normal rate, regular rhythm and intact distal pulses.    Pulmonary/Chest: Effort normal and breath sounds normal.   Abdominal: Soft. Bowel sounds are normal. Non tender, ND  EXT: right hip TTP, normal ROM, R SI joint  Neurological: No cranial nerve deficit or sensory deficit. Normal muscle tone.   Skin: Skin is warm and dry.   Psychiatric: Normal mood and affect.     A/P:    Encounter Diagnoses   Name     Routine general medical examination at a health care facility- she has IUD, stressed importance of f/u with her gyne for IUD check and exam, referred for mammogram. Encouraged to start exercising and to continue heart healthy diet.      Anxiety- controlled, CPM     Encounter for screening mammogram for malignant neoplasm of breast     Hip pain, chronic, right- check xray, otc nsaids prn, consider PT     Chronic right SI joint pain- referred to pain service    Migraines- controlled, managed by neurology  Overweight/weight gain- pt going to [facility]    No orders of the defined types were placed in this encounter.      Meds & Refills for this Visit:  Requested Prescriptions     Signed Prescriptions Disp Refills    sertraline 100 MG Oral Tab 90 tablet 3     Sig: Take 1 tablet (100 mg total) by mouth daily.    ALPRAZolam 0.25 MG Oral Tab 10 tablet 0     Sig: Take 1 tablet (0.25 mg total) by mouth nightly as needed for Anxiety.       Imaging & Consults:  OP REFERRAL PAIN MANGEMENT  [facility] MICHAEL 2D+3D SCREENING BILAT (CPT=77067/21941)      Return in about 1 year (around 4/26/2025), or if symptoms worsen or fail to improve, for physical.    There are no Patient Instructions on file for this visit.    All questions were answered and the patient understands the plan.

## 2024-05-16 ENCOUNTER — HOSPITAL ENCOUNTER (OUTPATIENT)
Dept: MAMMOGRAPHY | Age: 44
Discharge: HOME | End: 2024-05-16
Attending: INTERNAL MEDICINE

## 2024-05-16 DIAGNOSIS — Z12.31 ENCOUNTER FOR SCREENING MAMMOGRAM FOR MALIGNANT NEOPLASM OF BREAST: ICD-10-CM

## 2024-05-16 PROCEDURE — 77063 BREAST TOMOSYNTHESIS BI: CPT | Performed by: INTERNAL MEDICINE

## 2024-05-16 PROCEDURE — 77067 SCR MAMMO BI INCL CAD: CPT | Performed by: INTERNAL MEDICINE

## 2024-06-11 ENCOUNTER — HOSPITAL ENCOUNTER (OUTPATIENT)
Dept: MAMMOGRAPHY | Facility: HOSPITAL | Age: 44
Discharge: HOME | End: 2024-06-11
Attending: INTERNAL MEDICINE
Payer: COMMERCIAL

## 2024-06-11 DIAGNOSIS — R92.2 INCONCLUSIVE MAMMOGRAM: ICD-10-CM

## 2024-06-11 PROCEDURE — 77065 DX MAMMO INCL CAD UNI: CPT | Performed by: INTERNAL MEDICINE

## 2024-06-11 PROCEDURE — 77061 BREAST TOMOSYNTHESIS UNI: CPT | Performed by: INTERNAL MEDICINE

## 2024-07-22 ENCOUNTER — TELEMEDICINE (OUTPATIENT)
Dept: INTERNAL MEDICINE CLINIC | Facility: CLINIC | Age: 44
End: 2024-07-22
Payer: COMMERCIAL

## 2024-07-22 DIAGNOSIS — Z51.81 THERAPEUTIC DRUG MONITORING: Primary | ICD-10-CM

## 2024-07-22 DIAGNOSIS — E66.9 OBESITY (BMI 30-39.9): ICD-10-CM

## 2024-07-22 PROCEDURE — 99213 OFFICE O/P EST LOW 20 MIN: CPT | Performed by: INTERNAL MEDICINE

## 2024-07-22 RX ORDER — TIRZEPATIDE 10 MG/.5ML
10 INJECTION, SOLUTION SUBCUTANEOUS WEEKLY
Qty: 2 ML | Refills: 0 | Status: SHIPPED | OUTPATIENT
Start: 2024-07-22

## 2024-07-22 RX ORDER — TIRZEPATIDE 7.5 MG/.5ML
7.5 INJECTION, SOLUTION SUBCUTANEOUS WEEKLY
Qty: 2 ML | Refills: 0 | Status: SHIPPED | OUTPATIENT
Start: 2024-07-22 | End: 2024-08-13

## 2024-07-22 NOTE — PROGRESS NOTES
HISTORY OF PRESENT ILLNESS  Chief Complaint   Patient presents with    Weight Check     Video         Lisa Martin is a 43 year old female here for follow up in medical weight loss program.     Denies chest pain, shortness of breath, dizziness, blurred vision, headache, paresthesia, nausea/vomiting.     Having a good summer overall.   Work has been busy and lots of new nurses   Struggling with medication shortages and has not been able to get her medication   Was not on a weekly dose structure    Recent weight : 190 lb   Tolerating medicaiton well         Wt Readings from Last 6 Encounters:   04/26/24 187 lb (84.8 kg)   02/29/24 191 lb 9.3 oz (86.9 kg)   02/22/24 194 lb (88 kg)   11/15/23 198 lb (89.8 kg)   07/26/23 166 lb (75.3 kg)   02/22/23 166 lb (75.3 kg)            Breakfast Lunch Dinner Snacks Fluids   reviewed           REVIEW OF SYSTEMS  GENERAL HEALTH: feels well otherwise, denied any fevers chills or night sweats   RESPIRATORY: denies shortness of breath   CARDIOVASCULAR: denies chest pain  GI: denies abdominal pain    EXAM  There were no vitals taken for this visit.  GENERAL: well developed, well nourished,in no apparent distress, A/O x3  SKIN: no rashes,no suspicious lesions  HEENT: atraumatic, normocephalic, OP-clear, PERRL  NECK: supple,no adenopathy  LUNGS: clear to auscultation bilaterally   CARDIO: RRR without murmur  GI: good BS's,NT/ND, no masses or HSM  EXTREMITIES: no cyanosis, no clubbing, no edema        Lab Results   Component Value Date    WBC 6.8 04/23/2024    RBC 4.28 04/23/2024    HGB 14.1 04/23/2024    HCT 39.3 04/23/2024    MCV 91.8 04/23/2024    MCH 32.9 04/23/2024    MCHC 35.9 04/23/2024    RDW 12.4 04/23/2024    .0 04/23/2024     Lab Results   Component Value Date    GLU 96 04/23/2024    BUN 10 04/23/2024    BUNCREA 17.9 07/13/2021    CREATSERUM 0.72 04/23/2024    ANIONGAP 4 04/23/2024    GFRNAA 112 07/13/2021    GFRAA 129 07/13/2021    CA 8.9 04/23/2024    OSMOCALC  289 04/23/2024    ALKPHO 69 04/23/2024    AST 7 (L) 04/23/2024    ALT 19 04/23/2024    BILT 1.3 04/23/2024    TP 7.2 04/23/2024    ALB 3.9 04/23/2024    GLOBULIN 3.3 04/23/2024    AGRATIO 1.8 06/26/2012     04/23/2024    K 4.3 04/23/2024     04/23/2024    CO2 27.0 04/23/2024     No results found for: \"EAG\", \"A1C\"  Lab Results   Component Value Date    CHOLEST 221 (H) 04/23/2024    TRIG 80 04/23/2024    HDL 65 (H) 04/23/2024     (H) 04/23/2024    VLDL 15 04/23/2024    TCHDLRATIO 2.8 03/16/2011    NONHDLC 156 (H) 04/23/2024     Lab Results   Component Value Date    T4F 0.9 07/13/2021    TSH 1.020 04/23/2024     Lab Results   Component Value Date    B12 565 07/13/2021     Lab Results   Component Value Date    VITD 38.3 07/13/2021       Current Outpatient Medications on File Prior to Visit   Medication Sig Dispense Refill    ondansetron 4 MG Oral Tablet Dispersible Take 1 tablet (4 mg total) by mouth every 8 (eight) hours as needed for Nausea.      sertraline 100 MG Oral Tab Take 1 tablet (100 mg total) by mouth daily. 90 tablet 3    ALPRAZolam 0.25 MG Oral Tab Take 1 tablet (0.25 mg total) by mouth nightly as needed for Anxiety. 10 tablet 0    carvedilol 3.125 MG Oral Tab Take 1 tablet (3.125 mg total) by mouth 2 (two) times daily with meals. 180 tablet 2    Atogepant (QULIPTA) 60 MG Oral Tab Take 1 tablet by mouth daily. 60 tablet 5    Eletriptan Hydrobromide 40 MG Oral Tab TAKE 1 TABLET BY MOUTH AT ONSET AND MAY REPEAT IF NEEDED AFTER 2 HOURS FOR A MAX OF 2 PILLS IN 24 HOURS 10 tablet 3    Docusate Sodium (COLACE OR) Take by mouth.      levonorgestrel 20 MCG/24HR Intrauterine IUD 20 mcg (1 each total) by Intrauterine route once.       No current facility-administered medications on file prior to visit.       ASSESSMENT  Analyzed weight data:       Diagnoses and all orders for this visit:    Therapeutic drug monitoring    Obesity (BMI 30-39.9)    Other orders  -     Tirzepatide-Weight Management  (ZEPBOUND) 7.5 MG/0.5ML Subcutaneous Solution Auto-injector; Inject 7.5 mg into the skin once a week for 4 doses.  -     Tirzepatide-Weight Management (ZEPBOUND) 10 MG/0.5ML Subcutaneous Solution Auto-injector; Inject 10 mg into the skin once a week.            PLAN  Initial consult: 207 lb on 6/3/19   Resume zepbound titration   -advised of side effects and adverse effects of this medication  Reviewed shortages and goal for more medication consistency   Continue associated lifestyle changes    Reviewed dose increase as tolerated and response curve  Hold on vyvanse at this time.   Reviewed possible increase In migraines since starting, will continue to monitor.  Nutrition: low carb diet/ recommended to eat breakfast daily/ regular protein intake  Medication use and side effects reviewed with patient.  Medication contraindications: n/a  Follow up with dietitian and psychologist as recommended.  Discussed the role of sleep and stress in weight management.  Counseled on comprehensive weight loss plan including attention to nutrition, exercise and behavior/stress management for success. See patient instruction below for more details.  Discussed strategies to overcome barriers to successful weight loss and weight maintenance  FITTE: ACSM recommendations (150-300 minutes/ week in active weight loss)   Weight Loss consent to treat reviewed and signed     There are no Patient Instructions on file for this visit.    Return in about 8 weeks (around 9/16/2024).    Patient verbalizes understanding.    Sheri Dillon MD

## 2024-08-13 ENCOUNTER — TELEPHONE (OUTPATIENT)
Facility: CLINIC | Age: 44
End: 2024-08-13

## 2024-08-13 NOTE — TELEPHONE ENCOUNTER
Family Medical Leave Act forms received via [employer] message on 8/7/24. Valid authorization for [employer] on file signed on 2/1/24. Logged for processing.

## 2024-08-16 NOTE — TELEPHONE ENCOUNTER
Called patient X2 to obtain details for Family Medical Leave Act. Left voicemail to call [phone] Placed forms in [facility] ON HOLD folder    Type of Leave:   Reason for Leave:  Start date of leave:  How much time needed?:   Forms Due Date:  Was Fee and Turnaround info Given?:

## 2024-08-21 NOTE — TELEPHONE ENCOUNTER
Dr Roland,    Patient is requesting for re certification on intermittent FMLA due to Migraines, 1 flare up per month each episode lasting 1 day. Do you support?    Thanks,    Jazmin

## 2024-08-21 NOTE — TELEPHONE ENCOUNTER
Dr. Roland,    Please sign off on form if you agree to:   Intermittent Family Medical Leave Act due to migraines   1 flare ups per month each lasting 1 day    -From your Inbasket, Highlight the patient and click Chart   -Double click the 8/13/24 Forms Completion telephone encounter  -Scroll down to the Media section   -Click the blue Hyperlink: JASSI Roland 8/21/24  -Click Acknowledge located in the top right ribbon/menu   -Drag the mouse into the blank space of the document and a + sign will appear. Left click to   electronically sign the document.     Thank you,    Lana FINLEY

## 2024-08-21 NOTE — TELEPHONE ENCOUNTER
Patient called back says forms are the same as last year- advised patient forms may not be signed by MD and will task him to see if he is ok with re certifying forms. Advised to schedule an appt with MD     Type of Leave: Intermittent  Reason for Leave: Migraines  Start date of leave: 8/1/24 - 02/01/25   How much time needed?: 1 flare up per month each episode lasting 1 day  Forms Due Date:  Was Fee and Turnaround info Given?:

## 2024-08-26 NOTE — TELEPHONE ENCOUNTER
Family Medical Leave Act completed and faxed to [employer] [phone]. Sent patient MyChart message

## 2024-08-28 ENCOUNTER — OFFICE VISIT (OUTPATIENT)
Dept: PAIN CLINIC | Facility: CLINIC | Age: 44
End: 2024-08-28
Payer: COMMERCIAL

## 2024-08-28 VITALS — OXYGEN SATURATION: 99 % | HEART RATE: 87 BPM | SYSTOLIC BLOOD PRESSURE: 92 MMHG | DIASTOLIC BLOOD PRESSURE: 64 MMHG

## 2024-08-28 DIAGNOSIS — M25.551 PAIN OF RIGHT HIP: Primary | ICD-10-CM

## 2024-08-28 PROCEDURE — 99203 OFFICE O/P NEW LOW 30 MIN: CPT | Performed by: ANESTHESIOLOGY

## 2024-08-28 NOTE — PATIENT INSTRUCTIONS
Refill policies:    Allow 2-3 business days for refills; controlled substances may take longer.  Contact your pharmacy at least 5 days prior to running out of medication and have them send an electronic request or submit request through the “request refill” option in your [employer] account.  Refills are not addressed on weekends; covering physicians do not authorize routine medications on weekends.  No narcotics or controlled substances are refilled after noon on Fridays or by on call physicians.  By law, narcotics must be electronically prescribed.  A 30 day supply with no refills is the maximum allowed.  If your prescription is due for a refill, you may be due for a follow up appointment.  To best provide you care, patients receiving routine medications need to be seen at least once a year.  Patients receiving narcotic/controlled substance medications need to be seen at least once every 3 months.  In the event that your preferred pharmacy does not have the requested medication in stock (e.g. Backordered), it is your responsibility to find another pharmacy that has the requested medication available.  We will gladly send a new prescription to that pharmacy at your request.    Scheduling Tests:    If your physician has ordered radiology tests such as MRI or CT scans, please contact Central Scheduling at [phone] right away to schedule the test.  Once scheduled, the [facility] Centralized Referral Team will work with your insurance carrier to obtain pre-certification or prior authorization.  Depending on your insurance carrier, approval may take 3-10 days.  It is highly recommended patients assure they have received an authorization before having a test performed.  If test is done without insurance authorization, patient may be responsible for the entire amount billed.      Precertification and Prior Authorizations:  If your physician has recommended that you have a procedure or additional testing performed the [facility]  Centralized Referral Team will contact your insurance carrier to obtain pre-certification or prior authorization.    You are strongly encouraged to contact your insurance carrier to verify that your procedure/test has been approved and is a COVERED benefit.  Although the [facility] Centralized Referral Team does its due diligence, the insurance carrier gives the disclaimer that \"Although the procedure is authorized, this does not guarantee payment.\"    Ultimately the patient is responsible for payment.   Thank you for your understanding in this matter.  Paperwork Completion:  If you require FMLA or disability paperwork for your recovery, please make sure to either drop it off or have it faxed to our office at [phone]. Be sure the form has your name and date of birth on it.  The form will be faxed to our Forms Department and they will complete it for you.  There is a 25$ fee for all forms that need to be filled out.  Please be aware there is a 10-14 day turnaround time.  You will need to sign a release of information (ERNIE) form if your paperwork does not come with one.  You may call the Forms Department with any questions at [phone].  Their fax number is [phone].

## 2024-08-28 NOTE — PROGRESS NOTES
Location of Pain: right hip    Date Pain Began: 2019          Work Related:   No        Receiving Work Comp/Disability:   No    Numeric Rating Scale:  Pain at Present:  0                                                                                                            (No Pain) 0  to  10 (Worst Pain)                 Minimum Pain:   0  Maximum Pain  6    Distribution of Pain:    right    Quality of Pain:    sharp, dull    Origin of Pain:    Other uknown    Aggravating Factors:    Other Laying Down    Past Treatments for Current Pain Condition:   Other Home PT, Medication    Prior diagnostic testing for your pain:  Xray

## 2024-08-28 NOTE — H&P
Name: Lisa Martin   : 1980   DOS: 2024     Chief complaint: Right hip pain    History of present illness:  Lisa Martin is a 43 year old female with a longstanding history of right hip pain here for evaluation.  From a symptom standpoint, patient complains of pain in the posterior aspect of the hip joint and gluteal region.  This is sharp and generally made worse by laying down.  The discomfort will wake her up at night.  Has completed physical therapy and medication management with nonsteroidals without improvement.  Rates the pain a 6 out of 10 with exacerbation to 9 out of 10.    She denies any chills, fever or weakness. She denies any bladder or bowel incontinence.      Past Medical History:    Anxiety    Zoloft daily    Arrhythmia    chronic pvc    Depression    Migraines    PVCs (premature ventricular contractions)    Chronic; Pt currently taking Toprol XL daily    Rotator cuff tear    Left shoulder      Current Outpatient Medications   Medication Sig Dispense Refill    Tirzepatide-Weight Management (ZEPBOUND) 10 MG/0.5ML Subcutaneous Solution Auto-injector Inject 10 mg into the skin once a week. 2 mL 0    sertraline 100 MG Oral Tab Take 1 tablet (100 mg total) by mouth daily. 90 tablet 3    ALPRAZolam 0.25 MG Oral Tab Take 1 tablet (0.25 mg total) by mouth nightly as needed for Anxiety. 10 tablet 0    ondansetron 4 MG Oral Tablet Dispersible Take 1 tablet (4 mg total) by mouth every 8 (eight) hours as needed for Nausea.      carvedilol 3.125 MG Oral Tab Take 1 tablet (3.125 mg total) by mouth 2 (two) times daily with meals. 180 tablet 2    Atogepant (QULIPTA) 60 MG Oral Tab Take 1 tablet by mouth daily. 60 tablet 5    Eletriptan Hydrobromide 40 MG Oral Tab TAKE 1 TABLET BY MOUTH AT ONSET AND MAY REPEAT IF NEEDED AFTER 2 HOURS FOR A MAX OF 2 PILLS IN 24 HOURS 10 tablet 3    Docusate Sodium (COLACE OR) Take by mouth.      levonorgestrel 20 MCG/24HR Intrauterine IUD 20 mcg (1 each  total) by Intrauterine route once.       Past Surgical History:   Procedure Laterality Date    Anesth, section  2017    Oral surgery procedure      [place] teeth extraction - no complications    Upper gi endoscopy,exam      EGD      Family History   Problem Relation Age of Onset    Bipolar Disorder Father     Depression Father     Diabetes Father     Alcohol and Other Disorders Associated Father     Hypertension Father     Other (Alcoholism) Father     Arthritis Maternal Grandfather     Diabetes Maternal Grandmother     Stroke Maternal Grandmother     Other (Elevated C-reaction Protein) Sister     Hypertension Mother     Thyroid Disorder Mother     Other (Other) Mother     Lung Disorder Paternal Grandfather     Other (Pulmonary Fibrosis) Paternal Grandfather     Arthritis Paternal Grandmother     Other (Endometriosis) Sister     Other (Rectal Prolapse) Sister      Social History     Socioeconomic History    Marital status:    Tobacco Use    Smoking status: Former     Current packs/day: 0.00     Types: Cigarettes     Start date: 2003     Quit date: 2013     Years since quittin.3    Smokeless tobacco: Never    Tobacco comments:     13   Vaping Use    Vaping status: Never Used   Substance and Sexual Activity    Alcohol use: Yes     Alcohol/week: 1.0 standard drink of alcohol     Types: 1 Standard drinks or equivalent per week     Comment: Cage done 20    Drug use: No    Sexual activity: Yes   Other Topics Concern    Caffeine Concern Yes    Stress Concern No    Weight Concern No    Special Diet No    Exercise Yes    Seat Belt Yes       Review of  other systems:  10 point ROS otherwise negative    Physical examination: Lisa is a 43 year old female not in acute distress  BP 92/64 (BP Location: Left arm, Patient Position: Sitting)   Pulse 87   SpO2 99%    The patient is awake, alert, oriented and corporative. She has a normal affect. The patient ambulates with normal  gait.  HEENT: No gross lesion noted. PEERL. No icterus.  Neck and Upper Extremity: Supple. No thyromegaly or lymphadenopathy.   Motor Examination:    (R)   (L)  Deltoid:      5    5  Biceps:       5    5  Triceps:      5    5  Wrist Extension:     5    5  Wrist Flexsion:     5    5  Finger Extension:     5    5  Finger Flexsion:     5    5     Cardiovascular system: Regular rate and rhythm.    Respiratory system: Breath sounds equal bilaterally.    Abdomen: Soft   Neurologic:  Cranial nerves II through XII are grossly intact.       Examination of the lower back:   Motor Examination:   (R)   (L)   Hip Abduction:   5    5   Hip Flexion:    5    5   Knee Extension:   5    5   Knee Flexion:    5    5   Ant. Tibialis:    5    5  Extensor Hallucis Longus:   5    5  Peroneals:     5    5  Gastrocsoleus:     5    5       Radiology diagnostic studies:   Hip x-ray with preserved joint spaces    Assessment:  Encounter Diagnosis   Name Primary?    Pain of right hip Yes   .      Plan:     The patient is a 43-year-old female with loss of a history of right-sided hip pain.  The patient has completed physical therapy and nonsteroidals without improvement.  As x-ray with well-preserved joint spaces.  Discussed possible differential diagnosis including lumbar radiculitis versus labral tear versus hip impingement.  Given failure conservative management, recommend updating advanced imaging.  Hip MRI ordered.  Patient to follow-up after hip MRIs completed.    Tony Brannon MD MPH  Pain Management

## 2024-09-16 ENCOUNTER — TELEPHONE (OUTPATIENT)
Dept: NEUROLOGY | Facility: CLINIC | Age: 44
End: 2024-09-16

## 2024-09-16 NOTE — TELEPHONE ENCOUNTER
[employer]  fax FMLA )   Forwarded to forms department copy of form scanned and forwarded to forms department by outlook, original sent by inter company mail.  No fee collected.

## 2024-09-19 DIAGNOSIS — G43.109 MIGRAINE WITH AURA AND WITHOUT STATUS MIGRAINOSUS, NOT INTRACTABLE: ICD-10-CM

## 2024-09-19 RX ORDER — ATOGEPANT 60 MG/1
1 TABLET ORAL DAILY
Qty: 60 TABLET | Refills: 1 | Status: SHIPPED | OUTPATIENT
Start: 2024-09-19

## 2024-09-19 NOTE — TELEPHONE ENCOUNTER
Medication: Qlipta 60mg     Date of last refill: 2/29/24 (#60/5)  [place]      Last office visit: 2/29/24  Due back to clinic per last office note:  3m  Date next office visit scheduled:    Future Appointments   Date Time Provider Department Center   10/4/2024 11:15 AM PF MRI RM1 (1.5T) PF MRI [place]   10/24/2024  9:40 AM Sheri Dillon MD [facility] [address]   11/5/2024  3:20 PM Shayne Roland DO [facility] [id number]           Last OV note recommendation:    1. Migraine  - Continue Qulipta 60mg daily  - Continue relpax for breakthrough

## 2024-10-04 ENCOUNTER — HOSPITAL ENCOUNTER (OUTPATIENT)
Dept: MRI IMAGING | Age: 44
Discharge: HOME | End: 2024-10-04
Attending: ANESTHESIOLOGY
Payer: COMMERCIAL

## 2024-10-04 DIAGNOSIS — M25.551 PAIN OF RIGHT HIP: ICD-10-CM

## 2024-10-04 PROCEDURE — 73721 MRI JNT OF LWR EXTRE W/O DYE: CPT | Performed by: ANESTHESIOLOGY

## 2024-10-08 ENCOUNTER — OFFICE VISIT (OUTPATIENT)
Dept: NEUROLOGY | Facility: CLINIC | Age: 44
End: 2024-10-08
Payer: COMMERCIAL

## 2024-10-08 VITALS
SYSTOLIC BLOOD PRESSURE: 109 MMHG | HEART RATE: 68 BPM | WEIGHT: 180.75 LBS | BODY MASS INDEX: 28 KG/M2 | RESPIRATION RATE: 16 BRPM | DIASTOLIC BLOOD PRESSURE: 68 MMHG

## 2024-10-08 DIAGNOSIS — G43.109 MIGRAINE WITH AURA AND WITHOUT STATUS MIGRAINOSUS, NOT INTRACTABLE: ICD-10-CM

## 2024-10-08 PROCEDURE — 99213 OFFICE O/P EST LOW 20 MIN: CPT | Performed by: OTHER

## 2024-10-08 RX ORDER — CYCLOBENZAPRINE HCL 10 MG
1 TABLET ORAL 3 TIMES DAILY PRN
COMMUNITY
Start: 2024-10-04

## 2024-10-08 RX ORDER — ATOGEPANT 60 MG/1
1 TABLET ORAL DAILY
Qty: 90 TABLET | Refills: 1 | Status: SHIPPED | OUTPATIENT
Start: 2024-10-08

## 2024-10-08 NOTE — PATIENT INSTRUCTIONS
After your visit at the [facility] today,  please direct any follow up questions or medication needs to the staff in our [place] office so that your concerns may be promptly addressed.  We are available through [employer] or at the numbers below:    The phone number is:   [phone] option #1    The fax number is:  [phone]    Your pharmacy should also send any requests electronically to the [place] office.  Refill policies:    Allow 2-3 business days for refills; controlled substances may take longer.  Contact your pharmacy at least 5 days prior to running out of medication and have them send an electronic request or submit request through the “request refill” option in your [employer] account.  Refills are not addressed on weekends; covering physicians do not authorize routine medications on weekends.  No narcotics or controlled substances are refilled after noon on Fridays or by on call physicians.  By law, narcotics must be electronically prescribed.  A 30 day supply with no refills is the maximum allowed.  If your prescription is due for a refill, you may be due for a follow up appointment.  To best provide you care, patients receiving routine medications need to be seen at least once a year.  Patients receiving narcotic/controlled substance medications need to be seen at least once every 3 months.  In the event that your preferred pharmacy does not have the requested medication in stock (e.g. Backordered), it is your responsibility to find another pharmacy that has the requested medication available.  We will gladly send a new prescription to that pharmacy at your request.    Scheduling Tests:    If your physician has ordered radiology tests such as MRI or CT scans, please contact Central Scheduling at [phone] right away to schedule the test.  Once scheduled, the [facility] Centralized Referral Team will work with your insurance carrier to obtain pre-certification or prior authorization.   Depending on your insurance carrier, approval may take 3-10 days.  It is highly recommended patients assure they have received an authorization before having a test performed.  If test is done without insurance authorization, patient may be responsible for the entire amount billed.      Precertification and Prior Authorizations:  If your physician has recommended that you have a procedure or additional testing performed the [facility] Centralized Referral Team will contact your insurance carrier to obtain pre-certification or prior authorization.    You are strongly encouraged to contact your insurance carrier to verify that your procedure/test has been approved and is a COVERED benefit.  Although the [facility] Centralized Referral Team does its due diligence, the insurance carrier gives the disclaimer that \"Although the procedure is authorized, this does not guarantee payment.\"    Ultimately the patient is responsible for payment.   Thank you for your understanding in this matter.  Paperwork Completion:  If you require FMLA or disability paperwork for your recovery, please make sure to either drop it off or have it faxed to our office at [phone]. Be sure the form has your name and date of birth on it.  The form will be faxed to our Forms Department and they will complete it for you.  There is a 25$ fee for all forms that need to be filled out.  Please be aware there is a 10-14 day turnaround time.  You will need to sign a release of information (ERNIE) form if your paperwork does not come with one.  You may call the Forms Department with any questions at [phone].  Their fax number is [phone].

## 2024-10-08 NOTE — PROGRESS NOTES
Neurology H&P    Lisa Martin Patient Status:  No patient class for patient encounter    1980 MRN [id number]   Location [facility] Attending No att. providers found   Hosp Day # 0 PCP Daily Urena MD     Subjective:  Initial Clinic HPI 22  Lisa Martin is a(n) 44 year old female with a PMH significant for migraines. She has previously seen my colleague Dr. Sorto and Dr. Talavera. She is currently taking Topamax 50mg pm for migraine prophylaxsis. She states that she only gets 2-3 migraines per year. She takes relpax for migraine relief and she reports that this works well to help abort her migraines if she takes it in time. She has only had one migraine in the past 6 months. Her migraines are preceded by a visual aura. Wavy lines in her vision for about 20 minutes prior to the migraine. She is on an estrogen containing BC product as well.     Interim History:   Pt was last seen in the clinic on 24. Since that time she has been taking Qulipta 60mg daily for her migraines.  She is not getting many migraines 0-1 migraines per month. She is doing well at this time      Current Medications:  Current Outpatient Medications   Medication Sig Dispense Refill    cyclobenzaprine 10 MG Oral Tab Take 1 tablet (10 mg total) by mouth 3 (three) times daily as needed.      Atogepant (QULIPTA) 60 MG Oral Tab Take 1 tablet by mouth daily. 60 tablet 1    Tirzepatide-Weight Management (ZEPBOUND) 10 MG/0.5ML Subcutaneous Solution Auto-injector Inject 10 mg into the skin once a week. 2 mL 0    sertraline 100 MG Oral Tab Take 1 tablet (100 mg total) by mouth daily. 90 tablet 3    ALPRAZolam 0.25 MG Oral Tab Take 1 tablet (0.25 mg total) by mouth nightly as needed for Anxiety. 10 tablet 0    ondansetron 4 MG Oral Tablet Dispersible Take 1 tablet (4 mg total) by mouth every 8 (eight) hours as needed for Nausea.      carvedilol 3.125 MG Oral Tab Take 1 tablet (3.125 mg total) by mouth 2 (two) times  daily with meals. 180 tablet 2    Eletriptan Hydrobromide 40 MG Oral Tab TAKE 1 TABLET BY MOUTH AT ONSET AND MAY REPEAT IF NEEDED AFTER 2 HOURS FOR A MAX OF 2 PILLS IN 24 HOURS 10 tablet 3    Docusate Sodium (COLACE OR) Take by mouth.      levonorgestrel 20 MCG/24HR Intrauterine IUD 20 mcg (1 each total) by Intrauterine route once.         Problem List:  Patient Active Problem List   Diagnosis    Other abnormal blood chemistry    Migraine    Lumbago    Urinary tract infection, site not specified    Urinary frequency    Palpitations    Screening for thyroid disorder    Screening for lipoid disorders    Iron deficiency anemia, unspecified    Supervision of other normal pregnancy, antepartum (HCC)    Pregnancy (HCC)    Pregnant (HCC)    Anxiety    Memory loss    Arrhythmia    Pain of right hip       PMHx:  Past Medical History:    Anxiety    Zoloft daily    Arrhythmia    chronic pvc    Depression    Migraines    PVCs (premature ventricular contractions)    Chronic; Pt currently taking Toprol XL daily    Rotator cuff tear    Left shoulder       PSHx:  Past Surgical History:   Procedure Laterality Date    Anesth, section  2017    Oral surgery procedure      [place] teeth extraction - no complications    Upper gi endoscopy,exam      EGD       SocHx:  Social History     Socioeconomic History    Marital status:    Tobacco Use    Smoking status: Former     Current packs/day: 0.00     Types: Cigarettes     Start date: 2003     Quit date: 2013     Years since quittin.4    Smokeless tobacco: Never    Tobacco comments:     13   Vaping Use    Vaping status: Never Used   Substance and Sexual Activity    Alcohol use: Yes     Alcohol/week: 1.0 standard drink of alcohol     Comment: Socially    Drug use: Never    Sexual activity: Yes   Other Topics Concern    Caffeine Concern Yes    Stress Concern No    Weight Concern No    Special Diet No    Exercise Yes    Seat Belt Yes       Family  History:  Family History   Problem Relation Age of Onset    Bipolar Disorder Father     Depression Father     Diabetes Father     Alcohol and Other Disorders Associated Father     Hypertension Father     Other (Alcoholism) Father     Arthritis Maternal Grandfather     Diabetes Maternal Grandmother     Stroke Maternal Grandmother     Other (Elevated C-reaction Protein) Sister     Hypertension Mother     Thyroid Disorder Mother     Other (Other) Mother     Lung Disorder Paternal Grandfather     Other (Pulmonary Fibrosis) Paternal Grandfather     Arthritis Paternal Grandmother     Other (Endometriosis) Sister     Other (Rectal Prolapse) Sister             ROS:  10 point ROS completed and was negative, except for pertinent positive and negatives stated in subjective.    Objective/Physical Exam:    Vital Signs:  Blood pressure 109/68, pulse 68, resp. rate 16, weight 180 lb 12.4 oz (82 kg), not currently breastfeeding.    Gen: Awake and in no apparent distress  HEENT: moist mucus membranes  Neck: Supple  Cardiovascular: Regular rate and rhythm, no murmur  Pulm: CTAB  GI: non-tender, normal bowel sounds  Skin: normal, dry  Extremities: No clubbing or cyanosis      Neurologic:   MENTAL STATUS: alert, ox3, normal attention, language and fund of knowledge.      CRANIAL NERVES II to XII: PERRLA, no ptosis or diplopia, EOM intact, facial sensation intact, strong eye closure, face is symmetric, no dysarthria, tongue midline,  no tongue fasciculations or atrophy, strong shoulder shrug.    MOTOR EXAMINATION: normal tone, no fasciculations, normal strength throughout in UEs and LEs      SENSORY EXAMINATION:  UE: intact to light touch, pinprick intact  LE: intact to light touch, pinprick intact    COORDINATION:  No dysmetria, or intention tremors     REFLEXES: 2+ at biceps, 2+ brachioradialis, 2+ at patella, 2+ at the ankles     GAIT: normal stance, normal gait    Romberg's: negative        Labs:       Imaging:  MRI Brain 7/2012     FINDINGS:     The midline structures of the brain including the        pituitary have normal appearance.             No acute cortical or brainstem infarct.  No hydrocephalus or        midline shift.                      No enhancing brain mass.                            No significant disease in the visualized paranasal sinuses.                                   CONCLUSION:     Normal exam.              Assessment:  This is a 45 y/o female with a h/o migraines. She started Qulipta after our last visit and this ios working very well for her. Continue Qulipta and relpax for breakthrough.       Plan:  1. Migraine  - Continue Qulipta 60mg daily  - Continue relpax for breakthrough     Shayne Roland, DO  Neurology

## 2024-10-09 ENCOUNTER — TELEPHONE (OUTPATIENT)
Dept: ORTHOPEDICS CLINIC | Facility: CLINIC | Age: 44
End: 2024-10-09

## 2024-10-09 DIAGNOSIS — M25.551 BILATERAL HIP PAIN: Primary | ICD-10-CM

## 2024-10-09 DIAGNOSIS — M25.552 BILATERAL HIP PAIN: Primary | ICD-10-CM

## 2024-10-09 NOTE — TELEPHONE ENCOUNTER
Patient scheduled on [facility] for AMANDA hip pain. Please advise if imaging is needed.  Future Appointments   Date Time Provider Department Center   10/24/2024  9:40 AM Sheri Dillon MD [facility] [address]   11/25/2024 10:40 AM Fidencio Bangura MD [facility] [address]

## 2024-10-10 ENCOUNTER — PATIENT MESSAGE (OUTPATIENT)
Dept: ORTHOPEDICS CLINIC | Facility: CLINIC | Age: 44
End: 2024-10-10

## 2024-10-10 NOTE — TELEPHONE ENCOUNTER
Future Appointments   Date Time Provider Department Center   10/24/2024  9:40 AM Sheri Dillon MD [facility] [address]   11/25/2024 10:10 AM PF XR LL 1 PF XRAY [place]   11/25/2024 10:40 AM Fidencio Bangura MD [facility] [address]     [employer] message sent

## 2024-10-11 ENCOUNTER — PATIENT MESSAGE (OUTPATIENT)
Dept: INTERNAL MEDICINE CLINIC | Facility: CLINIC | Age: 44
End: 2024-10-11

## 2024-10-11 DIAGNOSIS — E66.9 OBESITY (BMI 30-39.9): Primary | ICD-10-CM

## 2024-10-14 ENCOUNTER — TELEPHONE (OUTPATIENT)
Dept: INTERNAL MEDICINE CLINIC | Facility: CLINIC | Age: 44
End: 2024-10-14

## 2024-10-14 RX ORDER — TIRZEPATIDE 10 MG/.5ML
10 INJECTION, SOLUTION SUBCUTANEOUS WEEKLY
Qty: 2 ML | Refills: 1 | Status: SHIPPED | OUTPATIENT
Start: 2024-10-14

## 2024-10-14 NOTE — TELEPHONE ENCOUNTER
Requesting Zepbound 10 mg  LOV: 7/22/24  RTC: 8 weeks  Last Relevant Labs: na  Filled: 7/22/24 #2ml with 0 refills  10 mg last filled 8/13/24 on snapshot            Future Appointments   Date Time Provider Department Center   10/24/2024  9:40 AM Sheri Dillon MD [facility] [address]

## 2024-10-14 NOTE — TELEPHONE ENCOUNTER
Requesting Zepbound 10 mg  LOV: 7/22/24  RTC: 8 weeks  Last Relevant Labs: na  Filled: 7/22/24 #2ml with 0 refills  10 mg last filled 8/13/24 on snapshot    Future Appointments   Date Time Provider Department Center   10/24/2024  9:40 AM Sheri Dillon MD [facility] [address]   11/25/2024 10:10 AM PF XR LL RM1 PF XRAY [place]   11/25/2024 10:40 AM Fidencio Bangura MD [facility] [address]       This was already sent to Dr. Dillon this morning as we got refill request from pharmacy.

## 2024-10-21 ENCOUNTER — TELEPHONE (OUTPATIENT)
Dept: PAIN CLINIC | Facility: CLINIC | Age: 44
End: 2024-10-21

## 2024-10-21 DIAGNOSIS — M25.551 PAIN OF RIGHT HIP: Primary | ICD-10-CM

## 2024-10-22 ENCOUNTER — TELEPHONE (OUTPATIENT)
Dept: PAIN CLINIC | Facility: CLINIC | Age: 44
End: 2024-10-22

## 2024-10-22 DIAGNOSIS — M25.551 PAIN OF RIGHT HIP: Primary | ICD-10-CM

## 2024-10-22 NOTE — TELEPHONE ENCOUNTER
Patient advised of insurance approval to proceed with injections and is agreeable to scheduling. Patient scheduled for procedure, pre-procedure instructions reviewed. Patient prefers Local sedation. Reviewed sedation instructions including No Fasting & No  Required. Patient has no medications to hold prior to procedure. Patient verbalized understanding of instructions, no further needs at this time.      [facility]  PRE-PROCEDURE INSTRUCTIONS WITHOUT SEDATION    Procedure: Right Hip Injection        Appointment Date: 11/07/2024  Check-In Time: 11:45 AM      Prior to the procedure:  Please update us prior to the procedure if you are experiencing any symptoms of infection such as cough, fever, chills, urinary symptoms, or have recently been prescribed antibiotics, have open wounds, have recently had surgery or dental procedures.    Day of Procedure:  **Drivers will be required for patients who receive prescriptions for Valium.    NO FASTING REQUIRED  Please bring your Insurance Card, Photo ID, List of Current Medications and Referral (if applicable) to your appointment.  Please park in the [facility]ERSage and follow the signs to the \[facility] [place] [facility]\"".  Check in at [facility] ([address] [place] [address]) outpatient registration in the \[facility] [place] [facility]\"".  Please note-No prescriptions will be written by Pain Clinic in [place] on the day of procedure. If you require a refill of medications, please contact the office 48 hours prior to your procedure.  If you have an implanted Spinal Cord or Peripheral Nerve Stimulator: Please remember to turn device off for procedure.        Medication Hold:    Number of days you need to be off for the following medications:    Aggrenox 10 days   Agrylin (Anagrelide) 10 days  Brilinta (Ticagrelor) 7 days  Imbruvica (Ibrutinib) 3 days   Enbrel (Etanercept) 24 hours   Fragmin (Dalteparin) 24 hours   Pletal (Cilostazol) 7 days  Effient (Prasugrel) 7 days  Pradaxa 10  days  Trental 7 days  Eliquis (Apixaban) 3 days  Xarelto (Rivaroxaban) 3 days  Lovenox (Enoxaparin) 24 hours  Aspirin  Greater than 81mg but less than 325mg   5 days  325mg and greater                  7 days  Coumadin       5 days  Procedure may be cancelled if INR is elevated.   Excedrin (with aspirin) 7 days  Plavix (Clopidogrel)                            7 days    NSAIDs: 24 hours preferred      Ibuprofen (Motrin, Advil, Vicoprofen), Naproxen (Naprosyn, Aleve), Piroxcam (Feldene), Meloxicam (Mobic), Oxaprozin (Daypro), Diclofenac (Voltaren), Indomethacin (Indocin), Etodolac (Lodine), Nabumetone (Relafen), Celebrex (Celecoxib)           HERBAL SUPPLEMENTS  5 days preferred  Fish oil, krill oil, Omega-3, Vascepa, Vitamin E, Turmeric, Garlic                       Insurance Authorization:   Most insurances are now requiring a preauthorization for all procedures.  In the event that your insurance does not authorize your procedure within 48 hours of the scheduled date, your procedure will be cancelled and rescheduled to a later date.  Please contact your insurance carrier to determine what your financial responsibility will be for the procedure(s).      Cancellation/Rescheduling Appointment:   In the event you need to cancel or reschedule your appointment, you must notify the office 24 hours prior.    Post-procedure instructions:        Please schedule a follow up visit within 2 to 4 weeks after your last procedure date   Please call our office with any questions or concerns before or after your procedure at  [phone].  If you are a diabetic, please increase the frequency of your glucose monitoring after the procedure as this may cause a temporary increase in your blood sugar.  Contact your primary care physician if your blood sugar rises as you may require some medication adjustment.  It is normal to have increased pain at injection site for up to 3-5 days after procedure, you can use heat or ice (20 minutes on  20 minutes off) for comfort.    **To hear a recorded version of these instructions, please call [phone] and follow the prompts.  **Para escuchar las instrucciones en Español, por favor de llamar el karyn [phone] opción 4.

## 2024-10-22 NOTE — TELEPHONE ENCOUNTER
Prior authorization request completed for: right hip injection   Authorization # no auth needed   Pre-D: no   Exclusions/Restrictions: no  Covered Benefit: no   Authorization dates: n/a  CPT codes approved: 52908,64005,34652  Number of visits/dates of service approved: 1  Physician: carol  Location: [facility]/office (either setting)   Call Ref#: leslee [email or web address] 10/22/24 3:36pm est   Representative Name: leslee   Insurance Carrier: [phone]    Patient can be scheduled. Routed to Navigator.

## 2024-10-22 NOTE — TELEPHONE ENCOUNTER
Patient asked if hip injection can be done as bilateral? As she is having pain on left side as well.   Advised patient that will forward request to  and call her back with recommendations.

## 2024-10-23 NOTE — PROGRESS NOTES
HISTORY OF PRESENT ILLNESS  Chief Complaint   Patient presents with    Weight Check     Down 17 lb       Lisa Martin is a 44 year old female here for follow up in medical weight loss program.     Denies chest pain, shortness of breath, dizziness, blurred vision, headache, paresthesia, nausea/vomiting.     Down 17 lb from previous visit.   Feels that her rate of loss is int he last month   [place] that she had some significant side effects with nausea the first 24 hours.       [facility] Follow Up    General Information  Nutrition Recall  Exercise     Sleep                  Wt Readings from Last 6 Encounters:   10/24/24 177 lb (80.3 kg)   10/08/24 180 lb 12.4 oz (82 kg)   04/26/24 187 lb (84.8 kg)   02/29/24 191 lb 9.3 oz (86.9 kg)   02/22/24 194 lb (88 kg)   11/15/23 198 lb (89.8 kg)            Breakfast Lunch Dinner Snacks Fluids   reviewed           REVIEW OF SYSTEMS  GENERAL HEALTH: feels well otherwise, denied any fevers chills or night sweats   RESPIRATORY: denies shortness of breath   CARDIOVASCULAR: denies chest pain  GI: denies abdominal pain    EXAM  /78   Pulse 84   Resp 16   Ht 5' 8\" (1.727 m)   Wt 177 lb (80.3 kg)   BMI 26.91 kg/m²   GENERAL: well developed, well nourished,in no apparent distress, A/O x3  SKIN: no rashes,no suspicious lesions  HEENT: atraumatic, normocephalic, OP-clear, PERRL  NECK: supple,no adenopathy  LUNGS: clear to auscultation bilaterally   CARDIO: RRR without murmur  GI: good BS's,NT/ND, no masses or HSM  EXTREMITIES: no cyanosis, no clubbing, no edema        Lab Results   Component Value Date    WBC 6.8 04/23/2024    RBC 4.28 04/23/2024    HGB 14.1 04/23/2024    HCT 39.3 04/23/2024    MCV 91.8 04/23/2024    MCH 32.9 04/23/2024    MCHC 35.9 04/23/2024    RDW 12.4 04/23/2024    .0 04/23/2024     Lab Results   Component Value Date    GLU 96 04/23/2024    BUN 10 04/23/2024    BUNCREA 17.9 07/13/2021    CREATSERUM 0.72 04/23/2024    ANIONGAP 4 04/23/2024    GFRNAA  112 07/13/2021    GFRAA 129 07/13/2021    CA 8.9 04/23/2024    OSMOCALC 289 04/23/2024    ALKPHO 69 04/23/2024    AST 7 (L) 04/23/2024    ALT 19 04/23/2024    BILT 1.3 04/23/2024    TP 7.2 04/23/2024    ALB 3.9 04/23/2024    GLOBULIN 3.3 04/23/2024    AGRATIO 1.8 06/26/2012     04/23/2024    K 4.3 04/23/2024     04/23/2024    CO2 27.0 04/23/2024     No results found for: \"EAG\", \"A1C\"  Lab Results   Component Value Date    CHOLEST 221 (H) 04/23/2024    TRIG 80 04/23/2024    HDL 65 (H) 04/23/2024     (H) 04/23/2024    VLDL 15 04/23/2024    TCHDLRATIO 2.8 03/16/2011    NONHDLC 156 (H) 04/23/2024     Lab Results   Component Value Date    T4F 0.9 07/13/2021    TSH 1.020 04/23/2024     Lab Results   Component Value Date    B12 565 07/13/2021     Lab Results   Component Value Date    VITD 38.3 07/13/2021       Current Outpatient Medications on File Prior to Visit   Medication Sig Dispense Refill    ZEPBOUND 10 MG/0.5ML Subcutaneous Solution Auto-injector Inject 10 mg into the skin once a week. 2 mL 1    cyclobenzaprine 10 MG Oral Tab Take 1 tablet (10 mg total) by mouth 3 (three) times daily as needed.      Atogepant (QULIPTA) 60 MG Oral Tab Take 1 tablet by mouth daily. 90 tablet 1    sertraline 100 MG Oral Tab Take 1 tablet (100 mg total) by mouth daily. 90 tablet 3    ALPRAZolam 0.25 MG Oral Tab Take 1 tablet (0.25 mg total) by mouth nightly as needed for Anxiety. 10 tablet 0    ondansetron 4 MG Oral Tablet Dispersible Take 1 tablet (4 mg total) by mouth every 8 (eight) hours as needed for Nausea.      carvedilol 3.125 MG Oral Tab Take 1 tablet (3.125 mg total) by mouth 2 (two) times daily with meals. 180 tablet 2    Eletriptan Hydrobromide 40 MG Oral Tab TAKE 1 TABLET BY MOUTH AT ONSET AND MAY REPEAT IF NEEDED AFTER 2 HOURS FOR A MAX OF 2 PILLS IN 24 HOURS 10 tablet 3    Docusate Sodium (COLACE OR) Take by mouth.      levonorgestrel 20 MCG/24HR Intrauterine IUD 20 mcg (1 each total) by Intrauterine  route once.       No current facility-administered medications on file prior to visit.       ASSESSMENT  Analyzed weight data:  Weight Calculations  Initial Weight: 207 lbs  Initial Weight Date: 05/01/19  Today's Weight: 177 lbs  5% Goal: 10.35  10% Goal: 20.7  Total Weight Loss: 30 lbs    Diagnoses and all orders for this visit:    Therapeutic drug monitoring    Obesity (BMI 30-39.9)    Binge eating    Other orders  -     Tirzepatide-Weight Management (ZEPBOUND) 12.5 MG/0.5ML Subcutaneous Solution Auto-injector; Inject 12.5 mg into the skin once a week for 4 doses.              PLAN  Initial consult: 207 lb on 6/3/19   Increase  zepbound 12.5 mg with dose titration to 15 mg q weekly   -advised of side effects and adverse effects of this medication  Reviewed shortages and goal for more medication consistency   Continue associated lifestyle changes  Reviewed dose increase as tolerated and response curve  Nutrition: low carb diet/ recommended to eat breakfast daily/ regular protein intake  Medication use and side effects reviewed with patient.  Medication contraindications: n/a  Follow up with dietitian and psychologist as recommended.  Discussed the role of sleep and stress in weight management.  Counseled on comprehensive weight loss plan including attention to nutrition, exercise and behavior/stress management for success. See patient instruction below for more details.  Discussed strategies to overcome barriers to successful weight loss and weight maintenance  FITTE: ACSM recommendations (150-300 minutes/ week in active weight loss)   Weight Loss consent to treat reviewed and signed     There are no Patient Instructions on file for this visit.    No follow-ups on file.    Patient verbalizes understanding.    Sheri Dillon MD

## 2024-10-23 NOTE — TELEPHONE ENCOUNTER
Tony Brannon MD  You23 minutes ago (4:24 PM)       I can do that at follow up for right hip injection     You routed conversation to Marcella Patient [id number] minutes ago (4:18 PM)     You  Tony Brannon MD30 minutes ago (4:17 PM)     NAOMY  Can you place order for left hip injection? Or do you want to see her first in the office to discuss left side hip pain?           Tony Brannon MD  You19 minutes ago (3:55 PM)       Would not     You  Tony Brannon MD47 minutes ago (3:27 PM)     KD  Patient is asking if you can do hip injection as bilateral instead? Please advise.

## 2024-10-24 ENCOUNTER — OFFICE VISIT (OUTPATIENT)
Dept: INTERNAL MEDICINE CLINIC | Facility: CLINIC | Age: 44
End: 2024-10-24
Payer: COMMERCIAL

## 2024-10-24 VITALS
RESPIRATION RATE: 16 BRPM | SYSTOLIC BLOOD PRESSURE: 120 MMHG | HEIGHT: 68 IN | BODY MASS INDEX: 26.83 KG/M2 | HEART RATE: 84 BPM | WEIGHT: 177 LBS | DIASTOLIC BLOOD PRESSURE: 78 MMHG

## 2024-10-24 DIAGNOSIS — R63.2 BINGE EATING: ICD-10-CM

## 2024-10-24 DIAGNOSIS — E66.9 OBESITY (BMI 30-39.9): ICD-10-CM

## 2024-10-24 DIAGNOSIS — Z51.81 THERAPEUTIC DRUG MONITORING: Primary | ICD-10-CM

## 2024-10-24 PROCEDURE — 99213 OFFICE O/P EST LOW 20 MIN: CPT | Performed by: INTERNAL MEDICINE

## 2024-10-24 RX ORDER — TIRZEPATIDE 12.5 MG/.5ML
12.5 INJECTION, SOLUTION SUBCUTANEOUS WEEKLY
Qty: 2 ML | Refills: 1 | Status: SHIPPED | OUTPATIENT
Start: 2024-10-24 | End: 2024-11-15

## 2024-10-31 NOTE — TELEPHONE ENCOUNTER
Requesting   Requested Prescriptions     Pending Prescriptions Disp Refills    CARVEDILOL 3.125 MG Oral Tab [Pharmacy Med Name: CARVEDILOL 3.125 MG TABLET] 180 tablet 2     Sig: TAKE 1 TABLET BY MOUTH TWICE A DAY WITH MEALS       LOV: 10/24/2024  RTC: 03/20/2025  Filled: 04/05/2024 180 tablets with 2 refills    Future Appointments   Date Time Provider Department Center   11/25/2024 10:10 AM PF XR [facility] 1 PF XRAY [place]   11/25/2024 10:40 AM Fidencio Bangura MD [facility] [address]   3/20/2025 11:00 AM Sheri Dillon MD [facility] [address]   6/16/2025 11:00 AM Sheri Dillon MD [facility] [address]

## 2024-11-04 RX ORDER — CARVEDILOL 3.12 MG/1
3.12 TABLET ORAL 2 TIMES DAILY WITH MEALS
Qty: 180 TABLET | Refills: 2 | Status: SHIPPED | OUTPATIENT
Start: 2024-11-04

## 2024-11-07 ENCOUNTER — APPOINTMENT (OUTPATIENT)
Dept: GENERAL RADIOLOGY | Facility: HOSPITAL | Age: 44
End: 2024-11-07
Attending: ANESTHESIOLOGY
Payer: COMMERCIAL

## 2024-11-07 ENCOUNTER — HOSPITAL ENCOUNTER (OUTPATIENT)
Facility: HOSPITAL | Age: 44
Setting detail: HOSPITAL OUTPATIENT SURGERY
Discharge: HOME | End: 2024-11-07
Attending: ANESTHESIOLOGY | Admitting: ANESTHESIOLOGY
Payer: COMMERCIAL

## 2024-11-07 VITALS
WEIGHT: 177 LBS | HEIGHT: 68 IN | RESPIRATION RATE: 18 BRPM | HEART RATE: 75 BPM | DIASTOLIC BLOOD PRESSURE: 72 MMHG | TEMPERATURE: 97 F | BODY MASS INDEX: 26.83 KG/M2 | OXYGEN SATURATION: 100 % | SYSTOLIC BLOOD PRESSURE: 121 MMHG

## 2024-11-07 LAB — B-HCG UR QL: NEGATIVE

## 2024-11-07 PROCEDURE — 20610 DRAIN/INJ JOINT/BURSA W/O US: CPT | Performed by: ANESTHESIOLOGY

## 2024-11-07 PROCEDURE — 3E0U3BZ INTRODUCTION OF ANESTHETIC AGENT INTO JOINTS, PERCUTANEOUS APPROACH: ICD-10-PCS | Performed by: ANESTHESIOLOGY

## 2024-11-07 PROCEDURE — 77002 NEEDLE LOCALIZATION BY XRAY: CPT | Performed by: ANESTHESIOLOGY

## 2024-11-07 PROCEDURE — 3E0U33Z INTRODUCTION OF ANTI-INFLAMMATORY INTO JOINTS, PERCUTANEOUS APPROACH: ICD-10-PCS | Performed by: ANESTHESIOLOGY

## 2024-11-07 RX ORDER — NALOXONE HYDROCHLORIDE 0.4 MG/ML
0.08 INJECTION, SOLUTION INTRAMUSCULAR; INTRAVENOUS; SUBCUTANEOUS AS NEEDED
Status: DISCONTINUED | OUTPATIENT
Start: 2024-11-07 | End: 2024-11-07

## 2024-11-07 RX ORDER — LIDOCAINE HYDROCHLORIDE 10 MG/ML
INJECTION, SOLUTION EPIDURAL; INFILTRATION; INTRACAUDAL; PERINEURAL
Status: DISCONTINUED | OUTPATIENT
Start: 2024-11-07 | End: 2024-11-07

## 2024-11-07 RX ORDER — METHYLPREDNISOLONE ACETATE 40 MG/ML
INJECTION, SUSPENSION INTRA-ARTICULAR; INTRALESIONAL; INTRAMUSCULAR; SOFT TISSUE
Status: DISCONTINUED | OUTPATIENT
Start: 2024-11-07 | End: 2024-11-07

## 2024-11-07 NOTE — DISCHARGE INSTRUCTIONS
Home Care Instructions Following Your Pain Procedure     Lisa,  It has been a pleasure to have you as our patient. To help you at home, you must follow these general discharge instructions. We will review these with you before you are discharged. It is our hope that you have a complete and uneventful recovery from our procedure.     General Instructions:  What to Expect:  Bandages from your procedure today can be removed when you get home.  Please avoid soaking and/or swimming for 24 hours.  Showering is okay  It is normal to have increased pain symptoms and/or pain at injection site for up to 3-5 days after procedure, you can use heat or ice (20 minutes on 20 minutes off) for comfort.  You may experience some temporary side effects which may include restlessness or insomnia, flushing of the face, or heart palpitations.  Please contact the provider if these symptoms do not resolve within 3-4 days.  Lightheadedness or nausea may occur and should resolve within 24 to 48 hours.  If you develop a headache after treatment, rest, drink fluids (with caffeine, if possible) and take mild over-the-counter pain medication.  If the headache does not improve with the above treatment, contact the physician.  Home Medications:  Resume all previously prescribed medication.  Please avoid taking NSAIDs (Non-Steriodal Anti-Inflammatory Drugs) such as:  Ibuprofen ( Advil, Motrin) Aleve (Naproxen), Diclofenac, Meloxicam for 6 hours after procedure.   If you are on Coumadin (Warfarin) or any other anti-coagulant (or \"blood thinning\") medication such as Plavix (Clopidogrel), Xarelto (Rivaroxaban), Eliquis (Apixaban), Effient (Prasugrel) etc., restart on the following day from the procedure unless otherwise directed by your provider.  If you are a diabetic, please increase the frequency of your glucose monitoring after the procedure as steroids may cause a temporary (2-3 day) increase in your blood sugar.  Contact your primary care  physician if your blood sugar remains elevated as you may require some medication adjustment.  Diet:  Resume your regular diet as tolerated.  Activity:  We recommend that you relax and rest during the rest of your procedure day.  If you feel weakness in your arms or legs do not drive.  Follow-up Appointment  Please schedule a follow-up visit within 3 to 4 weeks after your last procedure date.  Question or Concerns:  Feel free to call our office with any questions or concerns at [phone] (option #2)    Lisa  Thank you for coming to [facility] for your procedure.  The nurses try very hard to make sure you receive the best care possible.  Your trust in them as well as us is greatly appreciated.    Thanks so much,   Dr. Tony Brannon   Cardiac

## 2024-11-07 NOTE — H&P
History & Physical Examination    Patient Name: Lisa Martin  MRN: [id number]  CSN: [id number]  YOB: 1980    Pre-Operative Diagnosis:  Pain of right hip [M25.551]    Present Illness: Hip pain    ASA: 2  MP class: 1  Sedation: Local      Prescriptions Prior to Admission[1]  No current facility-administered medications for this encounter.       Allergies: Allergies[2]    Past Medical History:    Anxiety    Zoloft daily    Arrhythmia    chronic pvc    Depression    Migraines    PVCs (premature ventricular contractions)    Chronic; Pt currently taking Toprol XL daily    Rotator cuff tear    Left shoulder     Past Surgical History:   Procedure Laterality Date    Anesth, section  2017    Oral surgery procedure      [place] teeth extraction - no complications    Upper gi endoscopy,exam      EGD     Family History   Problem Relation Age of Onset    Bipolar Disorder Father     Depression Father     Diabetes Father     Alcohol and Other Disorders Associated Father     Hypertension Father     Other (Alcoholism) Father     Arthritis Maternal Grandfather     Diabetes Maternal Grandmother     Stroke Maternal Grandmother     Other (Elevated C-reaction Protein) Sister     Hypertension Mother     Thyroid Disorder Mother     Other (Other) Mother     Lung Disorder Paternal Grandfather     Other (Pulmonary Fibrosis) Paternal Grandfather     Arthritis Paternal Grandmother     Other (Endometriosis) Sister     Other (Rectal Prolapse) Sister      Social History     Tobacco Use    Smoking status: Former     Current packs/day: 0.00     Types: Cigarettes     Start date: 2003     Quit date: 2013     Years since quittin.5    Smokeless tobacco: Never    Tobacco comments:     13   Substance Use Topics    Alcohol use: Yes     Alcohol/week: 1.0 standard drink of alcohol     Comment: Socially       SYSTEM Check if Review is Normal Check if Physical Exam is Normal If not normal, please explain:    HEENT [x ] [x ]    NECK & BACK [x ] [x ]    HEART [x ] [x ]    LUNGS [x ] [x ]    ABDOMEN [x ] [x ]    UROGENITAL [x ] [x ]    EXTREMITIES [x ] [x ]    OTHER        [ x ] I have discussed the risks and benefits and alternatives with the patient/family.  They understand and agree to proceed with plan of care.  [ x ] I have reviewed the History and Physical done within the last 30 days.  Any changes noted above.    Tony Brannon MD              [1]   Medications Prior to Admission   Medication Sig Dispense Refill Last Dose/Taking    CARVEDILOL 3.125 MG Oral Tab TAKE 1 TABLET BY MOUTH TWICE A DAY WITH MEALS 180 tablet 2 2024 at  9:00 PM    Tirzepatide-Weight Management (ZEPBOUND) 12.5 MG/0.5ML Subcutaneous Solution Auto-injector Inject 12.5 mg into the skin once a week for 4 doses. 2 mL 1 Past Month    Atogepant (QULIPTA) 60 MG Oral Tab Take 1 tablet by mouth daily. 90 tablet 1 2024 at  9:00 PM    sertraline 100 MG Oral Tab Take 1 tablet (100 mg total) by mouth daily. 90 tablet 3 2024 at  9:00 PM    Eletriptan Hydrobromide 40 MG Oral Tab TAKE 1 TABLET BY MOUTH AT ONSET AND MAY REPEAT IF NEEDED AFTER 2 HOURS FOR A MAX OF 2 PILLS IN 24 HOURS 10 tablet 3 Past Week    ZEPBOUND 10 MG/0.5ML Subcutaneous Solution Auto-injector Inject 10 mg into the skin once a week. 2 mL 1 More than a month    cyclobenzaprine 10 MG Oral Tab Take 1 tablet (10 mg total) by mouth 3 (three) times daily as needed.   More than a month    [] Tirzepatide-Weight Management (ZEPBOUND) 7.5 MG/0.5ML Subcutaneous Solution Auto-injector Inject 7.5 mg into the skin once a week for 4 doses. 2 mL 0     ALPRAZolam 0.25 MG Oral Tab Take 1 tablet (0.25 mg total) by mouth nightly as needed for Anxiety. 10 tablet 0 Unknown    ondansetron 4 MG Oral Tablet Dispersible Take 1 tablet (4 mg total) by mouth every 8 (eight) hours as needed for Nausea.   More than a month    Docusate Sodium (COLACE OR) Take by mouth.       levonorgestrel 20  MCG/24HR Intrauterine IUD 20 mcg (1 each total) by Intrauterine route once.      [2] No Known Allergies

## 2024-11-07 NOTE — OPERATIVE REPORT
[facility]  Operative Report  2024     Lisa SAUNDERS Palalphonsekaitis Patient Status:  Hospital Outpatient Surgery    1980 MRN [id number]   Location [facility] Attending Tony Brannon MD   Hosp Day # 0 PCP Daily Urena MD     Indication: Lisa is a 44 year old female with hip pain    Preoperative Diagnosis:  Pain of right hip [M25.551]    Postoperative Diagnosis: Same as above.    Procedure performed: right HIP JOINT INJECTION with local    Anesthesia: Local      EBL: Less than 1 ml.    Procedure Description:  After reviewing the patient’s history and performing a focused physical examination, the diagnosis was confirmed and contraindications such as infection and coagulopathy were ruled out.  Following review of potential side effects and complications, including but not necessarily limited to infection, allergic reaction, local tissue breakdown, nerve injury, and paresis, the patient indicated they understood and agreed to proceed.       The patient was brought to the procedure room and placed in supine position. After prepping and draping, the hip joint was identified with the help of fluoroscopy.  A 22-gauge 3.5-inch spinal needle was used to enter the joint after local infiltration with lidocaine.  Then 0.5 cc dye was injected and a nice hip arthrogram was revealed.  After that, 40 mg Depo-Medrol mixed with 4 cc 1% Lidocaine was injected. The patient tolerated the procedure very well. The patient had complete understanding of the risks and benefits of the procedure.      Complications: None.    Follow up:  Clinic    Tony Brannon MD

## 2024-11-18 ENCOUNTER — HOSPITAL ENCOUNTER (OUTPATIENT)
Dept: GENERAL RADIOLOGY | Age: 44
Discharge: HOME | End: 2024-11-18
Attending: ORTHOPAEDIC SURGERY
Payer: COMMERCIAL

## 2024-11-18 ENCOUNTER — OFFICE VISIT (OUTPATIENT)
Facility: CLINIC | Age: 44
End: 2024-11-18
Payer: COMMERCIAL

## 2024-11-18 VITALS — HEIGHT: 68 IN | BODY MASS INDEX: 25.01 KG/M2 | WEIGHT: 165 LBS

## 2024-11-18 DIAGNOSIS — M25.551 BILATERAL HIP PAIN: ICD-10-CM

## 2024-11-18 DIAGNOSIS — M24.851 OTH SPECIFIC JOINT DERANGEMENTS OF RIGHT HIP, NEC: ICD-10-CM

## 2024-11-18 DIAGNOSIS — M25.851 HIP IMPINGEMENT SYNDROME, RIGHT: Primary | ICD-10-CM

## 2024-11-18 DIAGNOSIS — S73.191A TEAR OF RIGHT ACETABULAR LABRUM, INITIAL ENCOUNTER: ICD-10-CM

## 2024-11-18 DIAGNOSIS — M25.552 BILATERAL HIP PAIN: ICD-10-CM

## 2024-11-18 PROCEDURE — 99204 OFFICE O/P NEW MOD 45 MIN: CPT | Performed by: FAMILY MEDICINE

## 2024-11-18 PROCEDURE — 73523 X-RAY EXAM HIPS BI 5/> VIEWS: CPT | Performed by: ORTHOPAEDIC SURGERY

## 2024-11-18 RX ORDER — METHOCARBAMOL 750 MG/1
750 TABLET, FILM COATED ORAL NIGHTLY PRN
Qty: 30 TABLET | Refills: 0 | Status: SHIPPED | OUTPATIENT
Start: 2024-11-18

## 2024-11-19 NOTE — H&P
Sports Medicine Clinic Note    Subjective:    Chief Complaint: Bilateral hip pain, right side more bothersome starting June 2019 and left side starting August 2024.    History: 44-year-old female presents with chronic bilateral hip pain, worse on the right side. Symptoms began insidiously in 2019 on the right and more recently on the left. Pain is described as sharp, rated at a 4/10 in intensity, intermittent in nature, and exacerbated by transitioning from sitting to standing, laying on her side, or certain movements. Pain partially improved with a prior articular injection into the right hip, but relief was temporary. Conservative measures, including physical therapy and anti-inflammatory medications, have been ineffective. Pain management has included a local injection. Pain also impacts sleep, with difficulty falling asleep and nighttime awakenings. She is a nurse at [employer] and states she leads a mostly sedentary life currently but was more active in her youth.    Objective:    Bilateral Hip Examination:    Inspection: No erythema, swelling, or gross deformities noted. Symmetrical gait observed.  Palpation: Tenderness over the greater trochanter bilaterally, right more than left.  Range of Motion: Pain with flexion beyond 90 degrees, internal rotation, and adduction on the right. Left hip range of motion is minimally painful at end ranges.  Neurovascular: Sensation intact bilaterally to light touch in the femoral and sciatic nerve distributions. Distal pulses palpable.  Special Tests: Positive FADIR test bilaterally, more pronounced on the right. Negative CHEMO test bilaterally.    Diagnostic Tests:    MRI (Right Hip):  Focal convexity of the anterior femoral head/neck junction with an elevated alpha angle of 65 degrees, indicative of CAM-type femoroacetabular impingement (FRANC).  Nondisplaced acetabular labral tear involving anterosuperior and posterosuperior quadrants.  Small hip joint effusion.  Mild insertional  tendinopathy of the gluteus minimus.  X-ray (Bilateral Hips):  No fractures, arthropathy, or significant abnormalities identified.    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  Chronic bilateral hip pain with features of FRANC suspected to be contributing.  Gluteal tendinopathy, right hip, not primary pain generator in my opinion    Plan:    Additional Workup: None at this time. Consider left hip MRI in the future for surgical planning if needed.  Therapy: Initiate formal physical therapy with a focus on improving hip mobility, core strengthening, and addressing muscular imbalances.  Medications: Prescribe a muscle relaxant to assist with nighttime symptoms. Continue NSAIDs for pain management as needed.  Bracing/Casting: Not indicated.  Procedures: Defer repeat articular injection at this time due to limited prior response. Consider hip arthroscopy if conservative measures fail.  Activity Recommendations: Avoid high-impact activities and deep squatting. Encourage low-impact exercises such as swimming or cycling.    Follow-Up: Tentatively scheduled in 6 to 8 weeks to assess response to physical therapy and medication. Return sooner if symptoms worsen or new issues arise.      Fermin Aviles DO, CAQSM   Primary Care Sports Medicine

## 2025-01-03 ENCOUNTER — TELEPHONE (OUTPATIENT)
Dept: PHYSICAL THERAPY | Facility: HOSPITAL | Age: 45
End: 2025-01-03

## 2025-01-06 ENCOUNTER — OFFICE VISIT (OUTPATIENT)
Dept: PHYSICAL THERAPY | Age: 45
End: 2025-01-06
Attending: FAMILY MEDICINE
Payer: COMMERCIAL

## 2025-01-06 DIAGNOSIS — M24.851 OTH SPECIFIC JOINT DERANGEMENTS OF RIGHT HIP, NEC: Primary | ICD-10-CM

## 2025-01-06 PROCEDURE — 97162 PT EVAL MOD COMPLEX 30 MIN: CPT

## 2025-01-06 PROCEDURE — 97110 THERAPEUTIC EXERCISES: CPT

## 2025-01-06 NOTE — PROGRESS NOTES
LOWER EXTREMITY EVALUATION:     Diagnosis:   Oth specific joint derangements of right hip, NEC (M24.851)      Referring Provider: Fermin Aviles  Date of Evaluation:    1/6/2025    Precautions:   cardiac hx, depression hx Next MD visit:   none scheduled  Date of Surgery: n/a     PATIENT SUMMARY   Lisa Martin is a 44 year old female who presents to therapy today with complaints of B hip pain. Imaging was positive for labral tear on L.  Pt also reports hx of L LBP, also gets worse with night.  Pt describes pain level current 0/10, at best 0/10, at worst 5/10 - nighttime, positioning, laying on either side (pt is not back or stomach sleeper).  Pain is where hip meets butt, posterior to greater trochanter.  Current functional limitations include side lying, STS transfers.     Lisa describes prior level of function IND. Pt goals include decrease pain, avoid surgery, sleep through the night.  Past medical history was reviewed with Lisa. Significant findings include migraines (daily preventative), depression/anxiety, PVCs and arrhythmia.  Medications reviewed.    ASSESSMENT  Lisa presents to physical therapy evaluation with primary c/o B hip pain. The results of the objective tests and measures show limitation in strength, pain at end range of ROM and poor posture with functional movements.  Functional deficits include but are not limited to STS transfers and SL at night resulting in poor sleep and vivid dreams related to injury.  Signs and symptoms are consistent with diagnosis of B hip pain with labral tear on imaging. Pt and PT discussed evaluation findings, pathology, POC and HEP.  Pt voiced understanding and performs HEP correctly without reported pain. Skilled Physical Therapy is medically necessary to address the above impairments and reach functional goals.     OBJECTIVE:   Observation/Posture: forward head, forward shoulder, posterior pelvic tilt    Gait: pt ambulates on level ground with normal  mechanics.    Trunk Active Range of Motion   % Comment   Forward Flexion 80    Extension 50 Pain and pinching in bow   Right Side Bending 100    Left Side Bending 100    Right Rotation 100    Left Rotation 100    Right Side [place] WFL    Left Side [place] WFL      Repeated Movement Testing: No worse with PPU    Palpation: Pain with resistance at L3-5 and sacrum, sacrum in extension (Tailbone lifted)    Lower Extremity Manual Muscle Test     R  L  Comments   Hip Flexion (L1-L2) 4-/5 4-/5    Hip Extension (S1) 4/5 4/5    Hip Abduction 2+/5 2+/5 Due to pain, phasic shaking, quad substitution with ER and flexion   Hip Adduction 4/5 4/5    Hip Internal Rotation 2+/5 2+/5 Due to pain   Hip External Rotation 3+/5 3+/5    Knee Flexion (S2) 4+/5 4+/5    Knee Extension (L3) 4+/5 4+/5      Squat: narrow DARRYN, lifts onto toes  Balance: WFL  Special tests:   1 finger width BRUCE with 1 knuckle depth     Today’s Treatment and Response:   Pt education was provided on exam findings, treatment diagnosis, treatment plan, expectations, and prognosis. Pt was also provided recommendations for activity modifications, postural corrections, ergonomics, and importance of remaining active.  Patient was instructed in and issued a HEP:  Access Code: [email or web address]  URL: [email or web address]  Date: 01/06/2025  Prepared by: Brittaney Van    Exercises  - Clamshell  - 1 x daily - 4 x weekly - 2 sets - 20 reps  - Sidelying Reverse Clamshell  - 1 x daily - 4 x weekly - 2 sets - 20 reps  - Sidelying Hip Abduction  - 1 x daily - 4 x weekly - 3 sets - 10 reps  - Squat     Charges: PT Eval Moderate Complexity, 30      Total Timed Treatment: 15 min     Total Treatment Time: 45 min     Based on clinical rationale and outcome measures, this evaluation involved Moderate Complexity decision making due to 1-2 personal factors/comorbidities, 3 body structures involved/activity limitations, and evolving symptoms including  progressively worsening sx  .  PLAN OF CARE:    Goals: (to be met in 12 visits)  Pt to be IND with HEP for maintenance of therapeutic gains  Pt will increase LE strength by half to one whole MMT grade  Pt will demonstrate lifting mechanics with appropriate form to decrease risk of injury and progression of symptoms  Pt will report sleeping through the night with hip pain no greater than 2-3/10  Pt will report STS transfers without pain.      Frequency / Duration: Patient will be seen for 2 x/week or a total of 12 visits over a 90 day period. Treatment will include: Manual Therapy, Neuromuscular Re-education, Therapeutic Exercise, and Home Exercise Program instruction    Education or treatment limitation: None  Rehab Potential:good    LEFS Score  LEFS Score: (Patient-Rptd) 62.5 % (1/6/2025 10:19 AM)    Patient/Family/Caregiver was advised of these findings, precautions, and treatment options and has agreed to actively participate in planning and for this course of care.    Thank you for your referral. Please co-sign or sign and return this letter via fax as soon as possible to [phone]. If you have any questions, please contact me at Dept: [phone]    Sincerely,  Electronically signed by therapist: Brittaney Van, PT, DPT, PCES  Physician's certification required: Yes  I certify the need for these services furnished under this plan of treatment and while under my care.    X___________________________________________________ Date____________________    Certification From: 1/6/2025  To:4/6/2025

## 2025-01-14 ENCOUNTER — OFFICE VISIT (OUTPATIENT)
Dept: PHYSICAL THERAPY | Age: 45
End: 2025-01-14
Attending: FAMILY MEDICINE
Payer: COMMERCIAL

## 2025-01-14 PROCEDURE — 97140 MANUAL THERAPY 1/> REGIONS: CPT

## 2025-01-14 PROCEDURE — 97110 THERAPEUTIC EXERCISES: CPT

## 2025-01-14 NOTE — PROGRESS NOTES
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  Pain was improving with STS transfers in hip but nighttime sleeping was worse.  Stabbing hip pain worse at night. Moving and changing positions did improve sx.  R possibly worse than L but both were majorly symptomatic.       Pain: 7/10 (At worst since last session)     Objective      Observation/Posture: forward head, forward shoulder, posterior pelvic tilt     Gait: pt ambulates on level ground with normal mechanics.     Trunk Active Range of Motion    % Comment   Forward Flexion 80     Extension 50 Pain and pinching in bow   Right Side Bending 100     Left Side Bending 100     Right Rotation 100     Left Rotation 100     Right Side [place] WFL     Left Side [place] WFL        Repeated Movement Testing: No worse with PPU     Palpation: Pain with resistance at L3-5 and sacrum, sacrum in extension (Tailbone lifted)  No pain with palpation of B psoas muscles     Lower Extremity Manual Muscle Test      R  L  Comments   Hip Flexion (L1-L2) 4-/5 4-/5     Hip Extension (S1) 4/5 4/5     Hip Abduction 2+/5 2+/5 Due to pain, phasic shaking, quad substitution with ER and flexion   Hip Adduction 4/5 4/5     Hip Internal Rotation 2+/5 2+/5 Due to pain   Hip External Rotation 3+/5 3+/5     Knee Flexion (S2) 4+/5 4+/5     Knee Extension (L3) 4+/5 4+/5        Squat: narrow DARRYN, lifts onto toes  Balance: WFL  Special tests:   1 finger width BRUCE with 1 knuckle depth    Assessment  Cont to report pain posterior to greater trochanter B in SL at night.  Also reports pain with STS but that's overall improving with HEP.  Plan tocont manual therapy PRN and progress hip rotation and abduction strength focusing on avoiding genu recurvatum. Squat and lifting mechanics also need reviewing.    Goals (to be met in 12 visits)   Goals       Therapy Goals     Goals: (to be met in 12 visits)  Pt to be IND with HEP for maintenance of therapeutic gains  Pt will increase LE strength by half to one whole MMT grade  Pt will demonstrate lifting mechanics with appropriate form to decrease risk of injury and progression of symptoms  Pt will report sleeping through the night with hip pain no greater than 2-3/10  Pt will report STS transfers without pain              Plan  Patient will be seen for 2 x/week or a total of 12 visits over a 90 day period. Treatment will include: Manual Therapy, Neuromuscular Re-education, Therapeutic Exercise, and Home Exercise Program instruction    Treatment Last 4 Visits       1/9/2025 1/14/2025   Treatment   Treatment Day 2 3   Therapeutic Exercise Bridge with band at knees, YTB, 20 --> phasic shaking present  Full ROM hip IR/ER clamshell 2x10 B  Alternating with SL hip abduction 2x10 B  Prone hip ext, glute focused, alternating 2x10 B  Prone hip ext with knee bend x20 B  4pt hip IR/ER ROM x20 B  Seated hip hinge with YTB, x10  Standing hip hinge with YTB, x10  Squat with YTB, 2x10 --> max cues for form Alt clamshells x10 B  SL hip abd x10 B  Seated hip hinge x10  STS with hip hinge x10  Squat with hip ER focus x10   Manual Therapy  STM/MFR to B lateral thigh and glute  Sacral counter nutation mobilization grade 4 --> reports relief  Pain with resistance at L3-5 and S1, sacrum in extension --> improved with mobilization grade 2-3, 3x60s ea   Therapeutic Exercise Min  15   Manual Therapy Min  30   Total of Timed Procedures 0 45   Total of Service Based 0 0   Total Treatment Time 0 45         HEP  Access Code: [email or web address]  URL:  [email or web address]  Date: 01/09/2025  Prepared by: Brittaney Van     Exercises  - Clamshell  - 1 x daily - 4 x weekly - 2 sets - 20 reps  - Sidelying Reverse Clamshell  - 1 x daily - 4 x weekly - 2 sets - 20 reps  - Sidelying Hip Abduction  - 1 x daily - 4 x weekly - 3 sets - 10 reps  - Prone Hip Extension  - 1 x daily - 4 x weekly - 3 sets - 10 reps  - Prone Hip Extension with Bent Knee  - 1 x daily - 4 x weekly - 3 sets - 10 reps  - Seated Hip Hinge with Dowel  - 1 x daily - 4 x weekly - 3 sets - 10 reps  - Standing Hip Hinge  - 1 x daily - 4 x weekly - 3 sets - 10 reps  - Squat  - 1 x daily - 4 x weekly - 3 sets - 10 reps      Charges  2 man, 1 TE

## 2025-01-17 ENCOUNTER — OFFICE VISIT (OUTPATIENT)
Dept: PHYSICAL THERAPY | Age: 45
End: 2025-01-17
Attending: FAMILY MEDICINE
Payer: COMMERCIAL

## 2025-01-17 PROCEDURE — 97140 MANUAL THERAPY 1/> REGIONS: CPT

## 2025-01-17 PROCEDURE — 97110 THERAPEUTIC EXERCISES: CPT

## 2025-01-17 NOTE — PROGRESS NOTES
Patient: Lisa SAUNDERS Paliokaitis (44 year old, female) Referring Provider:  Insurance:   Diagnosis:   Fermin VIVAR   Date of Surgery: No data recorded Next MD visit:  N/A   Precautions:    No data recorded Referral Information:    Date of Evaluation: Req: 0, Auth: 0, Exp:     No data recorded POC Auth Visits:  12       Today's Date   1/17/2025    Subjective  Patient reports night pain has still been the worst, has dreams about injuring her hip and wakes up from this       Pain: 0/10     Objective  worst pain since last visit: 6/10 (night pain)      Assessment  Patient care this day focused on improving tissue mobility of lateral hips bilaterally by use of cupping and instructing patient through variations of stretching for gluteals. Patient was then instructed through continued S/L hip abduction, and began quadruped hip extension and ER/fire hydrants. Patient was with improved tissue mobility by end of session, but demonstrating some instability with hip swing when walking. Patient would benefit from updated HEP next visit and follow up on response to today's treatment.    Goals (to be met in 12 visits)   Goals       Therapy Goals     Goals: (to be met in 12 visits)  Pt to be IND with HEP for maintenance of therapeutic gains  Pt will increase LE strength by half to one whole MMT grade  Pt will demonstrate lifting mechanics with appropriate form to decrease risk of injury and progression of symptoms  Pt will report sleeping through the night with hip pain no greater than 2-3/10  Pt will report STS transfers without pain              Plan  Update HEP with quadruped hip ext variations and hip ER next visit    Treatment Last 4 Visits       1/9/2025 1/14/2025 1/17/2025   Treatment   Treatment Day 2 3    Therapeutic Exercise Bridge with band at knees, YTB, 20 --> phasic shaking present  Full ROM hip IR/ER clamshell 2x10 B  Alternating with SL hip abduction 2x10 B  Prone hip ext, glute focused, alternating 2x10  B  Prone hip ext with knee bend x20 B  4pt hip IR/ER ROM x20 B  Seated hip hinge with YTB, x10  Standing hip hinge with YTB, x10  Squat with YTB, 2x10 --> max cues for form Alt clamshells x10 B  SL hip abd x10 B  Seated hip hinge x10  STS with hip hinge x10  Squat with hip ER focus x10 Gluteal Mob with Ball x 20 ea R/L  S/L Hip Abduction x 10 ea R/L  Figure 4 Piriformis Stretch 2 x 30\" ea R/L   Quadruped Hip Ext (Knee Flex) x 10 ea R/L   Quadruped Hip ER/Fire Hydrant x 10 ea R/L   Supine Gluteal Stretch 2 x 30\" ea R/L    Manual Therapy  STM/MFR to B lateral thigh and glute  Sacral counter nutation mobilization grade 4 --> reports relief  Pain with resistance at L3-5 and S1, sacrum in extension --> improved with mobilization grade 2-3, 3x60s ea Cupping (L S/L): (R) Lateral Hip, Glute Med/Max, Proximal ITBand, Anteriolateral hip  Cupping (R S/L): )L) Lateral Hip    Therapeutic Exercise Min  15 22   Manual Therapy Min  30 18   Total of Timed Procedures 0 45 40   Total of Service Based 0 0 0   Total Treatment Time 0 45 40         HEP    Access Code: [email or web address]  URL: [email or web address]  Date: 01/09/2025  Prepared by: Brittaney Van     Exercises  - Clamshell  - 1 x daily - 4 x weekly - 2 sets - 20 reps  - Sidelying Reverse Clamshell  - 1 x daily - 4 x weekly - 2 sets - 20 reps  - Sidelying Hip Abduction  - 1 x daily - 4 x weekly - 3 sets - 10 reps  - Prone Hip Extension  - 1 x daily - 4 x weekly - 3 sets - 10 reps  - Prone Hip Extension with Bent Knee  - 1 x daily - 4 x weekly - 3 sets - 10 reps  - Seated Hip Hinge with Dowel  - 1 x daily - 4 x weekly - 3 sets - 10 reps  - Standing Hip Hinge  - 1 x daily - 4 x weekly - 3 sets - 10 reps  - Squat  - 1 x daily - 4 x weekly - 3 sets - 10 reps    Charges: Ther Ex x 2, Manual x 1

## 2025-01-21 ENCOUNTER — APPOINTMENT (OUTPATIENT)
Dept: PHYSICAL THERAPY | Age: 45
End: 2025-01-21
Attending: FAMILY MEDICINE
Payer: COMMERCIAL

## 2025-01-24 ENCOUNTER — APPOINTMENT (OUTPATIENT)
Dept: PHYSICAL THERAPY | Age: 45
End: 2025-01-24
Attending: FAMILY MEDICINE
Payer: COMMERCIAL

## 2025-01-27 ENCOUNTER — APPOINTMENT (OUTPATIENT)
Dept: PHYSICAL THERAPY | Age: 45
End: 2025-01-27
Attending: FAMILY MEDICINE
Payer: COMMERCIAL

## 2025-01-30 ENCOUNTER — OFFICE VISIT (OUTPATIENT)
Dept: PHYSICAL THERAPY | Age: 45
End: 2025-01-30
Attending: FAMILY MEDICINE
Payer: COMMERCIAL

## 2025-01-30 PROCEDURE — 97110 THERAPEUTIC EXERCISES: CPT

## 2025-01-30 PROCEDURE — 97140 MANUAL THERAPY 1/> REGIONS: CPT

## 2025-01-30 NOTE — PROGRESS NOTES
Patient: Lisa SAUNDERS Paliokaitis (44 year old, female) Referring Provider:  Insurance:   Diagnosis:   Fermin VIVAR   Date of Surgery: No data recorded Next MD visit:  N/A   Precautions:    No data recorded Referral Information:    Date of Evaluation: Req: 0, Auth: 0, Exp:     No data recorded POC Auth Visits:  12     Visit 5     Today's Date   1/30/2025    Subjective  Patient reports since last visit she did notice her hips felt a little looser for about 2 days then tension/pain came back. Patient reports sleep those two nights was a little better and more consistent as well, but hips feel about the same again now as they did prior to last visit.       Pain: 0/10     Objective  Worst since last visit: 6/10 (at night)       Assessment  Patient returned to PT this day reporting symptoms in her hips improved for about 2 days after last visit, and had better quality sleep during that time but has since been tight and painful again. Continued cupping in S/L on both hips to address tissue mobility limitations, then emphasized more strengthening for hips for better long term relief and carry over. Patient provided with updated HEP with more stretching and self mobilizations as strengthening alone has not changed symptoms between sessions much.    Goals (to be met in 12 visits)   Goals       Therapy Goals     Goals: (to be met in 12 visits)  Pt to be IND with HEP for maintenance of therapeutic gains  Pt will increase LE strength by half to one whole MMT grade  Pt will demonstrate lifting mechanics with appropriate form to decrease risk of injury and progression of symptoms  Pt will report sleeping through the night with hip pain no greater than 2-3/10  Pt will report STS transfers without pain            Plan  Plan to assess response to new HEP    Treatment Last 4 Visits       1/9/2025 1/14/2025 1/17/2025 1/30/2025   Treatment   Treatment Day 2 3     Therapeutic Exercise Bridge with band at knees, YTB, 20 --> phasic  shaking present  Full ROM hip IR/ER clamshell 2x10 B  Alternating with SL hip abduction 2x10 B  Prone hip ext, glute focused, alternating 2x10 B  Prone hip ext with knee bend x20 B  4pt hip IR/ER ROM x20 B  Seated hip hinge with YTB, x10  Standing hip hinge with YTB, x10  Squat with YTB, 2x10 --> max cues for form Alt clamshells x10 B  SL hip abd x10 B  Seated hip hinge x10  STS with hip hinge x10  Squat with hip ER focus x10 Gluteal Mob with Ball x 20 ea R/L  S/L Hip Abduction x 10 ea R/L  Figure 4 Piriformis Stretch 2 x 30\" ea R/L   Quadruped Hip Ext (Knee Flex) x 10 ea R/L   Quadruped Hip ER/Fire Hydrant x 10 ea R/L   Supine Gluteal Stretch 2 x 30\" ea R/L  Prone Hip Ext x 10 ea R/L   S/L Hip Abd 2 x 10 ea R/L   FR Glute Med/Max x 2 min ea R/L   Seated Figure 4 Piriformis Stretch 2 x 30\" ea R/L  Supine Figure 4 Piriformis 30\" ea R/L - feel more   H/L Gluteal Stretch 30\" ea R/L    Manual Therapy  STM/MFR to B lateral thigh and glute  Sacral counter nutation mobilization grade 4 --> reports relief  Pain with resistance at L3-5 and S1, sacrum in extension --> improved with mobilization grade 2-3, 3x60s ea Cupping (L S/L): (R) Lateral Hip, Glute Med/Max, Proximal ITBand, Anteriolateral hip  Cupping (R S/L): )L) Lateral Hip  Cupping (R S/L): Gluteals  Cupping (L S/L): Gluteals      Therapeutic Exercise Min  15 22 25   Manual Therapy Min  30 18 15   Total of Timed Procedures 0 45 40 40   Total of Service Based 0 0 0 0   Total Treatment Time 0 45 40 40         HEP  Access Code: [email or web address]  URL: [email or web address]  Date: 01/30/2025  Prepared by: Regina Green    Exercises  - Clamshell  - 1 x daily - 4 x weekly - 2 sets - 20 reps  - Sidelying Reverse Clamshell  - 1 x daily - 4 x weekly - 2 sets - 20 reps  - Sidelying Hip Abduction  - 1 x daily - 4 x weekly - 3 sets - 10 reps  - Prone Hip Extension  - 1 x daily - 4 x weekly - 3 sets - 10 reps  - Gluteus Mobilization with Foam Roll  - 2 x daily - 7 x  weekly - 1 sets - 2 min hold  - Supine Gluteus Stretch  - 2 x daily - 7 x weekly - 3 sets - 10 reps  - Bent Knee Fall Out with Ball   - 2-3 x daily - 7 x weekly - 1 sets - 20 reps    Charges     Ther Ex x 2, Manual x 1

## 2025-02-03 ENCOUNTER — OFFICE VISIT (OUTPATIENT)
Dept: PHYSICAL THERAPY | Age: 45
End: 2025-02-03
Attending: FAMILY MEDICINE
Payer: COMMERCIAL

## 2025-02-03 PROCEDURE — 97140 MANUAL THERAPY 1/> REGIONS: CPT

## 2025-02-03 PROCEDURE — 97110 THERAPEUTIC EXERCISES: CPT

## 2025-02-04 ENCOUNTER — TELEPHONE (OUTPATIENT)
Dept: NEUROLOGY | Facility: CLINIC | Age: 45
End: 2025-02-04

## 2025-02-04 NOTE — PROGRESS NOTES
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 Reports that sx improved temporarily with cupping and sacral mobilization but haven't changed since. Did purchase a foam roller. Sleeping cont to be painful and the most painful experience. Then amb about the first 50 steps after STS is second most painful       Pain: 0/10     Objective    Observation/Posture: forward head, forward shoulder, posterior pelvic tilt     Gait: pt ambulates on level ground with normal mechanics.     Trunk Active Range of Motion    % Comment   Forward Flexion 80     Extension 50 Pain and pinching in bow   Right Side Bending 100     Left Side Bending 100     Right Rotation 100     Left Rotation 100     Right Side [place] WFL     Left Side [place] WFL        Repeated Movement Testing: No worse with PPU     Palpation: Pain with resistance at L3-5 and sacrum, sacrum in extension (Tailbone lifted)     Lower Extremity Manual Muscle Test      R  L  Comments   Hip Flexion (L1-L2) 4-/5 4-/5     Hip Extension (S1) 4/5 4/5     Hip Abduction 2+/5 2+/5 Due to pain, phasic shaking, quad substitution with ER and flexion   Hip Adduction 4/5 4/5     Hip Internal Rotation 2+/5 2+/5 Due to pain   Hip External Rotation 3+/5 3+/5     Knee Flexion (S2) 4+/5 4+/5     Knee Extension (L3) 4+/5 4+/5        Squat: narrow DARRYN, lifts onto toes  Balance: WFL  Special tests:   1 finger width BRUCE with 1 knuckle depth         Assessment  Patient returned to PT this day reporting symptoms in her hips improved for about 2 days after last visit, and had better quality sleep during that time but has  since been tight and painful again. Continued cupping in S/L on both hips to address tissue mobility limitations, then emphasized more strengthening for hips for better long term relief and carry over. Patient provided with updated HEP with more stretching and self mobilizations as strengthening alone has not changed symptoms between sessions much.    Goals (to be met in 12 visits)   Goals       Therapy Goals     Goals: (to be met in 12 visits)  Pt to be IND with HEP for maintenance of therapeutic gains  Pt will increase LE strength by half to one whole MMT grade  Pt will demonstrate lifting mechanics with appropriate form to decrease risk of injury and progression of symptoms  Pt will report sleeping through the night with hip pain no greater than 2-3/10  Pt will report STS transfers without pain            Plan  Plan to assess response to new HEP    Treatment Last 4 Visits       1/9/2025 1/14/2025 1/17/2025 1/30/2025   Treatment   Treatment Day 2 3     Therapeutic Exercise Bridge with band at knees, YTB, 20 --> phasic shaking present  Full ROM hip IR/ER clamshell 2x10 B  Alternating with SL hip abduction 2x10 B  Prone hip ext, glute focused, alternating 2x10 B  Prone hip ext with knee bend x20 B  4pt hip IR/ER ROM x20 B  Seated hip hinge with YTB, x10  Standing hip hinge with YTB, x10  Squat with YTB, 2x10 --> max cues for form Alt clamshells x10 B  SL hip abd x10 B  Seated hip hinge x10  STS with hip hinge x10  Squat with hip ER focus x10 Gluteal Mob with Ball x 20 ea R/L  S/L Hip Abduction x 10 ea R/L  Figure 4 Piriformis Stretch 2 x 30\" ea R/L   Quadruped Hip Ext (Knee Flex) x 10 ea R/L   Quadruped Hip ER/Fire Hydrant x 10 ea R/L   Supine Gluteal Stretch 2 x 30\" ea R/L  Prone Hip Ext x 10 ea R/L   S/L Hip Abd 2 x 10 ea R/L   FR Glute Med/Max x 2 min ea R/L   Seated Figure 4 Piriformis Stretch 2 x 30\" ea R/L  Supine Figure 4 Piriformis 30\" ea R/L - feel more   H/L Gluteal Stretch 30\" ea R/L    Manual Therapy   STM/MFR to B lateral thigh and glute  Sacral counter nutation mobilization grade 4 --> reports relief  Pain with resistance at L3-5 and S1, sacrum in extension --> improved with mobilization grade 2-3, 3x60s ea Cupping (L S/L): (R) Lateral Hip, Glute Med/Max, Proximal ITBand, Anteriolateral hip  Cupping (R S/L): )L) Lateral Hip  Cupping (R S/L): Gluteals  Cupping (L S/L): Gluteals      Therapeutic Exercise Min  15 22 25   Manual Therapy Min  30 18 15   Total of Timed Procedures 0 45 40 40   Total of Service Based 0 0 0 0   Total Treatment Time 0 45 40 40         HEP  Access Code: [email or web address]  URL: [email or web address]  Date: 01/30/2025  Prepared by: Regina Green    Exercises  - Clamshell  - 1 x daily - 4 x weekly - 2 sets - 20 reps  - Sidelying Reverse Clamshell  - 1 x daily - 4 x weekly - 2 sets - 20 reps  - Sidelying Hip Abduction  - 1 x daily - 4 x weekly - 3 sets - 10 reps  - Prone Hip Extension  - 1 x daily - 4 x weekly - 3 sets - 10 reps  - Gluteus Mobilization with Foam Roll  - 2 x daily - 7 x weekly - 1 sets - 2 min hold  - Supine Gluteus Stretch  - 2 x daily - 7 x weekly - 3 sets - 10 reps  - Bent Knee Fall Out with Ball   - 2-3 x daily - 7 x weekly - 1 sets - 20 reps    Charges     Ther Ex x 2, Manual x 1           Assessment  No improvement with cupping last session and sx returning back to level of evaluation.  Cont to demonstrate lack of stabilization in lumbar region, hips and knees likely relation to hypermobility and/or ligament laxity. Strengthening cont to be primary focus of treatment.    Goals (to be met in 12 visits)   Goals       Therapy Goals     Goals: (to be met in 12 visits)  Pt to be IND with HEP for maintenance of therapeutic gains  Pt will increase LE strength by half to one whole MMT grade  Pt will demonstrate lifting mechanics with appropriate form to decrease risk of injury and progression of symptoms  Pt will report sleeping through the night with hip pain no  greater than 2-3/10  Pt will report STS transfers without pain              Plan  Plan to assess response to new HEP    Treatment Last 4 Visits       1/14/2025 1/17/2025 1/30/2025 2/3/2025   Treatment   Treatment Day 3   6   Therapeutic Exercise Alt clamshells x10 B  SL hip abd x10 B  Seated hip hinge x10  STS with hip hinge x10  Squat with hip ER focus x10 Gluteal Mob with Ball x 20 ea R/L  S/L Hip Abduction x 10 ea R/L  Figure 4 Piriformis Stretch 2 x 30\" ea R/L   Quadruped Hip Ext (Knee Flex) x 10 ea R/L   Quadruped Hip ER/Fire Hydrant x 10 ea R/L   Supine Gluteal Stretch 2 x 30\" ea R/L  Prone Hip Ext x 10 ea R/L   S/L Hip Abd 2 x 10 ea R/L   FR Glute Med/Max x 2 min ea R/L   Seated Figure 4 Piriformis Stretch 2 x 30\" ea R/L  Supine Figure 4 Piriformis 30\" ea R/L - feel more   H/L Gluteal Stretch 30\" ea R/L  Bird dog x10 B  Side planks 3x30s B --> reports some shoulder discomfort  Neutral curl up x10 B   Manual Therapy STM/MFR to B lateral thigh and glute  Sacral counter nutation mobilization grade 4 --> reports relief  Pain with resistance at L3-5 and S1, sacrum in extension --> improved with mobilization grade 2-3, 3x60s ea Cupping (L S/L): (R) Lateral Hip, Glute Med/Max, Proximal ITBand, Anteriolateral hip  Cupping (R S/L): )L) Lateral Hip  Cupping (R S/L): Gluteals  Cupping (L S/L): Gluteals    Sacral mobilization into PPT 2x60s  STM/MFR to sacral region including deep trigger pt release to B hip rotators --> twitch response on L glute    Therapeutic Exercise Min 15 22 25 10   Manual Therapy Min 30 18 15 30   Total of Timed Procedures 45 40 40 40   Total of Service Based 0 0 0 0   Total Treatment Time 45 40 40 40         HEP  Access Code: [email or web address]  URL: [email or web address]  Date: 02/03/2025  Prepared by: Brittaney Lausch    Exercises  - Clamshell  - 1 x daily - 4 x weekly - 2 sets - 20 reps  - Sidelying Reverse Clamshell  - 1 x daily - 4 x weekly - 2 sets - 20 reps  - Sidelying Hip Abduction   - 1 x daily - 4 x weekly - 3 sets - 10 reps  - Prone Hip Extension  - 1 x daily - 4 x weekly - 3 sets - 10 reps  - Gluteus Mobilization with Foam Roll  - 2 x daily - 7 x weekly - 1 sets - 2 min hold  - Supine Gluteus Stretch  - 2 x daily - 7 x weekly - 3 sets - 10 reps  - Bent Knee Fall Out with Ball   - 2-3 x daily - 7 x weekly - 1 sets - 20 reps  - Neutral Curl Up with Straight Leg  - 1 x daily - 7 x weekly - 3 sets - 10 reps  - Bird Dog  - 1 x daily - 7 x weekly - 3 sets - 10 reps    Charges     2 man, 1 TE

## 2025-02-04 NOTE — TELEPHONE ENCOUNTER
Patient called requesting status of forms. Patient stated [employer] sent on 1/13/25. Forms not found. Per supervisor ok to use previous standard Family Medical Leave Act form from [employer]. Logged forms for processing. Patient completed Release of Information on 2/4/25 for [employer](see To[employer] message)completed forms as stat. Same details as last form. Start date 2/1/25 to 8/1/25(6 month recert)

## 2025-02-04 NOTE — TELEPHONE ENCOUNTER
Dr. Roland,    Please sign off on form if you agree to: Family Medical Leave Act recert due to migraines from 2/1/25 to 8/1/25, 1 flare up/mo lasting 1 day and 1-2 appts/year lasting 1-4 hours    -Signature page will be the first page scanned  -From your Inbasket, Highlight the patient and click Chart   -Double click the 2/4/25 Forms Completion telephone encounter  -Scroll down to the Media section   -Click the blue Hyperlink: FMLA2(recert), Dr. Roland, 2/4/25  -Click Acknowledge located in the top right ribbon/menu   -Drag the mouse into the blank space of the document and a + sign will appear. Left click to   electronically sign the document.  -Once signed, simply exit out of the screen and you signature will be saved.     Thank you,  Lana GAITAN

## 2025-02-04 NOTE — TELEPHONE ENCOUNTER
Forms completed and faxed to [employer] [phone]. [email or web address] message sent, fax confirmed.

## 2025-02-06 ENCOUNTER — APPOINTMENT (OUTPATIENT)
Dept: PHYSICAL THERAPY | Age: 45
End: 2025-02-06
Attending: FAMILY MEDICINE
Payer: COMMERCIAL

## 2025-02-10 ENCOUNTER — OFFICE VISIT (OUTPATIENT)
Dept: PHYSICAL THERAPY | Age: 45
End: 2025-02-10
Attending: FAMILY MEDICINE
Payer: COMMERCIAL

## 2025-02-10 PROCEDURE — 97140 MANUAL THERAPY 1/> REGIONS: CPT

## 2025-02-10 PROCEDURE — 97110 THERAPEUTIC EXERCISES: CPT

## 2025-02-10 NOTE — PROGRESS NOTES
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 Cont to have pain for the first 50 steps after STS as well as sleeping. Also had an increase in pain after spending the weekend OOT with a friend (hotel bed).       Pain: 0/10     Objective      Observation/Posture: forward head, forward shoulder, posterior pelvic tilt     Gait: pt ambulates on level ground with normal mechanics.     Trunk Active Range of Motion    % Comment   Forward Flexion 80     Extension 50 Pain and pinching in bow      Repeated Movement Testing: No worse with PPU     Palpation: Pain with resistance at L3-5 and sacrum, sacrum in extension (Tailbone lifted)     Lower Extremity Manual Muscle Test      R  L  Comments   Hip Flexion (L1-L2) 4-/5 4-/5     Hip Extension (S1) 4/5 4/5     Hip Abduction 2+/5 2+/5 Due to pain, phasic shaking, quad substitution with ER and flexion   Hip Adduction 4/5 4/5     Hip Internal Rotation 2+/5 2+/5 Due to pain   Hip External Rotation 3+/5 3+/5     Knee Flexion (S2) 4+/5 4+/5     Knee Extension (L3) 4+/5 4+/5        Squat: narrow DARRYN, lifts onto toes  Balance: WFL  Special tests:   1 finger width BRUCE with 1 knuckle depth          Assessment  Cont to report no improvement. Attempting HEP adherence but some difficulty with newer exercises    Goals (to be met in 12 visits)   Goals       Therapy Goals     Goals: (to be met in 12 visits)  Pt to be IND with HEP for maintenance of therapeutic gains  Pt will increase LE strength by half to one whole MMT grade  Pt will demonstrate lifting mechanics with appropriate form to decrease risk of  injury and progression of symptoms  Pt will report sleeping through the night with hip pain no greater than 2-3/10  Pt will report STS transfers without pain              Plan  Progress hip and glute stabilization    Treatment Last 4 Visits       1/17/2025 1/30/2025 2/3/2025 2/10/2025   PT Treatment   Treatment Day   6 7   Therapeutic Exercise Gluteal Mob with Ball x 20 ea R/L  S/L Hip Abduction x 10 ea R/L  Figure 4 Piriformis Stretch 2 x 30\" ea R/L   Quadruped Hip Ext (Knee Flex) x 10 ea R/L   Quadruped Hip ER/Fire Hydrant x 10 ea R/L   Supine Gluteal Stretch 2 x 30\" ea R/L  Prone Hip Ext x 10 ea R/L   S/L Hip Abd 2 x 10 ea R/L   FR Glute Med/Max x 2 min ea R/L   Seated Figure 4 Piriformis Stretch 2 x 30\" ea R/L  Supine Figure 4 Piriformis 30\" ea R/L - feel more   H/L Gluteal Stretch 30\" ea R/L  Bird dog x10 B  Side planks 3x30s B --> reports some shoulder discomfort  Neutral curl up x10 B PPU x10  Bird dog x10 B  Neutral curl up x10 B   Manual Therapy Cupping (L S/L): (R) Lateral Hip, Glute Med/Max, Proximal ITBand, Anteriolateral hip  Cupping (R S/L): )L) Lateral Hip  Cupping (R S/L): Gluteals  Cupping (L S/L): Gluteals    Sacral mobilization into PPT 2x60s  STM/MFR to sacral region including deep trigger pt release to B hip rotators --> twitch response on L glute  Sacral mobilization into PPT 2x60s  STM/MFR to sacral region including deep trigger pt release to B hip rotators --> twitch response on L glute    Therapeutic Exercise Min 22 25 10 10   Manual Therapy Min 18 15 30 30   Total of Timed Procedures 40 40 40 40   Total of Service Based 0 0 0 0   Total Treatment Time 40 40 40 40         HEP  Access Code: [email or web address]  URL: [email or web address]  Date: 02/03/2025  Prepared by: Brittaney Castelan  - Clamshell  - 1 x daily - 4 x weekly - 2 sets - 20 reps  - Sidelying Reverse Clamshell  - 1 x daily - 4 x weekly - 2 sets - 20 reps  - Sidelying Hip Abduction  - 1 x daily - 4 x weekly - 3  sets - 10 reps  - Prone Hip Extension  - 1 x daily - 4 x weekly - 3 sets - 10 reps  - Gluteus Mobilization with Foam Roll  - 2 x daily - 7 x weekly - 1 sets - 2 min hold  - Supine Gluteus Stretch  - 2 x daily - 7 x weekly - 3 sets - 10 reps  - Bent Knee Fall Out with Ball   - 2-3 x daily - 7 x weekly - 1 sets - 20 reps  - Neutral Curl Up with Straight Leg  - 1 x daily - 7 x weekly - 3 sets - 10 reps  - Bird Dog  - 1 x daily - 7 x weekly - 3 sets - 10 reps    Charges     1 TE, 2 man

## 2025-02-14 ENCOUNTER — APPOINTMENT (OUTPATIENT)
Dept: PHYSICAL THERAPY | Age: 45
End: 2025-02-14
Attending: FAMILY MEDICINE
Payer: COMMERCIAL

## 2025-02-17 ENCOUNTER — APPOINTMENT (OUTPATIENT)
Dept: PHYSICAL THERAPY | Age: 45
End: 2025-02-17
Attending: FAMILY MEDICINE
Payer: COMMERCIAL

## 2025-02-23 DIAGNOSIS — G43.109 MIGRAINE WITH AURA AND WITHOUT STATUS MIGRAINOSUS, NOT INTRACTABLE: ICD-10-CM

## 2025-02-24 NOTE — TELEPHONE ENCOUNTER
Medication:  QULIPTA 60 MG Oral Tab      Date of last refill: 10/08/2024 (#90/1)  [place]     Last office visit: 10/08/2024  Due back to clinic per last office note:  Around 04/08/2025  Date next office visit scheduled:    Future Appointments   Date Time Provider Department Center   3/20/2025 11:00 AM Sheri Dillon MD [facility]   4/8/2025 10:20 AM Shayne Roland DO [id number]   6/16/2025 11:00 AM Sheri Dillon MD [facility]           Last OV note recommendation:    Assessment:  This is a 43 y/o female with a h/o migraines. She started Qulipta after our last visit and this ios working very well for her. Continue Qulipta and relpax for breakthrough.         Plan:  1. Migraine  - Continue Qulipta 60mg daily  - Continue relpax for breakthrough     Shayne Roland DO  Neurology

## 2025-02-25 RX ORDER — ATOGEPANT 60 MG/1
1 TABLET ORAL DAILY
Qty: 90 TABLET | Refills: 1 | Status: SHIPPED | OUTPATIENT
Start: 2025-02-25

## 2025-03-11 ENCOUNTER — OFFICE VISIT (OUTPATIENT)
Facility: CLINIC | Age: 45
End: 2025-03-11
Payer: COMMERCIAL

## 2025-03-11 VITALS — HEIGHT: 68 IN | BODY MASS INDEX: 25.01 KG/M2 | WEIGHT: 165 LBS

## 2025-03-11 DIAGNOSIS — S73.191D TEAR OF RIGHT ACETABULAR LABRUM, SUBSEQUENT ENCOUNTER: ICD-10-CM

## 2025-03-11 DIAGNOSIS — M25.552 BILATERAL HIP PAIN: ICD-10-CM

## 2025-03-11 DIAGNOSIS — M25.852 HIP IMPINGEMENT SYNDROME, LEFT: ICD-10-CM

## 2025-03-11 DIAGNOSIS — M25.551 BILATERAL HIP PAIN: ICD-10-CM

## 2025-03-11 DIAGNOSIS — M25.851 HIP IMPINGEMENT SYNDROME, RIGHT: Primary | ICD-10-CM

## 2025-03-11 PROCEDURE — 99213 OFFICE O/P EST LOW 20 MIN: CPT | Performed by: FAMILY MEDICINE

## 2025-03-11 PROCEDURE — 20611 DRAIN/INJ JOINT/BURSA W/US: CPT | Performed by: FAMILY MEDICINE

## 2025-03-11 RX ORDER — TRIAMCINOLONE ACETONIDE 40 MG/ML
40 INJECTION, SUSPENSION INTRA-ARTICULAR; INTRAMUSCULAR ONCE
Status: COMPLETED | OUTPATIENT
Start: 2025-03-11 | End: 2025-03-11

## 2025-03-11 RX ADMIN — TRIAMCINOLONE ACETONIDE 40 MG: 40 INJECTION, SUSPENSION INTRA-ARTICULAR; INTRAMUSCULAR at 11:54:00

## 2025-03-11 NOTE — PROGRESS NOTES
Sports Medicine Clinic Note    Subjective:    Chief Complaint: Bilateral hip pain    Date of Injury: Right hip since June 2019; Left hip since August 2024    History: 44-year-old female returns for follow-up regarding chronic bilateral hip pain. She has completed seven weeks of physical therapy but reports no significant improvement. She continues to experience pain with prolonged sitting, transitioning from sitting to standing, and laying on her side. However, she notes mild improvement in overall function and mobility. Given the lack of substantial relief from therapy, she wishes to proceed with hip injections today if available. No new neurologic symptoms or mechanical complaints.    Objective:    Body mass index is 25.09 kg/m².    Bilateral Hip Examination:    Inspection: No erythema, swelling, or gross deformities. Symmetrical gait without significant deviation.  Palpation: Persistent tenderness over the greater trochanter bilaterally, slightly improved compared to the last visit.  Range of Motion: Right hip: Pain with flexion beyond 90 degrees, internal rotation, and adduction, but less severe than prior exam. Left hip: Mild discomfort at end ranges, unchanged.  Neurovascular: Sensation intact bilaterally to light touch in the femoral and sciatic nerve distributions. Distal pulses remain palpable.  Special Tests: Positive FADIR bilaterally, though less pronounced than prior visit. Negative CHEMO bilaterally.    Diagnostic Tests:    No new testing.  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 Nondisplaced acetabular labral tear in the anterosuperior and posterosuperior quadrants. Small hip joint effusion. Mild insertional gluteus minimus tendinopathy.  Previous X-ray (Bilateral Hips): No fractures, arthropathy, or significant abnormalities.    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  Chronic bilateral hip pain, persistent despite physical therapy.  Gluteal tendinopathy, right hip, likely secondary to underlying hip mechanics rather than a primary pain generator.    Plan:    Procedures: Bilateral ultrasound-guided intra-articular hip injections with corticosteroid and anesthetic performed in-office today for diagnostic and therapeutic purposes.  Therapy: Patient completed PT trial with minimal benefit; will reassess based on injection response before considering further therapy.  Medications: Continue NSAIDs as needed for pain control. Muscle relaxant use may be tapered based on symptom relief.  Bracing/Casting: Not indicated.  Activity Recommendations: Continue to avoid high-impact activities and deep squatting. May resume low-impact exercises like cycling or swimming if pain allows.  Additional Workup: No further imaging at this time. Consider MRI of the left hip if symptoms persist and surgical planning is necessary.    Follow-Up: Tentatively scheduled in 3-4 weeks to assess response to injections and discuss next steps. Return sooner if symptoms worsen or new concerns arise.    - - -    Ultrasound Guided Procedure Note:    After discussion of the risks and benefits, the patient elected to proceed with an ultrasound guided injection into the femoroacetabular space, bilaterally. Confirmed that the patient does not have history of prior adverse reactions, active infections, or relevant allergies. There was no erythema or warmth, and the skin was clear.     For each side:    The skin was sterilized with ChloraPrep. A 22 gauge needle was inserted via inferolateral approach utilizing US for needle guidance and placement. The site was injected with a mixture of 1 mL of triamcinolone 40 mg/mL and 2 mL of 1% Lidocaine without Epinephrine. The injection was completed without complication, and a bandage was applied. The patient tolerated the procedure well and was  instructed to avoid strenuous activity for the next 24-48 hours and to use ice or Tylenol for pain as needed. The patient will call immediately with any signs of infection or allergic reaction.    Post-Injection Care: The patient tolerated the procedure well. An occlusive bandage was placed over the injection site. Post-injection care instructions provided to the patient. The patient was asked to avoid strenuous activity and continue to rest the area for 2-3 days before resuming regular activities. Patient advised that the area may be more painful for the first 1-2 days. They can use ice or Tylenol for pain as needed.  Patient was instructed to watch for fever, increased swelling, or persistent pain. The patient will call immediately with any signs of infection or allergic reaction.      Fermin Aviles DO, CAQSM   Primary Care Sports Medicine

## 2025-03-19 NOTE — PROGRESS NOTES
HISTORY OF PRESENT ILLNESS  Chief Complaint   Patient presents with    Weight Check     Down 7        Lisa Martin is a 44 year old female here for follow up in medical weight loss program.   [facility] Follow Up    General Information  Nutrition Recall  Exercise     Sleep              History of Present Illness  Lisa Martin is a 44 year old female who presents with Class II obesity and has had difficulty obtaining Zepbound medication.    She has been experiencing difficulty obtaining her Zepbound medication since the beginning of the year. Despite multiple contacts with the pharmacy, she is informed that a prior authorization is needed. Repeated attempts to complete the authorization process have been unsuccessful, leading her to ration her medication by splitting doses. Her prescription is still listed from November, and she has been off the medication for three months.    She has a history of obesity with a BMI of 35.13 in 2017. She is currently taking carvedilol and has a history of borderline elevated non-HDL cholesterol at 156. She has been on Zepbound for more than eight months and previously used Saxenda, which she preferred. She is concerned about the effectiveness of Zepbound due to her body's potential acclimation to GLP-1 medications.    She has bilateral labral tears in her hips due to cam impingement, causing significant pain. She recently received steroid injections, which provided relief for about a week before the pain returned.    She is currently taking sertraline 100 mg daily and has concerns about side effects such as fatigue and hair loss, would like trial of wellbutrin instead.    She has about two to three months' worth of sertraline at home. No cholesterol issues or fatty liver.    Wt Readings from Last 6 Encounters:   03/20/25 170 lb (77.1 kg)   03/11/25 165 lb (74.8 kg)   11/18/24 165 lb (74.8 kg)   11/06/24 177 lb (80.3 kg)   10/24/24 177 lb (80.3 kg)   10/08/24 180 lb 12.4 oz  (82 kg)              Breakfast Lunch Dinner Snacks Fluids   Reviewed           REVIEW OF SYSTEMS  GENERAL HEALTH: feels well otherwise, denied any fevers chills or night sweats   RESPIRATORY: denies shortness of breath   CARDIOVASCULAR: denies chest pain  GI: denies abdominal pain    EXAM  /84   Pulse 87   Resp 20   Ht 5' 8\" (1.727 m)   Wt 170 lb (77.1 kg)   BMI 25.85 kg/m²   GENERAL: well developed, well nourished,in no apparent distress, A/O x3  SKIN: no rashes,no suspicious lesions  HEENT: atraumatic, normocephalic, OP-clear, PERRL  NECK: supple,no adenopathy  LUNGS: clear to auscultation bilaterally   CARDIO: RRR without murmur  GI: good BS's,NT/ND, no masses or HSM  EXTREMITIES: no cyanosis, no clubbing, no edema    Lab Results   Component Value Date    WBC 6.8 04/23/2024    RBC 4.28 04/23/2024    HGB 14.1 04/23/2024    HCT 39.3 04/23/2024    MCV 91.8 04/23/2024    MCH 32.9 04/23/2024    MCHC 35.9 04/23/2024    RDW 12.4 04/23/2024    .0 04/23/2024     Lab Results   Component Value Date    GLU 96 04/23/2024    BUN 10 04/23/2024    BUNCREA 17.9 07/13/2021    CREATSERUM 0.72 04/23/2024    ANIONGAP 4 04/23/2024    GFRNAA 112 07/13/2021    GFRAA 129 07/13/2021    CA 8.9 04/23/2024    OSMOCALC 289 04/23/2024    ALKPHO 69 04/23/2024    AST 7 (L) 04/23/2024    ALT 19 04/23/2024    BILT 1.3 04/23/2024    TP 7.2 04/23/2024    ALB 3.9 04/23/2024    GLOBULIN 3.3 04/23/2024    AGRATIO 1.8 06/26/2012     04/23/2024    K 4.3 04/23/2024     04/23/2024    CO2 27.0 04/23/2024     No results found for: \"EAG\", \"A1C\"  Lab Results   Component Value Date    CHOLEST 221 (H) 04/23/2024    TRIG 80 04/23/2024    HDL 65 (H) 04/23/2024     (H) 04/23/2024    VLDL 15 04/23/2024    TCHDLRATIO 2.8 03/16/2011    NONHDLC 156 (H) 04/23/2024     Lab Results   Component Value Date    T4F 0.9 07/13/2021    TSH 1.020 04/23/2024     Lab Results   Component Value Date    B12 565 07/13/2021     Lab Results    Component Value Date    VITD 38.3 07/13/2021       Medications Ordered Prior to Encounter[1]    ASSESSMENT  Analyzed weight data:  Weight Calculations  Initial Weight: 207 lbs  Initial Weight Date: 05/01/19  Today's Weight: 170 lbs  5% Goal: 10.35  10% Goal: 20.7  Total Weight Loss: 37 lbs  Title    [facility] Information                                Have any comorbidities improved since the last visit?:        Hypertension DM 2 CAD Dyslipidemia Osteoarthritis Sleep Apnea              Diagnoses and all orders for this visit:    Therapeutic drug monitoring  -     Discontinue: Tirzepatide-Weight Management (ZEPBOUND) 12.5 MG/0.5ML Subcutaneous Solution Auto-injector; Inject 12.5 mg into the skin once a week for 4 doses.  -     Tirzepatide-Weight Management (ZEPBOUND) 15 MG/0.5ML Subcutaneous Solution Auto-injector; Inject 15 mg into the skin once a week for 4 doses.    Obesity (BMI 30-39.9)  -     Discontinue: Tirzepatide-Weight Management (ZEPBOUND) 12.5 MG/0.5ML Subcutaneous Solution Auto-injector; Inject 12.5 mg into the skin once a week for 4 doses.  -     Tirzepatide-Weight Management (ZEPBOUND) 15 MG/0.5ML Subcutaneous Solution Auto-injector; Inject 15 mg into the skin once a week for 4 doses.    PVC (premature ventricular contraction)  -     Tirzepatide-Weight Management (ZEPBOUND) 15 MG/0.5ML Subcutaneous Solution Auto-injector; Inject 15 mg into the skin once a week for 4 doses.    Dyslipidemia  -     Tirzepatide-Weight Management (ZEPBOUND) 15 MG/0.5ML Subcutaneous Solution Auto-injector; Inject 15 mg into the skin once a week for 4 doses.    Labral tear of hip, degenerative  -     Tirzepatide-Weight Management (ZEPBOUND) 15 MG/0.5ML Subcutaneous Solution Auto-injector; Inject 15 mg into the skin once a week for 4 doses.    Essential hypertension  -     Tirzepatide-Weight Management (ZEPBOUND) 15 MG/0.5ML Subcutaneous Solution Auto-injector; Inject 15 mg into the skin once a week for 4  doses.    Anxiety    Other orders  -     buPROPion  MG Oral Tablet 24 Hr; Take 1 tablet (150 mg total) by mouth daily.        PLAN  Assessment & Plan  Obesity  Obesity with BMI >35 since 2017.   - Submit prior authorization for Zepbound 15 mg to [place] pharmacy.  - Advise application of savings card via [employer] website to reduce copay.  - Instruct follow-up within a week if no update on prior authorization.    Anxiety   Discussed switching from sertraline to bupropion for weight loss and craving reduction. Caution with alcohol due to seizure risk.  - Taper sertraline: 50 mg daily for 2 weeks, then 50 mg every other day for 2 weeks.  - Start bupropion extended release 150 mg once daily post-sertraline taper.  - Advise caution with alcohol intake on bupropion.    Arrhythmia  Managed with carvedilol. Considered for Zepbound coverage.    Bilateral labral tear  Reviewed limitations of physical activity     Follow-up  Scheduled video visit in June and in-person visit in six months to monitor medication regimen.  - Maintain scheduled video visit in June.  - Schedule in-person follow-up in six months.    Patient Instructions   50 mg daily x 2 weeks   Then 50 mg every other day x 2 weeks     Return in about 8 weeks (around 5/15/2025).    Patient verbalizes understanding.    Sheri Dillon MD           [1]   Current Outpatient Medications on File Prior to Visit   Medication Sig Dispense Refill    Atogepant (QULIPTA) 60 MG Oral Tab Take 1 tablet by mouth daily. 90 tablet 1    methocarbamol 750 MG Oral Tab Take 1 tablet (750 mg total) by mouth nightly as needed. 30 tablet 0    CARVEDILOL 3.125 MG Oral Tab TAKE 1 TABLET BY MOUTH TWICE A DAY WITH MEALS 180 tablet 2    ALPRAZolam 0.25 MG Oral Tab Take 1 tablet (0.25 mg total) by mouth nightly as needed for Anxiety. 10 tablet 0    ondansetron 4 MG Oral Tablet Dispersible Take 1 tablet (4 mg total) by mouth every 8 (eight) hours as needed for Nausea.      Eletriptan Hydrobromide  40 MG Oral Tab TAKE 1 TABLET BY MOUTH AT ONSET AND MAY REPEAT IF NEEDED AFTER 2 HOURS FOR A MAX OF 2 PILLS IN 24 HOURS 10 tablet 3    Docusate Sodium (COLACE OR) Take by mouth.      levonorgestrel 20 MCG/24HR Intrauterine IUD 20 mcg (1 each total) by Intrauterine route once.       No current facility-administered medications on file prior to visit.

## 2025-03-20 ENCOUNTER — OFFICE VISIT (OUTPATIENT)
Dept: INTERNAL MEDICINE CLINIC | Facility: CLINIC | Age: 45
End: 2025-03-20
Payer: COMMERCIAL

## 2025-03-20 VITALS
SYSTOLIC BLOOD PRESSURE: 120 MMHG | WEIGHT: 170 LBS | RESPIRATION RATE: 20 BRPM | HEART RATE: 87 BPM | HEIGHT: 68 IN | BODY MASS INDEX: 25.76 KG/M2 | DIASTOLIC BLOOD PRESSURE: 84 MMHG

## 2025-03-20 DIAGNOSIS — I10 ESSENTIAL HYPERTENSION: ICD-10-CM

## 2025-03-20 DIAGNOSIS — I49.3 PVC (PREMATURE VENTRICULAR CONTRACTION): ICD-10-CM

## 2025-03-20 DIAGNOSIS — M24.159 LABRAL TEAR OF HIP, DEGENERATIVE: ICD-10-CM

## 2025-03-20 DIAGNOSIS — E78.5 DYSLIPIDEMIA: ICD-10-CM

## 2025-03-20 DIAGNOSIS — Z51.81 THERAPEUTIC DRUG MONITORING: Primary | ICD-10-CM

## 2025-03-20 DIAGNOSIS — F41.9 ANXIETY: ICD-10-CM

## 2025-03-20 DIAGNOSIS — E66.9 OBESITY (BMI 30-39.9): ICD-10-CM

## 2025-03-20 PROCEDURE — 99214 OFFICE O/P EST MOD 30 MIN: CPT | Performed by: INTERNAL MEDICINE

## 2025-03-20 RX ORDER — TIRZEPATIDE 12.5 MG/.5ML
INJECTION, SOLUTION SUBCUTANEOUS
COMMUNITY
Start: 2024-11-29 | End: 2025-03-20

## 2025-03-20 RX ORDER — BUPROPION HYDROCHLORIDE 150 MG/1
150 TABLET ORAL DAILY
Qty: 30 TABLET | Refills: 2 | Status: SHIPPED | OUTPATIENT
Start: 2025-03-20

## 2025-03-20 RX ORDER — TIRZEPATIDE 15 MG/.5ML
15 INJECTION, SOLUTION SUBCUTANEOUS WEEKLY
Qty: 2 ML | Refills: 2 | Status: SHIPPED | OUTPATIENT
Start: 2025-03-20 | End: 2025-04-11

## 2025-03-20 RX ORDER — TIRZEPATIDE 12.5 MG/.5ML
12.5 INJECTION, SOLUTION SUBCUTANEOUS WEEKLY
Qty: 2 ML | Refills: 2 | Status: SHIPPED | OUTPATIENT
Start: 2025-03-20 | End: 2025-03-20

## 2025-03-20 NOTE — PROGRESS NOTES
The following individual(s) verbally consented to be recorded using ambient AI listening technology and understand that they can each withdraw their consent to this listening technology at any point by asking the clinician to turn off or pause the recording:    Patient name: Lisa Martin  Additional names:  N/A

## 2025-03-21 ENCOUNTER — PATIENT MESSAGE (OUTPATIENT)
Facility: CLINIC | Age: 45
End: 2025-03-21

## 2025-03-21 NOTE — TELEPHONE ENCOUNTER
Thanks Rosalba I'm not sure if there is a triage nursing protocol somewhere about corticosteroid shots but generally if a patient is calling back about suboptimal improvement less than 2 weeks from a shot they should be getting education about typical time frames for improvement. It can take longer for the full affects to kick in. I would have her follow up in 2-4 weeks from the shot to formally assess response

## 2025-03-21 NOTE — TELEPHONE ENCOUNTER
Left a message on identifying voicemail on steroid injection education as below, also sent UBIKOD message.   Cortisone Injections    What is Cortisone?        Cortisone is the strongest anti-inflammatory agent available, it is used to decrease pain and reduce localized swelling. A cortisone injection is similar to taking an anti-inflammatory by mouth (Advil or Aspirin) and injecting it locally through a needle directly into a joint.     What to expect:         Occasionally individuals may experience some skin thinning/discoloration locally at the site of the injection.      Temporary bruising or a collection of blood under the skin      Flushing of the face for a few hours- this is not an allergic reaction    It is recommended that you refrain from any high level activities using the joint that was injected for approximately 48 hours. Routine activities including walking are permitted.There is also the possibility of an increase in discomfort within 48 hours of the injection. This is called a “steroid flare-up.” To help avoid this, abide by the activity restrictions above.    If you experience any discomfort following the injections, apply ice to the area. Put the ice over the area where you are feeling discomfort for 10 minutes every hour as needed for pain. You may continue to use ice for 3 days, as needed. Lidocaine or Marcaine are anesthetics or  \"numbing medications\" (like Novocaine) which could be used in your injection. Please note you may experience an increase in pain after the numbness wears off. This may last for several days.     Relief from any injection may be temporary or permanent. It may take up to 1 month for you to see a positive effect from the cortisone injection. Tylenol and ibuprofen may be taken for pain relief as directed by your doctor unless you are allergic to these over-the -counter medications.    You should contact [facility], if you notice any of the following signs or  symptoms:     Redness of the joint   Increased warmth of the entire joint   Drainage from the injection site   Fever over 100 degrees

## 2025-04-06 ENCOUNTER — PATIENT MESSAGE (OUTPATIENT)
Dept: NEUROLOGY | Facility: CLINIC | Age: 45
End: 2025-04-06

## 2025-04-08 ENCOUNTER — TELEMEDICINE (OUTPATIENT)
Dept: NEUROLOGY | Facility: CLINIC | Age: 45
End: 2025-04-08
Payer: COMMERCIAL

## 2025-04-08 DIAGNOSIS — G43.009 MIGRAINE WITHOUT AURA AND WITHOUT STATUS MIGRAINOSUS, NOT INTRACTABLE: Primary | ICD-10-CM

## 2025-04-08 PROCEDURE — 98005 SYNCH AUDIO-VIDEO EST LOW 20: CPT | Performed by: OTHER

## 2025-04-08 NOTE — PROGRESS NOTES
Neurology H&P    Lisa Martin Patient Status:  No patient class for patient encounter    1980 MRN [id number]   Location [facility] Attending No att. providers found   Hosp Day # 0 PCP Daily Urena MD     Subjective:  Initial Clinic HPI 22  Lisa Martin is a(n) 44 year old female with a PMH significant for migraines. She has previously seen my colleague Dr. Sorto and Dr. Talavera. She is currently taking Topamax 50mg pm for migraine prophylaxsis. She states that she only gets 2-3 migraines per year. She takes relpax for migraine relief and she reports that this works well to help abort her migraines if she takes it in time. She has only had one migraine in the past 6 months. Her migraines are preceded by a visual aura. Wavy lines in her vision for about 20 minutes prior to the migraine. She is on an estrogen containing BC product as well.     Interim History:   Pt was last seen in the clinic on 10/8/24. Since that time she has been taking Qulipta 60mg daily for her migraines.  She states that she is doing amazing. She has not had any migraines in about 4 months. She has not needed to use ay rescue       Current Medications:  Current Outpatient Medications   Medication Sig Dispense Refill    buPROPion  MG Oral Tablet 24 Hr Take 1 tablet (150 mg total) by mouth daily. 30 tablet 2    Tirzepatide-Weight Management (ZEPBOUND) 15 MG/0.5ML Subcutaneous Solution Auto-injector Inject 15 mg into the skin once a week for 4 doses. 2 mL 2    Atogepant (QULIPTA) 60 MG Oral Tab Take 1 tablet by mouth daily. 90 tablet 1    methocarbamol 750 MG Oral Tab Take 1 tablet (750 mg total) by mouth nightly as needed. 30 tablet 0    CARVEDILOL 3.125 MG Oral Tab TAKE 1 TABLET BY MOUTH TWICE A DAY WITH MEALS 180 tablet 2    ALPRAZolam 0.25 MG Oral Tab Take 1 tablet (0.25 mg total) by mouth nightly as needed for Anxiety. 10 tablet 0    ondansetron 4 MG Oral Tablet Dispersible Take 1 tablet (4 mg  total) by mouth every 8 (eight) hours as needed for Nausea.      Eletriptan Hydrobromide 40 MG Oral Tab TAKE 1 TABLET BY MOUTH AT ONSET AND MAY REPEAT IF NEEDED AFTER 2 HOURS FOR A MAX OF 2 PILLS IN 24 HOURS 10 tablet 3    Docusate Sodium (COLACE OR) Take by mouth.      levonorgestrel 20 MCG/24HR Intrauterine IUD 20 mcg (1 each total) by Intrauterine route once.         Problem List:  Patient Active Problem List   Diagnosis    Other abnormal blood chemistry    Migraine    Lumbago    Urinary tract infection, site not specified    Urinary frequency    Palpitations    Screening for thyroid disorder    Screening for lipoid disorders    Iron deficiency anemia, unspecified    Supervision of other normal pregnancy, antepartum (HCC)    Pregnancy (HCC)    Pregnant (HCC)    Anxiety    Memory loss    Arrhythmia    Pain of right hip       PMHx:  Past Medical History:    Anxiety    Zoloft daily    Arrhythmia    chronic pvc    Depression    Migraines    PVCs (premature ventricular contractions)    Chronic; Pt currently taking Toprol XL daily    Rotator cuff tear    Left shoulder       PSHx:  Past Surgical History:   Procedure Laterality Date    Anesth, section  2017    Oral surgery procedure      [place] teeth extraction - no complications    Upper gi endoscopy,exam      EGD       SocHx:  Social History     Socioeconomic History    Marital status:    Tobacco Use    Smoking status: Former     Current packs/day: 0.00     Types: Cigarettes     Start date: 2003     Quit date: 2013     Years since quittin.9    Smokeless tobacco: Never    Tobacco comments:     Updated 24   Vaping Use    Vaping status: Never Used   Substance and Sexual Activity    Alcohol use: Yes     Alcohol/week: 1.0 standard drink of alcohol     Comment: Socially    Drug use: Never    Sexual activity: Yes   Other Topics Concern    Caffeine Concern Yes    Stress Concern No    Weight Concern No    Special Diet No    Exercise  Yes    Seat Belt Yes       Family History:  Family History   Problem Relation Age of Onset    Bipolar Disorder Father     Depression Father     Diabetes Father     Alcohol and Other Disorders Associated Father     Hypertension Father     Other (Alcoholism) Father     Arthritis Maternal Grandfather     Diabetes Maternal Grandmother     Stroke Maternal Grandmother     Other (Elevated C-reaction Protein) Sister     Hypertension Mother     Thyroid Disorder Mother     Other (Other) Mother     Lung Disorder Paternal Grandfather     Other (Pulmonary Fibrosis) Paternal Grandfather     Arthritis Paternal Grandmother     Other (Endometriosis) Sister     Other (Rectal Prolapse) Sister             ROS:  10 point ROS completed and was negative, except for pertinent positive and negatives stated in subjective.    Objective/Physical Exam:    Vital Signs:  not currently breastfeeding.    Gen: Awake and in no apparent distress  HEENT: moist mucus membranes  Neck: Supple  Cardiovascular: Regular rate and rhythm, no murmur  Pulm: CTAB  GI: non-tender, normal bowel sounds  Skin: normal, dry  Extremities: No clubbing or cyanosis      Neurologic:   MENTAL STATUS: alert, ox3, normal attention, language and fund of knowledge.      CRANIAL NERVES II to XII: PERRLA, no ptosis or diplopia, EOM intact, facial sensation intact, strong eye closure, face is symmetric, no dysarthria, tongue midline,  no tongue fasciculations or atrophy, strong shoulder shrug.    MOTOR EXAMINATION: normal tone, no fasciculations, normal strength throughout in UEs and LEs      SENSORY EXAMINATION:  UE: intact to light touch, pinprick intact  LE: intact to light touch, pinprick intact    COORDINATION:  No dysmetria, or intention tremors     REFLEXES: 2+ at biceps, 2+ brachioradialis, 2+ at patella, 2+ at the ankles     GAIT: normal stance, normal gait    Romberg's: negative        Labs:       Imaging:  MRI Brain 7/2012    FINDINGS:     The midline structures of the  brain including the        pituitary have normal appearance.             No acute cortical or brainstem infarct.  No hydrocephalus or        midline shift.                      No enhancing brain mass.                            No significant disease in the visualized paranasal sinuses.                                   CONCLUSION:     Normal exam.              Assessment:  This is a 43 y/o female with a h/o migraines. She started Qulipta after our last visit and this is working very well for her. Continue Qulipta and relpax for breakthrough.       Plan:  1. Migraine  - Continue Qulipta 60mg daily  - Continue relpax for breakthrough     Time: 10 min.      Please note that the following visit was completed using two-way, real-time interactive audio and video communication.  This has been done in good brody to provide continuity of care in the best interest of the provider-patient relationship, due to the ongoing public health crisis/national emergency and because of restrictions of visitation.  There are limitations of this visit as no physical exam could be performed.  Every conscious effort was taken to allow for sufficient and adequate time.  This billing was spent on reviewing labs, medications, radiology tests and decision making.  Appropriate medical decision-making and tests are ordered as detailed in the plan of care above.”      Shayne Roland, DO  Neurology

## 2025-04-17 ENCOUNTER — PATIENT MESSAGE (OUTPATIENT)
Dept: INTERNAL MEDICINE CLINIC | Facility: CLINIC | Age: 45
End: 2025-04-17

## 2025-04-21 ENCOUNTER — PATIENT MESSAGE (OUTPATIENT)
Dept: INTERNAL MEDICINE CLINIC | Facility: CLINIC | Age: 45
End: 2025-04-21

## 2025-04-22 NOTE — TELEPHONE ENCOUNTER
Yes we could do cash pay but it would be at 499 per month   Otherwise consider of restart stimulant would require OV   Ok for CS  I know its frustrating!

## 2025-04-29 ENCOUNTER — OFFICE VISIT (OUTPATIENT)
Dept: INTERNAL MEDICINE CLINIC | Facility: CLINIC | Age: 45
End: 2025-04-29
Payer: COMMERCIAL

## 2025-04-29 VITALS
DIASTOLIC BLOOD PRESSURE: 80 MMHG | OXYGEN SATURATION: 99 % | SYSTOLIC BLOOD PRESSURE: 110 MMHG | RESPIRATION RATE: 16 BRPM | HEART RATE: 74 BPM | WEIGHT: 179 LBS | BODY MASS INDEX: 27.13 KG/M2 | HEIGHT: 68 IN

## 2025-04-29 DIAGNOSIS — E66.9 OBESITY (BMI 30-39.9): ICD-10-CM

## 2025-04-29 DIAGNOSIS — R63.2 BINGE EATING: ICD-10-CM

## 2025-04-29 DIAGNOSIS — I10 ESSENTIAL HYPERTENSION: ICD-10-CM

## 2025-04-29 DIAGNOSIS — F41.9 ANXIETY: ICD-10-CM

## 2025-04-29 DIAGNOSIS — I49.3 PVC (PREMATURE VENTRICULAR CONTRACTION): ICD-10-CM

## 2025-04-29 DIAGNOSIS — E78.5 DYSLIPIDEMIA: ICD-10-CM

## 2025-04-29 DIAGNOSIS — Z51.81 THERAPEUTIC DRUG MONITORING: Primary | ICD-10-CM

## 2025-04-29 PROCEDURE — 99214 OFFICE O/P EST MOD 30 MIN: CPT | Performed by: NURSE PRACTITIONER

## 2025-04-29 RX ORDER — PHENTERMINE HYDROCHLORIDE 15 MG/1
15 CAPSULE ORAL EVERY MORNING
Qty: 30 CAPSULE | Refills: 2 | Status: SHIPPED | OUTPATIENT
Start: 2025-04-29

## 2025-04-29 RX ORDER — BUPROPION HYDROCHLORIDE 150 MG/1
150 TABLET ORAL DAILY
Qty: 30 TABLET | Refills: 2 | Status: CANCELLED | OUTPATIENT
Start: 2025-04-29

## 2025-04-29 RX ORDER — NALTREXONE HYDROCHLORIDE 50 MG/1
25 TABLET, FILM COATED ORAL DAILY
Qty: 30 TABLET | Refills: 1 | Status: SHIPPED | OUTPATIENT
Start: 2025-04-29

## 2025-04-29 RX ORDER — BUPROPION HYDROCHLORIDE 150 MG/1
150 TABLET ORAL DAILY
Qty: 30 TABLET | Refills: 2 | Status: SHIPPED | OUTPATIENT
Start: 2025-04-29

## 2025-04-29 NOTE — PROGRESS NOTES
HISTORY OF PRESENT ILLNESS  Chief Complaint   Patient presents with    Weight Check     Up 9#     Lisa Martin is a 44 year old female here for follow up in medical weight loss program.     Patient states she recently was denied Zepbound coverage and can no longer take the medication.  Patient states she would like to try different medication if possible.  Pt states her primary prescriber told her to possibly think about taking a stimulant.  Pt states she has been coming to the weight loss clinic for a long time now, has tried a lot of other medications in the past.  Pt states is willing to try anything that can help.    Wt Readings from Last 6 Encounters:   04/29/25 179 lb (81.2 kg)   03/20/25 170 lb (77.1 kg)   03/11/25 165 lb (74.8 kg)   11/18/24 165 lb (74.8 kg)   11/06/24 177 lb (80.3 kg)   10/24/24 177 lb (80.3 kg)        Breakfast Lunch Dinner Snacks Fluids   Reviewed         REVIEW OF SYSTEMS  GENERAL HEALTH: feels well otherwise, denied any fevers chills or night sweats   RESPIRATORY: denies shortness of breath   CARDIOVASCULAR: denies chest pain  GI: denies abdominal pain    EXAM  /80   Pulse 74   Resp 16   Ht 5' 8\" (1.727 m)   Wt 179 lb (81.2 kg)   LMP  (LMP Unknown)   SpO2 99%   BMI 27.22 kg/m²   GENERAL: well developed, well nourished,in no apparent distress, A/O x3  SKIN: no rashes,no suspicious lesions  HEENT: atraumatic, normocephalic, OP-clear, PERRL  NECK: supple,no adenopathy  LUNGS: clear to auscultation bilaterally   CARDIO: RRR without murmur  GI: good BS's,NT/ND, no masses or HSM  EXTREMITIES: no cyanosis, no clubbing, no edema    Lab Results   Component Value Date    WBC 6.8 04/23/2024    RBC 4.28 04/23/2024    HGB 14.1 04/23/2024    HCT 39.3 04/23/2024    MCV 91.8 04/23/2024    MCH 32.9 04/23/2024    MCHC 35.9 04/23/2024    RDW 12.4 04/23/2024    .0 04/23/2024     Lab Results   Component Value Date    GLU 96 04/23/2024    BUN 10 04/23/2024    BUNCREA 17.9  07/13/2021    CREATSERUM 0.72 04/23/2024    ANIONGAP 4 04/23/2024    GFRNAA 112 07/13/2021    GFRAA 129 07/13/2021    CA 8.9 04/23/2024    OSMOCALC 289 04/23/2024    ALKPHO 69 04/23/2024    AST 7 (L) 04/23/2024    ALT 19 04/23/2024    BILT 1.3 04/23/2024    TP 7.2 04/23/2024    ALB 3.9 04/23/2024    GLOBULIN 3.3 04/23/2024    AGRATIO 1.8 06/26/2012     04/23/2024    K 4.3 04/23/2024     04/23/2024    CO2 27.0 04/23/2024     No results found for: \"EAG\", \"A1C\"  Lab Results   Component Value Date    CHOLEST 221 (H) 04/23/2024    TRIG 80 04/23/2024    HDL 65 (H) 04/23/2024     (H) 04/23/2024    VLDL 15 04/23/2024    TCHDLRATIO 2.8 03/16/2011    NONHDLC 156 (H) 04/23/2024     Lab Results   Component Value Date    T4F 0.9 07/13/2021    TSH 1.020 04/23/2024     Lab Results   Component Value Date    B12 565 07/13/2021     Lab Results   Component Value Date    VITD 38.3 07/13/2021       Medications Ordered Prior to Encounter[1]    ASSESSMENT  Analyzed weight data:     [facility] Information  Patient type: Lifestyle   Date of Initial Weight: 6/3/2019 Initial Weight: 207 Today's Weight: 179   Weightloss to Date: 28   Weightloss Percentage: 13.53 5% Goal: 10.35 10% Goal: 20.7     Today's BMI: 27.22 Change in BMI: -27.22       Have any comorbidities improved since the last visit?   Hypertension DM 2 Dyslipidemia Osteoarthritis Sleep Apnea               Any side complications or side effects from obesity medicine treatment?:   No            Diagnoses and all orders for this visit:    Therapeutic drug monitoring  -     buPROPion  MG Oral Tablet 24 Hr; Take 1 tablet (150 mg total) by mouth daily.  -     naltrexone 50 MG Oral Tab; Take 0.5 tablets (25 mg total) by mouth daily.  -     Phentermine HCl 15 MG Oral Cap; Take 1 capsule (15 mg total) by mouth every morning.  -     EKG 12 Lead performed at [facility]; Future    Obesity (BMI 30-39.9)  -     buPROPion  MG Oral Tablet 24 Hr; Take 1 tablet (150  mg total) by mouth daily.  -     naltrexone 50 MG Oral Tab; Take 0.5 tablets (25 mg total) by mouth daily.  -     Phentermine HCl 15 MG Oral Cap; Take 1 capsule (15 mg total) by mouth every morning.  -     EKG 12 Lead performed at [facility]; Future    PVC (premature ventricular contraction)  -     buPROPion  MG Oral Tablet 24 Hr; Take 1 tablet (150 mg total) by mouth daily.  -     naltrexone 50 MG Oral Tab; Take 0.5 tablets (25 mg total) by mouth daily.  -     Phentermine HCl 15 MG Oral Cap; Take 1 capsule (15 mg total) by mouth every morning.  -     EKG 12 Lead performed at [facility]; Future    Dyslipidemia  -     buPROPion  MG Oral Tablet 24 Hr; Take 1 tablet (150 mg total) by mouth daily.  -     naltrexone 50 MG Oral Tab; Take 0.5 tablets (25 mg total) by mouth daily.  -     Phentermine HCl 15 MG Oral Cap; Take 1 capsule (15 mg total) by mouth every morning.  -     EKG 12 Lead performed at [facility]; Future    Essential hypertension  -     buPROPion  MG Oral Tablet 24 Hr; Take 1 tablet (150 mg total) by mouth daily.  -     naltrexone 50 MG Oral Tab; Take 0.5 tablets (25 mg total) by mouth daily.  -     Phentermine HCl 15 MG Oral Cap; Take 1 capsule (15 mg total) by mouth every morning.  -     EKG 12 Lead performed at [facility]; Future    Binge eating  -     buPROPion  MG Oral Tablet 24 Hr; Take 1 tablet (150 mg total) by mouth daily.  -     naltrexone 50 MG Oral Tab; Take 0.5 tablets (25 mg total) by mouth daily.  -     Phentermine HCl 15 MG Oral Cap; Take 1 capsule (15 mg total) by mouth every morning.  -     EKG 12 Lead performed at [facility]; Future    Anxiety  -     buPROPion  MG Oral Tablet 24 Hr; Take 1 tablet (150 mg total) by mouth daily.  -     naltrexone 50 MG Oral Tab; Take 0.5 tablets (25 mg total) by mouth daily.  -     Phentermine HCl 15 MG Oral Cap; Take 1 capsule (15 mg total) by mouth every morning.  -     EKG 12 Lead performed at [facility]; Future      PLAN  Initial consult: 207 lb on 6/3/19   Down 28lbs today from initial consult  Obesity:  Pt no longer has insurance coverage for Zepbound  Pt was placed on Wellbutrin 150mg at last visit last month and doing well with medication  Pt wants to try adding Naltrexone which would mimic Contrave medication  Pt has never tried Phentermine in the past but has been on Vyvanse and Diethylpropion   After discussion with pt about Phentermine and pt's known PVC history and HTN, mutual patient centered decision made for pt to try low dose Phentermine short-term for appetite suppression and metabolism increase - pt will monitor HR and BP for any negative side effects. Pt will get EKG done as well. Pt will stop medication if for any reason she doesn't feel well on it.  Pt has f/u with primary prescribed in June   PHENTERMINE: Since the patient would like to try phentermine, and is aware of the potential side effects (hypertension, palpitations, tachycardia, and anxiety), I will give Lisa Martin a prescription today to be used in conjunction with the above diet and exercise program. The patient will check her heart rate and blood pressure on a regular basis. She will call me if her BP goes over 140/90 or if she has palpitations or racing heart rate. She understands that I will not call in the prescription for her; she has to have an appointment to have the medication refilled.    Regarding weight loss medications.  I've discussed with patients the risks and benefits of such weight loss medications.  That these are optional treatments to be used in conjunction with diet and exercise for promoting health and weight loss.  We discussed the optional nature of these treatments, including all the risks noted on the drug label, and the unknown potential for cardiovascular disease both now or in the future is uncertain and the patient is aware and chooses to continue the medication aware of those risks to  their body.  Also that the patient isn't and won't get pregnant(if this patient is female) while being on weight loss medications.  Risk of teratogenicity, heart problems, and changes to mood, constipation and vision trouble discussed with patient.  We discussed the below from up to date.  Contraindications   Hypersensitivity or idiosyncrasy to phentermine or other sympathomimetic amines or any component of the formulation; history of cardiovascular disease (arrhythmias, congestive heart failure, coronary artery disease, stroke, uncontrolled hypertension); hyperthyroidism, glaucoma, agitated states, history of drug abuse; use during or within 14 days following MAO inhibitor therapy; pregnancy, breast-feeding  Continue associated lifestyle changes  Nutrition: low carb diet/ recommended to eat breakfast daily/ regular protein intake  Medication use and side effects reviewed with patient.    Medications tried in the past: Diethylpropion, Saxenda, Topiramate, Zepbound, Vyvanse  Follow up with dietitian and psychologist as recommended.  Discussed the role of sleep and stress in weight management.  Counseled on comprehensive weight loss plan including attention to nutrition, exercise and behavior/stress management for success. See patient instruction below for more details.  Discussed strategies to overcome barriers to successful weight loss and weight maintenance  FITTE: ACSM recommendations (150-300 minutes/ week in active weight loss)   Weight Loss consent to treat reviewed and signed    There are no Patient Instructions on file for this visit.    No follow-ups on file.    Patient verbalizes understanding.    Keri Jose, DINESH, FNP-BC        [1]   Current Outpatient Medications on File Prior to Visit   Medication Sig Dispense Refill    Atogepant (QULIPTA) 60 MG Oral Tab Take 1 tablet by mouth daily. 90 tablet 1    methocarbamol 750 MG Oral Tab Take 1 tablet (750 mg total) by mouth nightly as needed. 30 tablet 0     CARVEDILOL 3.125 MG Oral Tab TAKE 1 TABLET BY MOUTH TWICE A DAY WITH MEALS 180 tablet 2    ALPRAZolam 0.25 MG Oral Tab Take 1 tablet (0.25 mg total) by mouth nightly as needed for Anxiety. 10 tablet 0    ondansetron 4 MG Oral Tablet Dispersible Take 1 tablet (4 mg total) by mouth every 8 (eight) hours as needed for Nausea.      Eletriptan Hydrobromide 40 MG Oral Tab TAKE 1 TABLET BY MOUTH AT ONSET AND MAY REPEAT IF NEEDED AFTER 2 HOURS FOR A MAX OF 2 PILLS IN 24 HOURS 10 tablet 3    Docusate Sodium (COLACE OR) Take by mouth.      levonorgestrel 20 MCG/24HR Intrauterine IUD 20 mcg (1 each total) by Intrauterine route once.       No current facility-administered medications on file prior to visit.

## 2025-04-30 ENCOUNTER — TELEPHONE (OUTPATIENT)
Dept: INTERNAL MEDICINE CLINIC | Facility: CLINIC | Age: 45
End: 2025-04-30

## 2025-04-30 DIAGNOSIS — Z13.0 SCREENING FOR BLOOD DISEASE: ICD-10-CM

## 2025-04-30 DIAGNOSIS — Z13.220 SCREENING FOR LIPID DISORDERS: ICD-10-CM

## 2025-04-30 DIAGNOSIS — Z13.228 SCREENING FOR METABOLIC DISORDER: ICD-10-CM

## 2025-04-30 DIAGNOSIS — Z13.29 SCREENING FOR THYROID DISORDER: ICD-10-CM

## 2025-04-30 DIAGNOSIS — Z00.00 ROUTINE GENERAL MEDICAL EXAMINATION AT A HEALTH CARE FACILITY: Primary | ICD-10-CM

## 2025-04-30 NOTE — TELEPHONE ENCOUNTER
Patient called request labs prior to their annual physical.  Annual physical scheduled for 4/30/25 .   Please order labs. Patient preferred lab is [facility]  Patient informed request was sent to clinical team.  Patient informed to fast for labs.  No callback required.

## 2025-05-12 ENCOUNTER — NURSE TRIAGE (OUTPATIENT)
Dept: INTERNAL MEDICINE CLINIC | Facility: CLINIC | Age: 45
End: 2025-05-12

## 2025-05-12 ENCOUNTER — TELEPHONE (OUTPATIENT)
Facility: LOCATION | Age: 45
End: 2025-05-12

## 2025-05-12 ENCOUNTER — OFFICE VISIT (OUTPATIENT)
Dept: INTERNAL MEDICINE CLINIC | Facility: CLINIC | Age: 45
End: 2025-05-12
Payer: COMMERCIAL

## 2025-05-12 VITALS
HEART RATE: 72 BPM | RESPIRATION RATE: 18 BRPM | SYSTOLIC BLOOD PRESSURE: 118 MMHG | BODY MASS INDEX: 26.67 KG/M2 | WEIGHT: 176 LBS | TEMPERATURE: 97 F | DIASTOLIC BLOOD PRESSURE: 72 MMHG | OXYGEN SATURATION: 99 % | HEIGHT: 68 IN

## 2025-05-12 DIAGNOSIS — K64.8 HEMORRHOID PROLAPSE: Primary | ICD-10-CM

## 2025-05-12 PROCEDURE — 99214 OFFICE O/P EST MOD 30 MIN: CPT

## 2025-05-12 RX ORDER — HYDROCORTISONE ACETATE 25 MG/1
25 SUPPOSITORY RECTAL 2 TIMES DAILY
Qty: 14 SUPPOSITORY | Refills: 0 | Status: SHIPPED | OUTPATIENT
Start: 2025-05-12 | End: 2025-05-19

## 2025-05-12 RX ORDER — HYDROCORTISONE ACETATE 25 MG/1
25 SUPPOSITORY RECTAL 2 TIMES DAILY
Qty: 14 SUPPOSITORY | Refills: 0 | Status: SHIPPED | OUTPATIENT
Start: 2025-05-12 | End: 2025-05-12

## 2025-05-12 NOTE — TELEPHONE ENCOUNTER
Dr. Tabares calling to get this patient a sooner appointment for hemorrhoid prolapse, significant paint.     Please advise  Best callback number is patient [phone]

## 2025-05-12 NOTE — TELEPHONE ENCOUNTER
Spoke with patient, and scheduled w/PBP 5/15/25.    Future Appointments   Date Time Provider Department Center   5/13/2025  3:30 PM Johana Buchanan APRN [id number]   5/15/2025  9:45 AM Tomás Schaefer MD [id number]   6/16/2025 11:00 AM Sheri Dillon MD [facility]   7/15/2025  4:40 PM Daily Urena MD [facility]   10/7/2025 12:20 PM Sheri Dillon MD [facility]

## 2025-05-12 NOTE — TELEPHONE ENCOUNTER
Action Requested: Summary for Provider     []  Critical Lab, Recommendations Needed  [] Need Additional Advice  []   FYI    []   Need Orders  [] Need Medications Sent to Pharmacy  []  Other     SUMMARY: appointment today     Reason for call: Anal Problem  Onset: Thursday    Patient called stating on Thursday she noted she has 2 external hemorrhoids and they are painful    Denies injury to the are.   Offered appointment today with NP accepted           Reason for Disposition   MODERATE-SEVERE rectal pain (i.e., interferes with school, work, or sleep)    Protocols used: Rectal Symptoms-A-OH    Future Appointments   Date Time Provider Department Center   5/12/2025  1:30 PM Donna Tabares APRN [facility]   5/13/2025  3:30 PM Johana Buchanan APRN [id number]   6/16/2025 11:00 AM Sheri Dillon MD [facility]   7/15/2025  4:40 PM Daily Urena MD [facility]   10/7/2025 12:20 PM Sheri Dillon MD [facility]

## 2025-05-12 NOTE — PROGRESS NOTES
Lisa Martin is a 44 year old female.   Chief Complaint   Patient presents with    Hemorrhoids     EJ Rm 14- Pt is here for hemorrhoids, pt noticed them on Thursday.  Pt stated they becoming more and more uncomfortable      HPI:      History of Present Illness  Lisa Martin is a 44 year old female with a history of hemorrhoids who presents with severe rectal pain.    She began experiencing rectal pain on Thursday, initially attributing it to hemorrhoids, a condition she has experienced in the past, particularly during her pregnancies. However, this episode feels different and more severe.    By Friday night, the pain had intensified significantly, making basic tasks impossible and not improving over the weekend. She had a particularly difficult time at work on Saturday due to the pain, which she rates as 2 out of 10 when sitting and 7 out of 10 when touching or wiping the area.    No fever, body aches, chills, or blood in stool. She has not noticed any significant changes in the size of the affected area since the onset of symptoms.    She has a history of constipation, which she attributes to her migraine medication, Qulipta. She takes Colace daily to manage this but had run out recently, which may have contributed to her current condition. She also uses Miralax occasionally.    She has not experienced any significant bowel movements that could have triggered the current episode. 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     Allergies:  Allergies[1]   Current Meds:  Current Medications[2]     PMH:   Past Medical History[3]    ROS:   Review of Systems   Constitutional:  Positive for activity change. Negative for appetite change, chills, fatigue and fever.   Gastrointestinal:  Positive for constipation and rectal pain. Negative for nausea and vomiting.   Genitourinary:  Negative for difficulty urinating.   Musculoskeletal:  Negative.         PHYSICAL EXAM:    /72   Pulse 72   Temp 97.2 °F (36.2 °C) (Temporal)   Resp 18   Ht 5' 8\" (1.727 m)   Wt 176 lb (79.8 kg)   LMP  (LMP Unknown)   SpO2 99%   BMI 26.76 kg/m²   Physical Exam  Constitutional:       Appearance: Normal appearance.   Genitourinary:     Rectum: External hemorrhoid present.   Neurological:      Mental Status: She is alert.        ASSESSMENT/ PLAN:     Assessment & Plan  hemorrhoid prolapse  - Prescribed rectal suppository to decrease inflammation.  - Instruct to avoid straining and increase Colace to twice daily.  - Advise use of wet wipes to minimize irritation.  -discussed need for general surgery consult. If unable to get soon visit continue with GI as scheduled. Call with questions or changes in symptoms.     Constipation  Chronic constipation likely exacerbated by Qulipta. Recent lapse in Colace use contributed to constipation.  - Increase Colace to twice daily.  - Encourage increased water intake.  - Advise against straining during bowel movements.       Health Maintenance Due   Topic Date Due    COVID-19 Vaccine (5 - 2024-25 season) 09/01/2024    Annual Physical  04/26/2025    Mammogram  06/11/2025    Pap Smear  09/22/2025            The following individual(s) verbally consented to be recorded using ambient AI listening technology and understand that they can each withdraw their consent to this listening technology at any point by asking the clinician to turn off or pause the recording:    Patient name: Lisa Martin    Pt indicates understanding and agrees to the plan.     No follow-ups on file.    KYLEE Rob          [1] No Known Allergies  [2]   Current Outpatient Medications   Medication Sig Dispense Refill    buPROPion  MG Oral Tablet 24 Hr Take 1 tablet (150 mg total) by mouth daily. 30 tablet 2    naltrexone 50 MG Oral Tab Take 0.5 tablets (25 mg total) by mouth daily. 30 tablet 1    Phentermine HCl 15 MG Oral Cap Take 1  capsule (15 mg total) by mouth every morning. 30 capsule 2    Atogepant (QULIPTA) 60 MG Oral Tab Take 1 tablet by mouth daily. 90 tablet 1    methocarbamol 750 MG Oral Tab Take 1 tablet (750 mg total) by mouth nightly as needed. 30 tablet 0    CARVEDILOL 3.125 MG Oral Tab TAKE 1 TABLET BY MOUTH TWICE A DAY WITH MEALS 180 tablet 2    ALPRAZolam 0.25 MG Oral Tab Take 1 tablet (0.25 mg total) by mouth nightly as needed for Anxiety. 10 tablet 0    ondansetron 4 MG Oral Tablet Dispersible Take 1 tablet (4 mg total) by mouth every 8 (eight) hours as needed for Nausea.      Eletriptan Hydrobromide 40 MG Oral Tab TAKE 1 TABLET BY MOUTH AT ONSET AND MAY REPEAT IF NEEDED AFTER 2 HOURS FOR A MAX OF 2 PILLS IN 24 HOURS 10 tablet 3    Docusate Sodium (COLACE OR) Take by mouth.      levonorgestrel 20 MCG/24HR Intrauterine IUD 20 mcg (1 each total) by Intrauterine route once.     [3]   Past Medical History:   Anxiety    Zoloft daily    Arrhythmia    chronic pvc    Depression    Migraines    PVCs (premature ventricular contractions)    Chronic; Pt currently taking Toprol XL daily    Rotator cuff tear    Left shoulder

## 2025-05-13 ENCOUNTER — OFFICE VISIT (OUTPATIENT)
Facility: CLINIC | Age: 45
End: 2025-05-13
Payer: COMMERCIAL

## 2025-05-13 VITALS
SYSTOLIC BLOOD PRESSURE: 114 MMHG | BODY MASS INDEX: 26.37 KG/M2 | WEIGHT: 174 LBS | HEIGHT: 68 IN | DIASTOLIC BLOOD PRESSURE: 78 MMHG | HEART RATE: 71 BPM

## 2025-05-13 DIAGNOSIS — K62.89 ANAL PAIN: ICD-10-CM

## 2025-05-13 DIAGNOSIS — K64.9 HEMORRHOIDS, UNSPECIFIED HEMORRHOID TYPE: Primary | ICD-10-CM

## 2025-05-13 PROCEDURE — 99204 OFFICE O/P NEW MOD 45 MIN: CPT

## 2025-05-13 RX ORDER — HYDROCORTISONE ACETATE 25 MG/1
25 SUPPOSITORY RECTAL 2 TIMES DAILY
Qty: 14 SUPPOSITORY | Refills: 0 | Status: SHIPPED | OUTPATIENT
Start: 2025-05-20 | End: 2025-05-27

## 2025-05-13 NOTE — H&P
[facility] - Gastroenterology                                                                                                               Reason for consult: hemorrhoids     Requesting physician or provider: aDily Urena MD    Chief Complaint   Patient presents with    Rectal Pain       HPI:   Lisa Martin is a 44 year old year-old female with active diagnoses including hypertension, dyslipidemia, migraines, anxiety. Prior medical/surgical history in note table.    she is here today for evaluation  #constipation  #hemorrhoids  -pt reports painful hemorrhoids since Thursday, 5/8, pain severe Saturday. Used multiple over the counter treatments & sitz bath  -hx of constipation which she related to side effects from qulipta, takes colace daily, miralax prn  -baseline bowel habits twice weekly, typically firm stool bristol type 2-3  -prescribed hydrocortisone suppository yesterday by another provider, has had 2 doses and feeling improvement today    Patient denies symptoms of nausea, vomiting, dyspepsia, dysphagia, odynophagia, globus sensation, heartburn, hematemesis, abdominal pain, change in bowel habits, constipation, diarrhea, hematochezia, or melena. she denies recent change in appetite, fever or unintentional weight loss.      Last colonoscopy: none   Last EGD: 2003    NSAIDS: rare  Tobacco: former  Alcohol: social use  Marijuana: none   Illicit drugs: none     FH GI malignancy: maternal grandpa - rectal cancer   FH IBD: none     No history of adverse reaction to sedation  No LUIS  No anticoagulants/antiplatelet  No pacemaker/defibrillator    Wt Readings from Last 6 Encounters:   05/13/25 174 lb (78.9 kg)   05/12/25 176 lb (79.8 kg)   04/29/25 179 lb (81.2 kg)   03/20/25 170 lb (77.1 kg)   03/11/25 165 lb (74.8 kg)   11/18/24 165 lb (74.8 kg)        History, Medications, Allergies, ROS:      Past Medical  History[1]   Past Surgical History[2]   Family Hx: Family History[3]   Social History: Short Social Hx on File[4]     Medications (Active prior to today's visit):  Current Medications[5]    Allergies:  Allergies[6]    ROS:   CONSTITUTIONAL: negative for fevers, chills, sweats  EYES Negative for scleral icterus or redness, and diplopia  HEENT: Negative for hoarseness  RESPIRATORY: Negative for cough and severe shortness of breath  CARDIOVASCULAR: Negative for crushing sub-sternal chest pain  GASTROINTESTINAL: See HPI  GENITOURINARY: Negative for dysuria  MUSCULOSKELETAL: Negative for arthralgias and myalgias  SKIN: Negative for jaundice, rash or pruritus  NEUROLOGICAL: Negative for dizziness and headaches  BEHAVIOR/PSYCH: Negative for psychotic behavior    PHYSICAL EXAM:   Blood pressure 114/78, pulse 71, height 5' 8\" (1.727 m), weight 174 lb (78.9 kg), not currently breastfeeding.    GEN: Alert, no acute distress, well-nourished   HEENT: anicteric sclera, neck supple, trachea midline, MMM  CV: the extremities are warm and well perfused   LUNGS: No increased work of breathing  ABDOMEN: Soft, symmetrical, non-tender without distention or guarding.   RECTAL: +2 edematous external hemorrhoids without bleeding, erythema. Deferred internal exam  Patient declined offer for chaperone during sensitive exam.   MSK: No erythema, no warmth, no swelling of joints  SKIN: No jaundice, no erythema, no rashes, no lesions  HEMATOLOGIC: No bleeding, no bruising  NEURO: Alert and interactive, BLAIR  PSYCH: appropriate mood & affect    Labs/Imaging/Procedures:     Patient's pertinent labs and imaging were reviewed and discussed with patient today.        .  ASSESSMENT/PLAN:   Lisa Martin is a 44 year old year-old female with active diagnoses including hypertension, dyslipidemia, migraines, anxiety. Prior medical/surgical history in note table.    she is here today for evaluation  #constipation  #hemorrhoids  -pt reports painful  hemorrhoids since Thursday, , pain severe Saturday. Used multiple over the counter treatments & sitz bath  -hx of constipation which she related to side effects from qulipta, takes colace daily, miralax prn  -baseline bowel habits twice weekly, typically firm stool bristol type 2-3  -prescribed hydrocortisone suppository yesterday by another provider, has had 2 doses and feeling improvement today  +2 edematous external hemorrhoids     Discussed increasing medications for constipation regimen.  Continue sitz bath and hydrocortisone suppositories.  Follow-up in 3 months.    Recommendations:    -hydrocortisone suppository 2x daily for 1-2 weeks    -sitz bath 2x daily    -continue colace twice daily     -take miralax 2x daily     -use glycerin suppository to soften    -follow up in 3 months      Orders This Visit:  No orders of the defined types were placed in this encounter.      Meds This Visit:  Requested Prescriptions     Signed Prescriptions Disp Refills    hydrocortisone 25 MG Rectal Suppos 14 suppository 0     Sig: Place 1 suppository (25 mg total) rectally 2 (two) times daily for 7 days.       Imaging & Referrals:  None      KYLEE Torres    [facility]  2025        This note was partially prepared using Dragon Medical voice recognition dictation software. As a result, errors may occur. When identified, these errors have been corrected. While every attempt is made to correct errors during dictation, discrepancies may still exist.          [1]   Past Medical History:   Anxiety    Zoloft daily    Arrhythmia    chronic pvc    Depression    Migraines    PVCs (premature ventricular contractions)    Chronic; Pt currently taking Toprol XL daily    Rotator cuff tear    Left shoulder   [2]   Past Surgical History:  Procedure Laterality Date    Anesth, section  2017    Oral surgery procedure      [place] teeth extraction - no complications    Upper gi endoscopy,exam       EGD   [3]   Family History  Problem Relation Age of Onset    Hypertension Mother     Thyroid Disorder Mother     Other (Other) Mother     Bipolar Disorder Father     Depression Father     Diabetes Father     Alcohol and Other Disorders Associated Father     Hypertension Father     Other (Alcoholism) Father     Other (Elevated C-reaction Protein) Sister     Other (Endometriosis) Sister     Other (Rectal Prolapse) Sister     Diabetes Maternal Grandmother     Stroke Maternal Grandmother     Arthritis Maternal Grandfather     Other (rectal cancer) Maternal Grandfather     Arthritis Paternal Grandmother     Lung Disorder Paternal Grandfather     Other (Pulmonary Fibrosis) Paternal Grandfather    [4]   Social History  Socioeconomic History    Marital status:    Tobacco Use    Smoking status: Former     Current packs/day: 0.00     Types: Cigarettes     Start date: 2003     Quit date: 2013     Years since quittin.0    Smokeless tobacco: Never    Tobacco comments:     Updated 24   Vaping Use    Vaping status: Never Used   Substance and Sexual Activity    Alcohol use: Yes     Alcohol/week: 1.0 standard drink of alcohol     Comment: Socially    Drug use: Never    Sexual activity: Yes   Other Topics Concern    Caffeine Concern Yes    Stress Concern No    Weight Concern No    Special Diet No    Exercise Yes    Seat Belt Yes     Social Drivers of Health     Food Insecurity: No Food Insecurity (2025)    NCSS - Food Insecurity     Worried About Running Out of Food in the Last Year: No     Ran Out of Food in the Last Year: No   Transportation Needs: No Transportation Needs (2025)    NCSS - Transportation     Lack of Transportation: No   Housing Stability: Not At Risk (2025)    NCSS - Housing/Utilities     Has Housing: Yes     Worried About Losing Housing: No     Unable to Get Utilities: No   [5]   Current Outpatient Medications   Medication Sig Dispense Refill    [START ON 2025]  hydrocortisone 25 MG Rectal Suppos Place 1 suppository (25 mg total) rectally 2 (two) times daily for 7 days. 14 suppository 0    hydrocortisone (ANUSOL-HC) 25 MG Rectal Suppos Place 1 suppository (25 mg total) rectally 2 (two) times daily for 7 days. 14 suppository 0    buPROPion  MG Oral Tablet 24 Hr Take 1 tablet (150 mg total) by mouth daily. 30 tablet 2    naltrexone 50 MG Oral Tab Take 0.5 tablets (25 mg total) by mouth daily. 30 tablet 1    Phentermine HCl 15 MG Oral Cap Take 1 capsule (15 mg total) by mouth every morning. 30 capsule 2    Atogepant (QULIPTA) 60 MG Oral Tab Take 1 tablet by mouth daily. 90 tablet 1    methocarbamol 750 MG Oral Tab Take 1 tablet (750 mg total) by mouth nightly as needed. 30 tablet 0    CARVEDILOL 3.125 MG Oral Tab TAKE 1 TABLET BY MOUTH TWICE A DAY WITH MEALS 180 tablet 2    ALPRAZolam 0.25 MG Oral Tab Take 1 tablet (0.25 mg total) by mouth nightly as needed for Anxiety. 10 tablet 0    ondansetron 4 MG Oral Tablet Dispersible Take 1 tablet (4 mg total) by mouth every 8 (eight) hours as needed for Nausea.      Eletriptan Hydrobromide 40 MG Oral Tab TAKE 1 TABLET BY MOUTH AT ONSET AND MAY REPEAT IF NEEDED AFTER 2 HOURS FOR A MAX OF 2 PILLS IN 24 HOURS 10 tablet 3    Docusate Sodium (COLACE OR) Take by mouth.      levonorgestrel 20 MCG/24HR Intrauterine IUD 20 mcg (1 each total) by Intrauterine route once.     [6] No Known Allergies

## 2025-05-22 DIAGNOSIS — F41.9 ANXIETY: ICD-10-CM

## 2025-05-23 NOTE — TELEPHONE ENCOUNTER
Called patient left VM to return call  [facility] also sent

## 2025-05-23 NOTE — TELEPHONE ENCOUNTER
Please call and clarify with pt.  Appears from Dr. Urena's last note that pt does take Sertraline daily.  Once verified we will refill.

## 2025-05-27 ENCOUNTER — PATIENT MESSAGE (OUTPATIENT)
Dept: NEUROLOGY | Facility: CLINIC | Age: 45
End: 2025-05-27

## 2025-05-27 ENCOUNTER — PATIENT MESSAGE (OUTPATIENT)
Facility: CLINIC | Age: 45
End: 2025-05-27

## 2025-05-27 RX ORDER — SERTRALINE HYDROCHLORIDE 100 MG/1
100 TABLET, FILM COATED ORAL DAILY
Qty: 90 TABLET | Refills: 3 | OUTPATIENT
Start: 2025-05-27

## 2025-05-27 NOTE — TELEPHONE ENCOUNTER
Patient replied to Bizible message stating she is no longer taking Sertraline. Refill request denied.     Future Appointments   Date Time Provider Department Center   6/16/2025 11:00 AM Sheri Dillon MD [facility]   7/15/2025  4:40 PM Daily Urena MD [facility] [address] [facility]   10/7/2025 12:20 PM Sheri Dillon MD [facility]

## 2025-05-28 DIAGNOSIS — G43.009 MIGRAINE WITHOUT AURA AND WITHOUT STATUS MIGRAINOSUS, NOT INTRACTABLE: Primary | ICD-10-CM

## 2025-05-28 RX ORDER — ELETRIPTAN HYDROBROMIDE 40 MG/1
TABLET, FILM COATED ORAL
Qty: 10 TABLET | Refills: 2 | Status: SHIPPED | OUTPATIENT
Start: 2025-05-28

## 2025-05-28 RX ORDER — METHYLPREDNISOLONE 4 MG/1
TABLET ORAL
Qty: 21 EACH | Refills: 0 | Status: SHIPPED | OUTPATIENT
Start: 2025-05-28

## 2025-05-28 NOTE — TELEPHONE ENCOUNTER
Medication: ELETRIPTAN HYDROBROMIDE 40 MG Oral Tab      Date of last refill: 02/29/2024 (#10/3)  [place]     Last office visit: 04/08/2025  Due back to clinic per last office note:  not stated  Date next office visit scheduled:    Future Appointments   Date Time Provider Department Center   6/6/2025 11:20 AM Fermin Aviles DO [facility] [address]   6/16/2025 11:00 AM Sheri Dillon MD [facility] [address]   7/15/2025  4:40 PM Daily Urena MD [facility] [address] [facility]   10/7/2025 12:20 PM Sheri Dillon MD [facility] [address]           Last OV note recommendation:    Assessment:  This is a 43 y/o female with a h/o migraines. She started Qulipta after our last visit and this is working very well for her. Continue Qulipta and relpax for breakthrough.         Plan:  1. Migraine  - Continue Qulipta 60mg daily  - Continue relpax for breakthrough     Time: 10 min.        Please note that the following visit was completed using two-way, real-time interactive audio and video communication.  This has been done in good brody to provide continuity of care in the best interest of the provider-patient relationship, due to the ongoing public health crisis/national emergency and because of restrictions of visitation.  There are limitations of this visit as no physical exam could be performed.  Every conscious effort was taken to allow for sufficient and adequate time.  This billing was spent on reviewing labs, medications, radiology tests and decision making.  Appropriate medical decision-making and tests are ordered as detailed in the plan of care above.”        Shayne Roland DO  Neurology

## 2025-06-02 DIAGNOSIS — G43.109 MIGRAINE WITH AURA AND WITHOUT STATUS MIGRAINOSUS, NOT INTRACTABLE: ICD-10-CM

## 2025-06-02 RX ORDER — ATOGEPANT 60 MG/1
1 TABLET ORAL DAILY
Qty: 90 TABLET | Refills: 0 | Status: SHIPPED | OUTPATIENT
Start: 2025-06-02

## 2025-06-02 NOTE — TELEPHONE ENCOUNTER
Medication: Qulipta 60 mg     Date of last refill: 02/25/2025 (#90/1)  [place]05/2025     Last office visit: 04/08/2025  Due back to clinic per last office note:  not indicated  Date next office visit scheduled:    Future Appointments   Date Time Provider Department Center   6/6/2025 11:20 AM Fermin Aviles DO [facility] [address]   6/16/2025 11:00 AM Sheri Dillon MD [facility] [address]   7/15/2025  4:40 PM Daily Urena MD [facility] [address] [facility]   10/7/2025 12:20 PM Sheri Dillon MD [facility]           Last OV note recommendation:    Assessment:  This is a 45 y/o female with a h/o migraines. She started Qulipta after our last visit and this is working very well for her. Continue Qulipta and relpax for breakthrough.         Plan:  1. Migraine  - Continue Qulipta 60mg daily  - Continue relpax for breakthrough

## 2025-06-06 ENCOUNTER — OFFICE VISIT (OUTPATIENT)
Facility: CLINIC | Age: 45
End: 2025-06-06
Payer: COMMERCIAL

## 2025-06-06 VITALS — WEIGHT: 174 LBS | BODY MASS INDEX: 26.37 KG/M2 | HEIGHT: 68 IN

## 2025-06-06 DIAGNOSIS — M24.852 OTH SPECIFIC JOINT DERANGEMENTS OF LEFT HIP, NEC: Primary | ICD-10-CM

## 2025-06-06 PROCEDURE — 99214 OFFICE O/P EST MOD 30 MIN: CPT | Performed by: FAMILY MEDICINE

## 2025-06-06 NOTE — PROGRESS NOTES
Sports Medicine Clinic Note    Subjective:    Chief Complaint: Bilateral hip pain    Date of Injury: Right hip since June 2019; Left hip since August 2024    History: 44-year-old female returns for follow-up regarding persistent bilateral hip pain. Right hip pain remains the primary issue, though similar discomfort has developed in the left hip. She reports that the corticosteroid injection in the right hip performed approximately three months ago provided only brief relief. The pain continues to interfere with daily activities such as walking, standing, and even low-impact movements. Despite a prior trial of physical therapy and various anti-inflammatories, symptom relief has been limited. She has not tried Celebrex yet. Surgical options have been discussed, but she expresses hesitation due to her demanding schedule and concern over recovery. She remains open to non-operative options but is also considering further diagnostic workup for the left hip.    Objective:    Bilateral Hip Examination:    Inspection: No erythema, swelling, or gross deformities. Symmetrical gait without significant deviation.  Palpation: Tenderness over the greater trochanter bilaterally, more pronounced on the right.  Range of Motion: Right hip: Pain elicited with flexion beyond 90 degrees, internal rotation, and adduction. Left hip: Mild discomfort at terminal flexion and internal rotation.  Neurovascular: Sensation intact bilaterally to light touch in femoral and sciatic distributions. Distal pulses palpable and symmetric.  Special Tests: Positive FADIR bilaterally, more marked on the right. Negative CHEMO bilaterally.    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 Nondisplaced acetabular labral tear in the anterosuperior and posterosuperior quadrants. Small joint effusion. Mild gluteus minimus tendinopathy.    Previous X-ray (Bilateral Hips): No fractures,  arthropathy, or significant abnormalities.    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  Chronic bilateral hip pain, persistent despite physical therapy and prior injections.  Consideration of early impingement changes in the left hip based on evolving symptoms.    Plan:    Additional Workup: Order MRI of the left hip to evaluate for similar CAM morphology or labral pathology.  Therapy: Consider future PT targeting hip mechanics if symptoms persist post-MRI and prior interventions fail.  Medications: NSAIDs for pain control and inflammation.  Bracing/Casting: Not indicated at this time.  Procedures: Consider repeat corticosteroid injection if symptoms persist and imaging confirms intra-articular pathology.  Activity Recommendations: Avoid high-impact activities and prolonged standing. May perform gentle range of motion and low-resistance cycling if tolerated.    Follow-Up: Tentatively scheduled once left hip MRI is completed to review imaging findings and determine next steps, including possible referral for surgical consultation.      Fermin Aviles DO, CAQSM   Primary Care Sports Medicine

## 2025-06-12 ENCOUNTER — EKG ENCOUNTER (OUTPATIENT)
Dept: LAB | Facility: HOSPITAL | Age: 45
End: 2025-06-12
Attending: NURSE PRACTITIONER
Payer: COMMERCIAL

## 2025-06-12 DIAGNOSIS — Z51.81 THERAPEUTIC DRUG MONITORING: ICD-10-CM

## 2025-06-12 DIAGNOSIS — E66.9 OBESITY (BMI 30-39.9): ICD-10-CM

## 2025-06-12 DIAGNOSIS — I10 ESSENTIAL HYPERTENSION: ICD-10-CM

## 2025-06-12 DIAGNOSIS — F41.9 ANXIETY: ICD-10-CM

## 2025-06-12 DIAGNOSIS — E78.5 DYSLIPIDEMIA: ICD-10-CM

## 2025-06-12 DIAGNOSIS — R63.2 BINGE EATING: ICD-10-CM

## 2025-06-12 DIAGNOSIS — I49.3 PVC (PREMATURE VENTRICULAR CONTRACTION): ICD-10-CM

## 2025-06-12 PROCEDURE — 93005 ELECTROCARDIOGRAM TRACING: CPT

## 2025-06-12 PROCEDURE — 93010 ELECTROCARDIOGRAM REPORT: CPT | Performed by: INTERNAL MEDICINE

## 2025-06-13 LAB
ATRIAL RATE: 71 BPM
P AXIS: 52 DEGREES
P-R INTERVAL: 162 MS
Q-T INTERVAL: 382 MS
QRS DURATION: 86 MS
QTC CALCULATION (BEZET): 415 MS
R AXIS: 35 DEGREES
T AXIS: 14 DEGREES
VENTRICULAR RATE: 71 BPM

## 2025-06-16 ENCOUNTER — TELEMEDICINE (OUTPATIENT)
Dept: INTERNAL MEDICINE CLINIC | Facility: CLINIC | Age: 45
End: 2025-06-16
Payer: COMMERCIAL

## 2025-06-16 DIAGNOSIS — F41.9 ANXIETY: ICD-10-CM

## 2025-06-16 DIAGNOSIS — Z51.81 THERAPEUTIC DRUG MONITORING: Primary | ICD-10-CM

## 2025-06-16 DIAGNOSIS — R63.2 BINGE EATING: ICD-10-CM

## 2025-06-16 DIAGNOSIS — E78.5 DYSLIPIDEMIA: ICD-10-CM

## 2025-06-16 DIAGNOSIS — I10 ESSENTIAL HYPERTENSION: ICD-10-CM

## 2025-06-16 DIAGNOSIS — I49.3 PVC (PREMATURE VENTRICULAR CONTRACTION): ICD-10-CM

## 2025-06-16 DIAGNOSIS — E66.9 OBESITY (BMI 30-39.9): ICD-10-CM

## 2025-06-16 RX ORDER — NALTREXONE HYDROCHLORIDE 50 MG/1
25 TABLET, FILM COATED ORAL DAILY
Qty: 30 TABLET | Refills: 1 | Status: SHIPPED | OUTPATIENT
Start: 2025-06-16

## 2025-06-16 RX ORDER — BUPROPION HYDROCHLORIDE 300 MG/1
300 TABLET ORAL DAILY
Qty: 30 TABLET | Refills: 2 | Status: SHIPPED | OUTPATIENT
Start: 2025-06-16

## 2025-06-16 RX ORDER — PHENTERMINE HYDROCHLORIDE 37.5 MG/1
37.5 TABLET ORAL
Qty: 30 TABLET | Refills: 2 | Status: SHIPPED | OUTPATIENT
Start: 2025-06-16

## 2025-06-16 NOTE — PROGRESS NOTES
HISTORY OF PRESENT ILLNESS  Chief Complaint   Patient presents with    Weight Check     video       Lisa Martin is a 44 year old female here for follow up in medical weight loss program.   [facility] Follow Up    General Information  Nutrition Recall  Exercise     Sleep              History of Present Illness  Lisa Martin is a 44 year old female who presents with concerns about weight loss management.    She has been struggling with weight loss despite various efforts. She was previously prescribed phentermine but did not experience significant appetite suppression or weight loss. She has a history of arrhythmia, which initially made her hesitant to try phentermine, but she has not experienced any arrhythmia-related issues while on the medication. An EKG was performed after starting phentermine.    Prior to this, she had not experienced any weight loss. She has tried Wegovy in the past but did not notice any significant effects and experienced some sickness with it.    She is currently taking bupropion (Wellbutrin) after being switched from Zoloft due to concerns about hair loss. She reports increased irritability and 'rage' since the switch, which she attributes to the medication change. She is considering adjusting her bupropion dosage or potentially returning to Zoloft to manage these symptoms.    She is also taking naltrexone for cravings, using half a tablet as prescribed.    Wt Readings from Last 6 Encounters:   06/06/25 174 lb (78.9 kg)   05/13/25 174 lb (78.9 kg)   05/12/25 176 lb (79.8 kg)   04/29/25 179 lb (81.2 kg)   03/20/25 170 lb (77.1 kg)   03/11/25 165 lb (74.8 kg)              Breakfast Lunch Dinner Snacks Fluids   Reviewed           REVIEW OF SYSTEMS  GENERAL HEALTH: feels well otherwise, denied any fevers chills or night sweats   RESPIRATORY: denies shortness of breath   CARDIOVASCULAR: denies chest pain  GI: denies abdominal pain    EXAM  LMP  (LMP Unknown)   GENERAL: well developed,  well nourished,in no apparent distress, A/O x3  SKIN: no rashes,no suspicious lesions  HEENT: atraumatic, normocephalic, OP-clear, PERRL  NECK: supple,no adenopathy  LUNGS: clear to auscultation bilaterally   CARDIO: RRR without murmur  GI: good BS's,NT/ND, no masses or HSM  EXTREMITIES: no cyanosis, no clubbing, no edema    Lab Results   Component Value Date    WBC 6.8 04/23/2024    RBC 4.28 04/23/2024    HGB 14.1 04/23/2024    HCT 39.3 04/23/2024    MCV 91.8 04/23/2024    MCH 32.9 04/23/2024    MCHC 35.9 04/23/2024    RDW 12.4 04/23/2024    .0 04/23/2024     Lab Results   Component Value Date    GLU 96 04/23/2024    BUN 10 04/23/2024    BUNCREA 17.9 07/13/2021    CREATSERUM 0.72 04/23/2024    ANIONGAP 4 04/23/2024    GFRNAA 112 07/13/2021    GFRAA 129 07/13/2021    CA 8.9 04/23/2024    OSMOCALC 289 04/23/2024    ALKPHO 69 04/23/2024    AST 7 (L) 04/23/2024    ALT 19 04/23/2024    BILT 1.3 04/23/2024    TP 7.2 04/23/2024    ALB 3.9 04/23/2024    GLOBULIN 3.3 04/23/2024    AGRATIO 1.8 06/26/2012     04/23/2024    K 4.3 04/23/2024     04/23/2024    CO2 27.0 04/23/2024     No results found for: \"EAG\", \"A1C\"  Lab Results   Component Value Date    CHOLEST 221 (H) 04/23/2024    TRIG 80 04/23/2024    HDL 65 (H) 04/23/2024     (H) 04/23/2024    VLDL 15 04/23/2024    TCHDLRATIO 2.8 03/16/2011    NONHDLC 156 (H) 04/23/2024     Lab Results   Component Value Date    T4F 0.9 07/13/2021    TSH 1.020 04/23/2024     Lab Results   Component Value Date    B12 565 07/13/2021     Lab Results   Component Value Date    VITD 38.3 07/13/2021       Medications Ordered Prior to Encounter[1]    ASSESSMENT  Analyzed weight data:     [facility] Information  Patient type: Lifestyle   Date of Initial Weight: 6/3/2019 Initial Weight: 207      5% Goal: 10.35 10% Goal: 20.7       Have any comorbidities improved since the last visit?   Hypertension DM 2 Dyslipidemia Osteoarthritis Sleep Apnea                    Diagnoses and  all orders for this visit:    Therapeutic drug monitoring  -     Phentermine HCl 37.5 MG Oral Tab; Take 1 tablet (37.5 mg total) by mouth every morning before breakfast.  -     naltrexone 50 MG Oral Tab; Take 0.5 tablets (25 mg total) by mouth daily.    Obesity (BMI 30-39.9)  -     Phentermine HCl 37.5 MG Oral Tab; Take 1 tablet (37.5 mg total) by mouth every morning before breakfast.  -     naltrexone 50 MG Oral Tab; Take 0.5 tablets (25 mg total) by mouth daily.    PVC (premature ventricular contraction)  -     Phentermine HCl 37.5 MG Oral Tab; Take 1 tablet (37.5 mg total) by mouth every morning before breakfast.  -     naltrexone 50 MG Oral Tab; Take 0.5 tablets (25 mg total) by mouth daily.    Essential hypertension  -     Phentermine HCl 37.5 MG Oral Tab; Take 1 tablet (37.5 mg total) by mouth every morning before breakfast.  -     naltrexone 50 MG Oral Tab; Take 0.5 tablets (25 mg total) by mouth daily.    Dyslipidemia  -     Phentermine HCl 37.5 MG Oral Tab; Take 1 tablet (37.5 mg total) by mouth every morning before breakfast.  -     naltrexone 50 MG Oral Tab; Take 0.5 tablets (25 mg total) by mouth daily.    Binge eating  -     Phentermine HCl 37.5 MG Oral Tab; Take 1 tablet (37.5 mg total) by mouth every morning before breakfast.  -     naltrexone 50 MG Oral Tab; Take 0.5 tablets (25 mg total) by mouth daily.    Anxiety  -     Phentermine HCl 37.5 MG Oral Tab; Take 1 tablet (37.5 mg total) by mouth every morning before breakfast.  -     naltrexone 50 MG Oral Tab; Take 0.5 tablets (25 mg total) by mouth daily.    Other orders  -     buPROPion  MG Oral Tablet 24 Hr; Take 1 tablet (300 mg total) by mouth daily.        PLAN  Assessment & Plan  Obesity  Total time spent on chart review, pre-charting, obtaining history, counseling, and educating, reviewing labs was 30 minutes.    Phentermine 37.5 mg showed no appetite suppression or weight loss. EKG normal. Discussed cost-effective injectable options.  Interested in Zepbound vial program due to cost.  - Continue phentermine 37.5 mg for one month, monitor for arrhythmia symptoms.  - Discuss Zepbound vial program as backup for injectable medication.  - Consider Zepbound 5 mg if phentermine ineffective.  - Contact provider for Zepbound prescription assistance.    Arrhythmia  Symptomatic PVCs present. Concern about phentermine exacerbating arrhythmia. Phentermine trial at 37.5 mg for one month   - Monitor for arrhythmia symptoms on phentermine 37.5 mg.  - Report any exacerbation of PVCs or new symptoms immediately.    Anxiety  Irritability and rage likely anxiety-related. Wellbutrin initiated to replace Zoloft due to suspected hair loss. No depression symptoms present. Wellbutrin may exacerbate anxiety, but dose increased to 300 mg short-term for irritability.  - Increase Wellbutrin to 300 mg, monitor symptoms for 2-3 weeks.  - If symptoms persist, consider returning to Zoloft.  - Switch pharmacy to [facility] for medication dispensing.    There are no Patient Instructions on file for this visit.    No follow-ups on file.    Patient verbalizes understanding.    Sheri Dillon MD           [1]   Current Outpatient Medications on File Prior to Visit   Medication Sig Dispense Refill    Atogepant (QULIPTA) 60 MG Oral Tab Take 1 tablet by mouth daily. 90 tablet 0    methylPREDNISolone (MEDROL) 4 MG Oral Tablet Therapy Pack Take as directed 21 each 0    Eletriptan Hydrobromide 40 MG Oral Tab TAKE 1 TABLET BY MOUTH EVERY DAY REPEAT IF NEEDED AFTER 2 HOURS FOR A MAX OF 2 PILLS IN 24 HRS 10 tablet 2    methocarbamol 750 MG Oral Tab Take 1 tablet (750 mg total) by mouth nightly as needed. 30 tablet 0    CARVEDILOL 3.125 MG Oral Tab TAKE 1 TABLET BY MOUTH TWICE A DAY WITH MEALS 180 tablet 2    ALPRAZolam 0.25 MG Oral Tab Take 1 tablet (0.25 mg total) by mouth nightly as needed for Anxiety. 10 tablet 0    ondansetron 4 MG Oral Tablet Dispersible Take 1 tablet (4 mg total) by mouth every  8 (eight) hours as needed for Nausea.      Docusate Sodium (COLACE OR) Take by mouth.      levonorgestrel 20 MCG/24HR Intrauterine IUD 20 mcg (1 each total) by Intrauterine route once.       No current facility-administered medications on file prior to visit.

## 2025-07-09 ENCOUNTER — PATIENT MESSAGE (OUTPATIENT)
Dept: NEUROLOGY | Facility: CLINIC | Age: 45
End: 2025-07-09

## 2025-07-09 RX ORDER — METHYLPREDNISOLONE 4 MG/1
TABLET ORAL
Qty: 1 EACH | Refills: 0 | Status: SHIPPED | OUTPATIENT
Start: 2025-07-09

## 2025-07-09 NOTE — TELEPHONE ENCOUNTER
Pt reporting increased number of migraines.  Now occurring every 24-72 hours per Marquee message.    Pt asking for adjunct treatment.    Last medrol dose macey sent 05/28/2025      Medications tried and failed:    Qulipta (current - getting increased # migraines)  Eletriptan (current)  Toprol XL  Sertraline  Topamax

## 2025-07-15 ENCOUNTER — HOSPITAL ENCOUNTER (OUTPATIENT)
Dept: MRI IMAGING | Facility: HOSPITAL | Age: 45
Discharge: HOME | End: 2025-07-15
Attending: FAMILY MEDICINE
Payer: COMMERCIAL

## 2025-07-15 DIAGNOSIS — M24.852 OTH SPECIFIC JOINT DERANGEMENTS OF LEFT HIP, NEC: ICD-10-CM

## 2025-07-15 PROCEDURE — 73721 MRI JNT OF LWR EXTRE W/O DYE: CPT | Performed by: FAMILY MEDICINE

## 2025-07-16 ENCOUNTER — TELEMEDICINE (OUTPATIENT)
Dept: NEUROLOGY | Facility: CLINIC | Age: 45
End: 2025-07-16
Payer: COMMERCIAL

## 2025-07-16 DIAGNOSIS — G43.009 MIGRAINE WITHOUT AURA AND WITHOUT STATUS MIGRAINOSUS, NOT INTRACTABLE: Primary | ICD-10-CM

## 2025-07-16 PROCEDURE — 98006 SYNCH AUDIO-VIDEO EST MOD 30: CPT | Performed by: OTHER

## 2025-07-16 RX ORDER — DIVALPROEX SODIUM 250 MG/1
250 TABLET, FILM COATED, EXTENDED RELEASE ORAL DAILY
Qty: 30 TABLET | Refills: 2 | Status: SHIPPED | OUTPATIENT
Start: 2025-07-16

## 2025-07-16 NOTE — PROGRESS NOTES
Neurology H&P    Lisa Martin Patient Status:  No patient class for patient encounter    1980 MRN [id number]   Location [facility] Attending No att. providers found   Hosp Day # 0 PCP Daily Urena MD     Subjective:  Initial Clinic HPI 22  Lisa Martin is a(n) 44 year old female with a PMH significant for migraines. She has previously seen my colleague Dr. Sorto and Dr. Talavera. She is currently taking Topamax 50mg pm for migraine prophylaxsis. She states that she only gets 2-3 migraines per year. She takes relpax for migraine relief and she reports that this works well to help abort her migraines if she takes it in time. She has only had one migraine in the past 6 months. Her migraines are preceded by a visual aura. Wavy lines in her vision for about 20 minutes prior to the migraine. She is on an estrogen containing BC product as well.     Interim History:   Pt was last seen on 25. Since that time she has been taking Qulipta 60mg daily for her migraines.  She states that she has been having more migraines. She states that in May she started to have more migraines and we gave her an MDP. She states that this seemed to help but that she is now having migraines every 24 to 48 hours.       Current Medications:  Current Outpatient Medications   Medication Sig Dispense Refill    methylPREDNISolone (MEDROL) 4 MG Oral Tablet Therapy Pack Take as directed 1 each 0    Phentermine HCl 37.5 MG Oral Tab Take 1 tablet (37.5 mg total) by mouth every morning before breakfast. 30 tablet 2    buPROPion  MG Oral Tablet 24 Hr Take 1 tablet (300 mg total) by mouth daily. 30 tablet 2    naltrexone 50 MG Oral Tab Take 0.5 tablets (25 mg total) by mouth daily. 30 tablet 1    Atogepant (QULIPTA) 60 MG Oral Tab Take 1 tablet by mouth daily. 90 tablet 0    methylPREDNISolone (MEDROL) 4 MG Oral Tablet Therapy Pack Take as directed 21 each 0    Eletriptan Hydrobromide 40 MG Oral Tab TAKE 1  TABLET BY MOUTH EVERY DAY REPEAT IF NEEDED AFTER 2 HOURS FOR A MAX OF 2 PILLS IN 24 HRS 10 tablet 2    methocarbamol 750 MG Oral Tab Take 1 tablet (750 mg total) by mouth nightly as needed. 30 tablet 0    CARVEDILOL 3.125 MG Oral Tab TAKE 1 TABLET BY MOUTH TWICE A DAY WITH MEALS 180 tablet 2    ALPRAZolam 0.25 MG Oral Tab Take 1 tablet (0.25 mg total) by mouth nightly as needed for Anxiety. 10 tablet 0    ondansetron 4 MG Oral Tablet Dispersible Take 1 tablet (4 mg total) by mouth every 8 (eight) hours as needed for Nausea.      Docusate Sodium (COLACE OR) Take by mouth.      levonorgestrel 20 MCG/24HR Intrauterine IUD 20 mcg (1 each total) by Intrauterine route once.         Problem List:  Patient Active Problem List   Diagnosis    Other abnormal blood chemistry    Migraine    Lumbago    Urinary tract infection, site not specified    Urinary frequency    Palpitations    Screening for thyroid disorder    Screening for lipoid disorders    Iron deficiency anemia, unspecified    Supervision of other normal pregnancy, antepartum (HCC)    Pregnancy (HCC)    Pregnant (HCC)    Anxiety    Memory loss    Arrhythmia    Pain of right hip    Therapeutic drug monitoring    Obesity (BMI 30-39.9)    PVC (premature ventricular contraction)    Dyslipidemia    Essential hypertension    Binge eating       PMHx:  Past Medical History:    Anxiety    Zoloft daily    Arrhythmia    chronic pvc    Depression    Migraines    PVCs (premature ventricular contractions)    Chronic; Pt currently taking Toprol XL daily    Rotator cuff tear    Left shoulder       PSHx:  Past Surgical History:   Procedure Laterality Date    Anesth, section  2017    Oral surgery procedure  2000    [place] teeth extraction - no complications    Upper gi endoscopy,exam      EGD       SocHx:  Social History     Socioeconomic History    Marital status:    Tobacco Use    Smoking status: Former     Current packs/day: 0.00     Types: Cigarettes     Start  date: 2003     Quit date: 2013     Years since quittin.2    Smokeless tobacco: Never    Tobacco comments:     Updated 24   Vaping Use    Vaping status: Never Used   Substance and Sexual Activity    Alcohol use: Yes     Alcohol/week: 1.0 standard drink of alcohol     Comment: Socially    Drug use: Never    Sexual activity: Yes   Other Topics Concern    Caffeine Concern Yes    Stress Concern No    Weight Concern No    Special Diet No    Exercise Yes    Seat Belt Yes     Social Drivers of Health     Food Insecurity: No Food Insecurity (2025)    NCSS - Food Insecurity     Worried About Running Out of Food in the Last Year: No     Ran Out of Food in the Last Year: No   Transportation Needs: No Transportation Needs (2025)    NCSS - Transportation     Lack of Transportation: No   Housing Stability: Not At Risk (2025)    NCSS - Housing/Utilities     Has Housing: Yes     Worried About Losing Housing: No     Unable to Get Utilities: No       Family History:  Family History   Problem Relation Age of Onset    Hypertension Mother     Thyroid Disorder Mother     Other (Other) Mother     Bipolar Disorder Father     Depression Father     Diabetes Father     Alcohol and Other Disorders Associated Father     Hypertension Father     Other (Alcoholism) Father     Other (Elevated C-reaction Protein) Sister     Other (Endometriosis) Sister     Other (Rectal Prolapse) Sister     Diabetes Maternal Grandmother     Stroke Maternal Grandmother     Arthritis Maternal Grandfather     Other (rectal cancer) Maternal Grandfather     Arthritis Paternal Grandmother     Lung Disorder Paternal Grandfather     Other (Pulmonary Fibrosis) Paternal Grandfather             ROS:  10 point ROS completed and was negative, except for pertinent positive and negatives stated in subjective.    Objective/Physical Exam:    Vital Signs:  not currently breastfeeding.    Gen: Awake and in no apparent distress  HEENT: moist mucus  membranes  Neck: Supple  Cardiovascular: Regular rate and rhythm, no murmur  Pulm: CTAB  GI: non-tender, normal bowel sounds  Skin: normal, dry  Extremities: No clubbing or cyanosis      Neurologic:   MENTAL STATUS: alert, ox3, normal attention, language and fund of knowledge.      CRANIAL NERVES II to XII: PERRLA, no ptosis or diplopia, EOM intact, facial sensation intact, strong eye closure, face is symmetric, no dysarthria, tongue midline,  no tongue fasciculations or atrophy, strong shoulder shrug.    MOTOR EXAMINATION: normal tone, no fasciculations, normal strength throughout in UEs and LEs      SENSORY EXAMINATION:  UE: intact to light touch, pinprick intact  LE: intact to light touch, pinprick intact    COORDINATION:  No dysmetria, or intention tremors     REFLEXES: 2+ at biceps, 2+ brachioradialis, 2+ at patella, 2+ at the ankles     GAIT: normal stance, normal gait    Romberg's: negative        Labs:       Imaging:  MRI Brain 7/2012    FINDINGS:     The midline structures of the brain including the        pituitary have normal appearance.             No acute cortical or brainstem infarct.  No hydrocephalus or        midline shift.                      No enhancing brain mass.                            No significant disease in the visualized paranasal sinuses.                                   CONCLUSION:     Normal exam.              Assessment:  This is a 43 y/o female with a h/o migraines. She has been taking Qulipta 60 mg daily for migraine control which was working very well until May of this year.  She states that she is now getting migraine every other day.  She is using more of her Nessa triptan.  I did discuss other treatment options including switching to a monthly injectable CGRP or Botox.  She states that she does not want to give up on Qulipta yet as it has been working so well for her historically.  I did discuss other options including Topamax Depakote and gabapentin.  She is already on  Wellbutrin and carvedilol so we will avoid a TCA or beta-blocker at this time.  She states that she has tried Topamax in the past and had poor reactions to taking this medication.  Will start Depakote 250 mg extended release once daily at this time, in addition to the daily Qulipta 60 mg.      Plan:  1. Migraine  - Continue Qulipta 60mg daily  - Continue relpax for breakthrough  - Start Depakote 250mg ER Qday    Time: 18 min.      Please note that the following visit was completed using two-way, real-time interactive audio and video communication.  This has been done in good brody to provide continuity of care in the best interest of the provider-patient relationship, due to the ongoing public health crisis/national emergency and because of restrictions of visitation.  There are limitations of this visit as no physical exam could be performed.  Every conscious effort was taken to allow for sufficient and adequate time.  This billing was spent on reviewing labs, medications, radiology tests and decision making.  Appropriate medical decision-making and tests are ordered as detailed in the plan of care above.”      Shayne Roland, DO  Neurology

## 2025-08-10 ENCOUNTER — PATIENT MESSAGE (OUTPATIENT)
Dept: NEUROLOGY | Facility: CLINIC | Age: 45
End: 2025-08-10

## 2025-08-10 DIAGNOSIS — G43.009 MIGRAINE WITHOUT AURA AND WITHOUT STATUS MIGRAINOSUS, NOT INTRACTABLE: Primary | ICD-10-CM

## 2025-08-14 RX ORDER — VERAPAMIL HYDROCHLORIDE 80 MG/1
80 TABLET ORAL 3 TIMES DAILY
Qty: 90 TABLET | Refills: 0 | Status: SHIPPED | OUTPATIENT
Start: 2025-08-14

## 2025-08-25 RX ORDER — CARVEDILOL 3.12 MG/1
3.12 TABLET ORAL 2 TIMES DAILY WITH MEALS
Qty: 180 TABLET | Refills: 2 | OUTPATIENT
Start: 2025-08-25

## 2025-08-28 ENCOUNTER — TELEPHONE (OUTPATIENT)
Dept: NEUROLOGY | Facility: CLINIC | Age: 45
End: 2025-08-28

## (undated) DIAGNOSIS — G43.109 MIGRAINE WITH AURA AND WITHOUT STATUS MIGRAINOSUS, NOT INTRACTABLE: ICD-10-CM

## (undated) DIAGNOSIS — Z12.31 ENCOUNTER FOR SCREENING MAMMOGRAM FOR MALIGNANT NEOPLASM OF BREAST: Primary | ICD-10-CM

## (undated) DEVICE — SKIN REG/FINE DUAL MARKER, RULER, LABELS: Brand: MEDLINE

## (undated) DEVICE — GLOVE SUR 6.5 SENSICARE PIP WHT PWD F

## (undated) DEVICE — GLOVE SUR 7.5 SENSICARE PIP WHT PWD F

## (undated) DEVICE — REMOVER LOT 4OZ N IRRIG UNSCNT SFT MOIST LIQ

## (undated) DEVICE — BANDAGE ADH 1INX3IN NAT FAB N ADH PD CURAD

## (undated) DEVICE — PAIN TRAY: Brand: MEDLINE INDUSTRIES, INC.

## (undated) DEVICE — NEEDLE SPNL 22GA L3.5IN BLK QNCKE STYL DISP

## (undated) NOTE — IP AVS SNAPSHOT
[facility] [place] [address] [place], [address] ~ [phone]                Discharge Summary   2/24/2017    [facility] [place]           Admission Information        Provider Department    2/24/2017 Le Alicea MD  1nw-A Activity recommendations:  -Avoid vigorous exercise.  -Do not take any long trips out of the house.  -Avoid constipation. Take a stool softener twice daily. Eat plenty of fiber and drink plenty of water. Use laxatives if needed.     Call the office or Willye Scriver Follow up with Julio Cesar Mohan MD.    Specialty:  [facility]    Why:  keep your next scheduled OB appointment    Contact information:    [address] [place], [place] [id number] [facility]        Future Appointments     Mar 03, 2017  2:00 P Antepartum Education  Resolved   Safety Resolved   Review Plan of Care Resolved   Treatment/Procedures Resolved   Medications Resolved   Psychosocial/Spiritual Support Resolved   Community Resources Resolved   Preeclampsia/Hypertension Resolved         [place]

## (undated) NOTE — IP AVS SNAPSHOT
[facility] [place] [address] [place], [address] ~ [phone]                Discharge Summary   4/12/2017    Lew Amaroitis           Admission Information        Provider Department    4/12/2017 Christina Alston MD  [id number] Commonly known as:  ZOLOFT        TAKE 1 TABLET(50 MG) BY MOUTH DAILY    Fernando Guevara     [    ]    [    ]    [    ]    [    ]            Where to Get Your Medications      Please  your prescriptions at the location directed by your doctor or n 3.53 (L) (04/12/17)  11.7 (L) (04/12/17)  33.0 (L) (04/12/17)  93.5    (04/12/17)  152.0       Recent Hematology Lab Results (cont.)  (Last 3 results in the past 90 days)    Neutrophil % Lymphocyte % Monocyte % Eosinophil % Basophil % Prelim Neut Abs Final Call your doctor if you have excessive bleeding. More than one pad per hour  Call Lactation Department with any questions/concerns [phone]      In the next few days, a nurse may call you to see how you and baby are doing.   In the next few weeks you m

## (undated) NOTE — LETTER
[facility]  [address] [facility], [address] [place] [address]    Consent for Operation    Date: __________________    Time: _______________    1.  I authorize the performance upon Pina Martin the following operation:                              Prim procedure has been videotaped, the surgeon will obtain the original videotape. The hospital will not be responsible for storage or maintenance of this tape.     6. For the purpose of advancing medical education, I consent to the admittance of observers to t STATEMENTS REQUIRING INSERTION OR COMPLETION WERE FILLED IN.     Signature of Patient:   ___________________________    When the patient is a minor or mentally incompetent to give consent:  Signature of person authorized to consent for patient: ____________ these medicines may increase my risk of anesthetic complications. · If I am allergic to anything or have had a reaction to anesthesia before. 3. I understand how the anesthesia medicine will help me (benefits).     4. I understand that with any type of patient’s representative) and answered their questions. The patient or their representative has agreed to have anesthesia services.     _____________________________________________________________________________  Witness        Date   Time  I have chanel

## (undated) NOTE — LETTER
[facility]  [address]  [address] [address]  [address] [place] [address]  Dept: [phone]  Dept Fax: [phone]         November 28, 2019    Patient: Monet Webb [place]   YOB: 1980   Date of Visit: 11/28/2019       To Whom It May Concer

## (undated) NOTE — Clinical Note
IMPRESSION: IUP at 36w3d Polyhydramnios - likely idiopathic Unstable fetal lie: Cephalic at initiation of ultrasound and back up transverse at conclusion of ultrasound  RECOMMENDATIONS: Continue care with Dr. Giacomo Munoz Continue weekly NST's Reassess fetal pos

## (undated) NOTE — LETTER
Date: 12/4/2019    Patient Name: Andree Martin          To Whom it may concern: This letter has been written at the patient's request. The above patient was seen at the [facility] for treatment of a medical condition.     This patien

## (undated) NOTE — MR AVS SNAPSHOT
After Visit Summary   4/3/2017    Mere Bernabe Mario    MRN: [id number]           Visit Information        Provider Department Dept Phone    4/3/2017  8:30 AM [address] [facility] [address] [facility] [id number]   Outpt [phone]      Your Vitals Were     BP Pulse LMP [address], [address], [place]: [phone]  [address] [facility] [address]: [phone]  [address]: [phone]    Important note regarding exams that require a referral/authoriza Tips for increasing your physical activity – Adults who are physically active are less likely to develop some chronic diseases than adults who are inactive.      HOW TO GET STARTED: HOW TO STAY MOTIVATED:   Start activities slowly and build up over time Do

## (undated) NOTE — LETTER
06/05/20        Gail Fabian [facility] [address]  [address] [address]      Dear Deandra Urbano,    [facility] records indicate that you have outstanding FASTING lab work and or testing that was ordered for you and has not yet been completed:  Please have fastin